# Patient Record
Sex: FEMALE | Race: WHITE | NOT HISPANIC OR LATINO | Employment: OTHER | URBAN - METROPOLITAN AREA
[De-identification: names, ages, dates, MRNs, and addresses within clinical notes are randomized per-mention and may not be internally consistent; named-entity substitution may affect disease eponyms.]

---

## 2017-02-06 ENCOUNTER — HOSPITAL ENCOUNTER (INPATIENT)
Facility: MEDICAL CENTER | Age: 72
LOS: 1 days | DRG: 280 | End: 2017-02-07
Attending: EMERGENCY MEDICINE | Admitting: INTERNAL MEDICINE
Payer: MEDICARE

## 2017-02-06 ENCOUNTER — RESOLUTE PROFESSIONAL BILLING HOSPITAL PROF FEE (OUTPATIENT)
Dept: HOSPITALIST | Facility: MEDICAL CENTER | Age: 72
End: 2017-02-06
Payer: MEDICARE

## 2017-02-06 ENCOUNTER — APPOINTMENT (OUTPATIENT)
Dept: RADIOLOGY | Facility: MEDICAL CENTER | Age: 72
DRG: 280 | End: 2017-02-06
Attending: INTERNAL MEDICINE
Payer: MEDICARE

## 2017-02-06 ENCOUNTER — APPOINTMENT (OUTPATIENT)
Dept: RADIOLOGY | Facility: MEDICAL CENTER | Age: 72
DRG: 280 | End: 2017-02-06
Attending: EMERGENCY MEDICINE
Payer: MEDICARE

## 2017-02-06 DIAGNOSIS — J44.1 COPD WITH ACUTE EXACERBATION (HCC): ICD-10-CM

## 2017-02-06 PROBLEM — R79.89 ELEVATED TROPONIN: Status: ACTIVE | Noted: 2017-02-06

## 2017-02-06 PROBLEM — E78.5 HLD (HYPERLIPIDEMIA): Status: ACTIVE | Noted: 2017-02-06

## 2017-02-06 PROBLEM — E83.52 HYPERCALCEMIA: Status: ACTIVE | Noted: 2017-02-06

## 2017-02-06 PROBLEM — I25.10 CAD (CORONARY ARTERY DISEASE): Status: ACTIVE | Noted: 2017-02-06

## 2017-02-06 PROBLEM — J96.01 ACUTE RESPIRATORY FAILURE WITH HYPOXIA (HCC): Status: ACTIVE | Noted: 2017-02-06

## 2017-02-06 PROBLEM — E11.9 DM (DIABETES MELLITUS) (HCC): Status: ACTIVE | Noted: 2017-02-06

## 2017-02-06 LAB
ALBUMIN SERPL BCP-MCNC: 3.8 G/DL (ref 3.2–4.9)
ALBUMIN/GLOB SERPL: 1.1 G/DL
ALP SERPL-CCNC: 109 U/L (ref 30–99)
ALT SERPL-CCNC: 16 U/L (ref 2–50)
ANION GAP SERPL CALC-SCNC: 9 MMOL/L (ref 0–11.9)
APTT PPP: 29.2 SEC (ref 24.7–36)
AST SERPL-CCNC: 20 U/L (ref 12–45)
BASOPHILS # BLD AUTO: 0.3 % (ref 0–1.8)
BASOPHILS # BLD: 0.02 K/UL (ref 0–0.12)
BILIRUB SERPL-MCNC: 1.2 MG/DL (ref 0.1–1.5)
BNP SERPL-MCNC: 99 PG/ML (ref 0–100)
BUN SERPL-MCNC: 16 MG/DL (ref 8–22)
CA-I SERPL-SCNC: 1.18 MMOL/L (ref 1.1–1.3)
CALCIUM SERPL-MCNC: 10.4 MG/DL (ref 8.4–10.2)
CHLORIDE SERPL-SCNC: 101 MMOL/L (ref 96–112)
CO2 SERPL-SCNC: 25 MMOL/L (ref 20–33)
CREAT SERPL-MCNC: 0.72 MG/DL (ref 0.5–1.4)
EOSINOPHIL # BLD AUTO: 0.04 K/UL (ref 0–0.51)
EOSINOPHIL NFR BLD: 0.6 % (ref 0–6.9)
ERYTHROCYTE [DISTWIDTH] IN BLOOD BY AUTOMATED COUNT: 47.5 FL (ref 35.9–50)
GFR SERPL CREATININE-BSD FRML MDRD: >60 ML/MIN/1.73 M 2
GLOBULIN SER CALC-MCNC: 3.4 G/DL (ref 1.9–3.5)
GLUCOSE BLD-MCNC: 110 MG/DL (ref 65–99)
GLUCOSE BLD-MCNC: 220 MG/DL (ref 65–99)
GLUCOSE SERPL-MCNC: 111 MG/DL (ref 65–99)
HCT VFR BLD AUTO: 48.6 % (ref 37–47)
HGB BLD-MCNC: 15.8 G/DL (ref 12–16)
IMM GRANULOCYTES # BLD AUTO: 0.03 K/UL (ref 0–0.11)
IMM GRANULOCYTES NFR BLD AUTO: 0.5 % (ref 0–0.9)
INR PPP: 1.03 (ref 0.87–1.13)
LYMPHOCYTES # BLD AUTO: 0.97 K/UL (ref 1–4.8)
LYMPHOCYTES NFR BLD: 15.1 % (ref 22–41)
MCH RBC QN AUTO: 27.6 PG (ref 27–33)
MCHC RBC AUTO-ENTMCNC: 32.5 G/DL (ref 33.6–35)
MCV RBC AUTO: 85 FL (ref 81.4–97.8)
MONOCYTES # BLD AUTO: 0.55 K/UL (ref 0–0.85)
MONOCYTES NFR BLD AUTO: 8.6 % (ref 0–13.4)
NEUTROPHILS # BLD AUTO: 4.81 K/UL (ref 2–7.15)
NEUTROPHILS NFR BLD: 74.9 % (ref 44–72)
NRBC # BLD AUTO: 0 K/UL
NRBC BLD AUTO-RTO: 0 /100 WBC
PLATELET # BLD AUTO: 230 K/UL (ref 164–446)
PMV BLD AUTO: 10 FL (ref 9–12.9)
POTASSIUM SERPL-SCNC: 3.6 MMOL/L (ref 3.6–5.5)
PROT SERPL-MCNC: 7.2 G/DL (ref 6–8.2)
PROTHROMBIN TIME: 13.3 SEC (ref 12–14.6)
RBC # BLD AUTO: 5.72 M/UL (ref 4.2–5.4)
SODIUM SERPL-SCNC: 135 MMOL/L (ref 135–145)
TROPONIN I SERPL-MCNC: 0.02 NG/ML (ref 0–0.04)
TROPONIN I SERPL-MCNC: 0.05 NG/ML (ref 0–0.04)
WBC # BLD AUTO: 6.4 K/UL (ref 4.8–10.8)

## 2017-02-06 PROCEDURE — 700101 HCHG RX REV CODE 250: Performed by: INTERNAL MEDICINE

## 2017-02-06 PROCEDURE — 90471 IMMUNIZATION ADMIN: CPT

## 2017-02-06 PROCEDURE — 90662 IIV NO PRSV INCREASED AG IM: CPT | Performed by: INTERNAL MEDICINE

## 2017-02-06 PROCEDURE — 94760 N-INVAS EAR/PLS OXIMETRY 1: CPT

## 2017-02-06 PROCEDURE — 93306 TTE W/DOPPLER COMPLETE: CPT | Mod: 26 | Performed by: INTERNAL MEDICINE

## 2017-02-06 PROCEDURE — A9270 NON-COVERED ITEM OR SERVICE: HCPCS | Performed by: INTERNAL MEDICINE

## 2017-02-06 PROCEDURE — 83880 ASSAY OF NATRIURETIC PEPTIDE: CPT

## 2017-02-06 PROCEDURE — 36415 COLL VENOUS BLD VENIPUNCTURE: CPT

## 2017-02-06 PROCEDURE — 700111 HCHG RX REV CODE 636 W/ 250 OVERRIDE (IP): Performed by: EMERGENCY MEDICINE

## 2017-02-06 PROCEDURE — 80053 COMPREHEN METABOLIC PANEL: CPT

## 2017-02-06 PROCEDURE — 85610 PROTHROMBIN TIME: CPT

## 2017-02-06 PROCEDURE — 71010 DX-CHEST-PORTABLE (1 VIEW): CPT

## 2017-02-06 PROCEDURE — 304562 HCHG STAT O2 MASK/CANNULA

## 2017-02-06 PROCEDURE — 700102 HCHG RX REV CODE 250 W/ 637 OVERRIDE(OP): Performed by: INTERNAL MEDICINE

## 2017-02-06 PROCEDURE — 99285 EMERGENCY DEPT VISIT HI MDM: CPT

## 2017-02-06 PROCEDURE — 85025 COMPLETE CBC W/AUTO DIFF WBC: CPT

## 2017-02-06 PROCEDURE — 82962 GLUCOSE BLOOD TEST: CPT | Mod: 91

## 2017-02-06 PROCEDURE — 84484 ASSAY OF TROPONIN QUANT: CPT

## 2017-02-06 PROCEDURE — 87040 BLOOD CULTURE FOR BACTERIA: CPT

## 2017-02-06 PROCEDURE — 700111 HCHG RX REV CODE 636 W/ 250 OVERRIDE (IP): Performed by: INTERNAL MEDICINE

## 2017-02-06 PROCEDURE — 85730 THROMBOPLASTIN TIME PARTIAL: CPT

## 2017-02-06 PROCEDURE — 770020 HCHG ROOM/CARE - TELE (206)

## 2017-02-06 PROCEDURE — 700101 HCHG RX REV CODE 250: Performed by: EMERGENCY MEDICINE

## 2017-02-06 PROCEDURE — 96374 THER/PROPH/DIAG INJ IV PUSH: CPT

## 2017-02-06 PROCEDURE — 94640 AIRWAY INHALATION TREATMENT: CPT

## 2017-02-06 PROCEDURE — 3E0234Z INTRODUCTION OF SERUM, TOXOID AND VACCINE INTO MUSCLE, PERCUTANEOUS APPROACH: ICD-10-PCS | Performed by: INTERNAL MEDICINE

## 2017-02-06 PROCEDURE — 71250 CT THORAX DX C-: CPT

## 2017-02-06 PROCEDURE — 82330 ASSAY OF CALCIUM: CPT

## 2017-02-06 PROCEDURE — 99223 1ST HOSP IP/OBS HIGH 75: CPT | Performed by: INTERNAL MEDICINE

## 2017-02-06 PROCEDURE — 304561 HCHG STAT O2

## 2017-02-06 PROCEDURE — 93306 TTE W/DOPPLER COMPLETE: CPT

## 2017-02-06 RX ORDER — ALBUTEROL SULFATE 90 UG/1
1 AEROSOL, METERED RESPIRATORY (INHALATION) EVERY 6 HOURS PRN
COMMUNITY

## 2017-02-06 RX ORDER — ENEMA 19; 7 G/133ML; G/133ML
1 ENEMA RECTAL
Status: DISCONTINUED | OUTPATIENT
Start: 2017-02-06 | End: 2017-02-07 | Stop reason: HOSPADM

## 2017-02-06 RX ORDER — PREDNISONE 20 MG/1
40 TABLET ORAL
Status: DISCONTINUED | OUTPATIENT
Start: 2017-02-06 | End: 2017-02-06

## 2017-02-06 RX ORDER — BACLOFEN 10 MG/1
10 TABLET ORAL DAILY
Status: DISCONTINUED | OUTPATIENT
Start: 2017-02-06 | End: 2017-02-07 | Stop reason: HOSPADM

## 2017-02-06 RX ORDER — BISACODYL 10 MG
10 SUPPOSITORY, RECTAL RECTAL
Status: DISCONTINUED | OUTPATIENT
Start: 2017-02-06 | End: 2017-02-07 | Stop reason: HOSPADM

## 2017-02-06 RX ORDER — ONDANSETRON 2 MG/ML
4 INJECTION INTRAMUSCULAR; INTRAVENOUS EVERY 4 HOURS PRN
Status: DISCONTINUED | OUTPATIENT
Start: 2017-02-06 | End: 2017-02-07 | Stop reason: HOSPADM

## 2017-02-06 RX ORDER — ONDANSETRON 4 MG/1
4 TABLET, ORALLY DISINTEGRATING ORAL EVERY 4 HOURS PRN
Status: DISCONTINUED | OUTPATIENT
Start: 2017-02-06 | End: 2017-02-07 | Stop reason: HOSPADM

## 2017-02-06 RX ORDER — ATORVASTATIN CALCIUM 40 MG/1
40 TABLET, FILM COATED ORAL NIGHTLY
Status: DISCONTINUED | OUTPATIENT
Start: 2017-02-06 | End: 2017-02-07 | Stop reason: HOSPADM

## 2017-02-06 RX ORDER — HYDROCHLOROTHIAZIDE 12.5 MG/1
12.5 CAPSULE, GELATIN COATED ORAL
Status: DISCONTINUED | OUTPATIENT
Start: 2017-02-06 | End: 2017-02-07 | Stop reason: HOSPADM

## 2017-02-06 RX ORDER — SODIUM CHLORIDE 9 MG/ML
INJECTION, SOLUTION INTRAVENOUS CONTINUOUS
Status: DISCONTINUED | OUTPATIENT
Start: 2017-02-06 | End: 2017-02-06

## 2017-02-06 RX ORDER — METOPROLOL SUCCINATE 100 MG/1
100 TABLET, EXTENDED RELEASE ORAL DAILY
COMMUNITY

## 2017-02-06 RX ORDER — AMOXICILLIN 250 MG
1 CAPSULE ORAL
Status: DISCONTINUED | OUTPATIENT
Start: 2017-02-06 | End: 2017-02-07 | Stop reason: HOSPADM

## 2017-02-06 RX ORDER — LOSARTAN POTASSIUM 25 MG/1
50 TABLET ORAL
Status: DISCONTINUED | OUTPATIENT
Start: 2017-02-06 | End: 2017-02-07 | Stop reason: HOSPADM

## 2017-02-06 RX ORDER — LOSARTAN POTASSIUM AND HYDROCHLOROTHIAZIDE 12.5; 5 MG/1; MG/1
1 TABLET ORAL DAILY
Status: DISCONTINUED | OUTPATIENT
Start: 2017-02-06 | End: 2017-02-06

## 2017-02-06 RX ORDER — AZITHROMYCIN 250 MG/1
500 TABLET, FILM COATED ORAL ONCE
Status: COMPLETED | OUTPATIENT
Start: 2017-02-06 | End: 2017-02-06

## 2017-02-06 RX ORDER — IPRATROPIUM BROMIDE AND ALBUTEROL SULFATE 2.5; .5 MG/3ML; MG/3ML
3 SOLUTION RESPIRATORY (INHALATION)
Status: DISCONTINUED | OUTPATIENT
Start: 2017-02-06 | End: 2017-02-07 | Stop reason: HOSPADM

## 2017-02-06 RX ORDER — AMLODIPINE BESYLATE 5 MG/1
5 TABLET ORAL 2 TIMES DAILY
COMMUNITY
End: 2019-11-12

## 2017-02-06 RX ORDER — GUAIFENESIN/DEXTROMETHORPHAN 100-10MG/5
10 SYRUP ORAL EVERY 6 HOURS PRN
Status: DISCONTINUED | OUTPATIENT
Start: 2017-02-06 | End: 2017-02-07 | Stop reason: HOSPADM

## 2017-02-06 RX ORDER — LACTULOSE 20 G/30ML
30 SOLUTION ORAL
Status: DISCONTINUED | OUTPATIENT
Start: 2017-02-06 | End: 2017-02-07 | Stop reason: HOSPADM

## 2017-02-06 RX ORDER — METHYLPREDNISOLONE SODIUM SUCCINATE 125 MG/2ML
62.5 INJECTION, POWDER, LYOPHILIZED, FOR SOLUTION INTRAMUSCULAR; INTRAVENOUS EVERY 6 HOURS
Status: DISCONTINUED | OUTPATIENT
Start: 2017-02-06 | End: 2017-02-07 | Stop reason: HOSPADM

## 2017-02-06 RX ORDER — METOPROLOL SUCCINATE 25 MG/1
100 TABLET, EXTENDED RELEASE ORAL DAILY
Status: DISCONTINUED | OUTPATIENT
Start: 2017-02-06 | End: 2017-02-07 | Stop reason: HOSPADM

## 2017-02-06 RX ORDER — ATORVASTATIN CALCIUM 40 MG/1
40 TABLET, FILM COATED ORAL NIGHTLY
COMMUNITY
End: 2020-01-10

## 2017-02-06 RX ORDER — ACETAMINOPHEN 325 MG/1
650 TABLET ORAL EVERY 6 HOURS PRN
Status: DISCONTINUED | OUTPATIENT
Start: 2017-02-06 | End: 2017-02-07 | Stop reason: HOSPADM

## 2017-02-06 RX ORDER — LOSARTAN POTASSIUM AND HYDROCHLOROTHIAZIDE 12.5; 5 MG/1; MG/1
1 TABLET ORAL DAILY
COMMUNITY
End: 2019-11-12

## 2017-02-06 RX ORDER — METHYLPREDNISOLONE SODIUM SUCCINATE 125 MG/2ML
125 INJECTION, POWDER, LYOPHILIZED, FOR SOLUTION INTRAMUSCULAR; INTRAVENOUS ONCE
Status: COMPLETED | OUTPATIENT
Start: 2017-02-06 | End: 2017-02-06

## 2017-02-06 RX ORDER — DOCUSATE SODIUM 100 MG/1
100 CAPSULE, LIQUID FILLED ORAL EVERY MORNING
Status: DISCONTINUED | OUTPATIENT
Start: 2017-02-06 | End: 2017-02-07 | Stop reason: HOSPADM

## 2017-02-06 RX ORDER — AMOXICILLIN 250 MG
1 CAPSULE ORAL NIGHTLY
Status: DISCONTINUED | OUTPATIENT
Start: 2017-02-06 | End: 2017-02-07 | Stop reason: HOSPADM

## 2017-02-06 RX ORDER — DEXTROSE MONOHYDRATE 25 G/50ML
25 INJECTION, SOLUTION INTRAVENOUS
Status: DISCONTINUED | OUTPATIENT
Start: 2017-02-06 | End: 2017-02-07 | Stop reason: HOSPADM

## 2017-02-06 RX ORDER — AZITHROMYCIN 250 MG/1
250 TABLET, FILM COATED ORAL DAILY
Status: DISCONTINUED | OUTPATIENT
Start: 2017-02-07 | End: 2017-02-07 | Stop reason: HOSPADM

## 2017-02-06 RX ORDER — AMLODIPINE BESYLATE 5 MG/1
5 TABLET ORAL 2 TIMES DAILY
Status: DISCONTINUED | OUTPATIENT
Start: 2017-02-06 | End: 2017-02-07 | Stop reason: HOSPADM

## 2017-02-06 RX ORDER — BACLOFEN 10 MG/1
10 TABLET ORAL DAILY
COMMUNITY
End: 2019-11-12

## 2017-02-06 RX ADMIN — ATORVASTATIN CALCIUM 40 MG: 40 TABLET, FILM COATED ORAL at 20:07

## 2017-02-06 RX ADMIN — ALBUTEROL SULFATE 2.5 MG: 2.5 SOLUTION RESPIRATORY (INHALATION) at 12:55

## 2017-02-06 RX ADMIN — METHYLPREDNISOLONE SODIUM SUCCINATE 62.5 MG: 125 INJECTION, POWDER, FOR SOLUTION INTRAMUSCULAR; INTRAVENOUS at 17:10

## 2017-02-06 RX ADMIN — IPRATROPIUM BROMIDE 0.5 MG: 0.5 SOLUTION RESPIRATORY (INHALATION) at 12:55

## 2017-02-06 RX ADMIN — ENOXAPARIN SODIUM 40 MG: 100 INJECTION SUBCUTANEOUS at 17:09

## 2017-02-06 RX ADMIN — INSULIN LISPRO 2 UNITS: 100 INJECTION, SOLUTION INTRAVENOUS; SUBCUTANEOUS at 20:10

## 2017-02-06 RX ADMIN — IPRATROPIUM BROMIDE AND ALBUTEROL SULFATE 3 ML: .5; 3 SOLUTION RESPIRATORY (INHALATION) at 22:13

## 2017-02-06 RX ADMIN — IPRATROPIUM BROMIDE AND ALBUTEROL SULFATE 3 ML: .5; 3 SOLUTION RESPIRATORY (INHALATION) at 18:51

## 2017-02-06 RX ADMIN — METHYLPREDNISOLONE SODIUM SUCCINATE 125 MG: 125 INJECTION, POWDER, FOR SOLUTION INTRAMUSCULAR; INTRAVENOUS at 13:08

## 2017-02-06 RX ADMIN — AZITHROMYCIN 500 MG: 250 TABLET, FILM COATED ORAL at 17:09

## 2017-02-06 RX ADMIN — INFLUENZA A VIRUS A/CALIFORNIA/7/2009 X-179A (H1N1) ANTIGEN (FORMALDEHYDE INACTIVATED), INFLUENZA A VIRUS A/HONG KONG/4801/2014 X-263B (H3N2) ANTIGEN (FORMALDEHYDE INACTIVATED), AND INFLUENZA B VIRUS B/BRISBANE/60/2008 ANTIGEN (FORMALDEHYDE INACTIVATED) 0.5 ML: 60; 60; 60 INJECTION, SUSPENSION INTRAMUSCULAR at 18:15

## 2017-02-06 ASSESSMENT — PAIN SCALES - GENERAL
PAINLEVEL_OUTOF10: 0
PAINLEVEL_OUTOF10: 0

## 2017-02-06 ASSESSMENT — LIFESTYLE VARIABLES
EVER_SMOKED: YES
ALCOHOL_USE: NO

## 2017-02-06 ASSESSMENT — PAIN SCALES - WONG BAKER: WONGBAKER_NUMERICALRESPONSE: DOESN'T HURT AT ALL

## 2017-02-06 NOTE — ED PROVIDER NOTES
ED Provider Note    CHIEF COMPLAINT  Chief Complaint   Patient presents with   • Shortness of Breath   • Cough       HPI  Lacey Her is a 71 y.o. female who presents to the emergency department with chief complaint of cough and shortness of breath. The patient has a history of COPD. She feels her COPD has been acting up. She does not know she's had a fever. She's had a cough productive of sputum. She has had some posttussive emesis but no kadi vomiting. No diarrhea. No chest pain.    REVIEW OF SYSTEMS  See HPI for further details. All other systems are negative.     PAST MEDICAL HISTORY  Past Medical History   Diagnosis Date   • Chronic obstructive pulmonary disease (CMS-[facility])    • CAD (coronary artery disease)    • Hypertension    • Hypercholesterolemia    • Diabetes (CMS-[facility])        FAMILY HISTORY  History reviewed. No pertinent family history.    SOCIAL HISTORY  Social History     Social History   • Marital Status:      Spouse Name: N/A   • Number of Children: N/A   • Years of Education: N/A     Social History Main Topics   • Smoking status: Smoker, Current Status Unknown -- 1.00 packs/day     Types: Cigarettes   • Smokeless tobacco: None   • Alcohol Use: No   • Drug Use: No   • Sexual Activity: Not Asked     Other Topics Concern   • None     Social History Narrative   • None       SURGICAL HISTORY  Past Surgical History   Procedure Laterality Date   • Other cardiac surgery  2004     cabg x4v       CURRENT MEDICATIONS  Home Medications     Reviewed by Kamari Venegas (Pharmacy Tech) on 02/06/17 at 1340  Med List Status: Complete    Medication Last Dose Status    amlodipine (NORVASC) 5 MG Tab  Active    atorvastatin (LIPITOR) 40 MG Tab  Active    baclofen (LIORESAL) 10 MG Tab  Active    losartan-hydrochlorothiazide (HYZAAR) 50-12.5 MG per tablet  Active    metformin (GLUCOPHAGE) 500 MG Tab  Active    metoprolol SR (TOPROL XL) 100 MG TABLET SR 24 HR  Active                ALLERGIES  No Known  "Allergies    PHYSICAL EXAM  VITAL SIGNS: /81 mmHg  Pulse 71  Temp(Src) 36.5 °C (97.7 °F)  Resp 22  Ht 1.626 m (5' 4.02\")  Wt 81.647 kg (180 lb)  BMI 30.88 kg/m2  SpO2 96%  Constitutional: Well developed, Well nourished, No acute distress, Non-toxic appearance.   HENT: Normocephalic, Atraumatic, Bilateral external ears normal, Oropharynx moist, No oral exudates, Nose normal.   Eyes: PERRLA, EOMI, Conjunctiva normal, No discharge.   Neck: Normal range of motion, No tenderness, Supple, No stridor.   Cardiovascular: Regular rate and rhythm, no audible murmur   Thorax & Lungs: Diminished breath sounds throughout, and expiratory wheezing noted, tachypnea noted. Increased work of breathing noted.  Abdomen: Bowel sounds normal, Soft, No tenderness, No masses, No pulsatile masses.   Skin: Warm, Dry, No erythema, No rash.   Back: No tenderness, No CVA tenderness.   Extremities: Intact distal pulses, No tenderness, No cyanosis, No clubbing. No edema.  Neurologic: Alert & oriented x 3, Normal motor function, Normal sensory function, No focal deficits noted.       EKG interpretation  Twelve-lead EKG is obtained at 1225, interpreted by me, normal sinus rhythm at 61, normal axis, nonspecific intraventricular conduction delay noted, ST segment depression and T-wave inversion in V4, V5, V6, T-wave flattening in aVL, no diagnostic ST segment elevation. Abnormal EKG.      Results for orders placed or performed during the hospital encounter of 02/06/17   CBC WITH DIFFERENTIAL   Result Value Ref Range    WBC 6.4 4.8 - 10.8 K/uL    RBC 5.72 (H) 4.20 - 5.40 M/uL    Hemoglobin 15.8 12.0 - 16.0 g/dL    Hematocrit 48.6 (H) 37.0 - 47.0 %    MCV 85.0 81.4 - 97.8 fL    MCH 27.6 27.0 - 33.0 pg    MCHC 32.5 (L) 33.6 - 35.0 g/dL    RDW 47.5 35.9 - 50.0 fL    Platelet Count 230 164 - 446 K/uL    MPV 10.0 9.0 - 12.9 fL    Neutrophils-Polys 74.90 (H) 44.00 - 72.00 %    Lymphocytes 15.10 (L) 22.00 - 41.00 %    Monocytes 8.60 0.00 - 13.40 " %    Eosinophils 0.60 0.00 - 6.90 %    Basophils 0.30 0.00 - 1.80 %    Immature Granulocytes 0.50 0.00 - 0.90 %    Nucleated RBC 0.00 /100 WBC    Neutrophils (Absolute) 4.81 2.00 - 7.15 K/uL    Lymphs (Absolute) 0.97 (L) 1.00 - 4.80 K/uL    Monos (Absolute) 0.55 0.00 - 0.85 K/uL    Eos (Absolute) 0.04 0.00 - 0.51 K/uL    Baso (Absolute) 0.02 0.00 - 0.12 K/uL    Immature Granulocytes (abs) 0.03 0.00 - 0.11 K/uL    NRBC (Absolute) 0.00 K/uL   COMP METABOLIC PANEL   Result Value Ref Range    Sodium 135 135 - 145 mmol/L    Potassium 3.6 3.6 - 5.5 mmol/L    Chloride 101 96 - 112 mmol/L    Co2 25 20 - 33 mmol/L    Anion Gap 9.0 0.0 - 11.9    Glucose 111 (H) 65 - 99 mg/dL    Bun 16 8 - 22 mg/dL    Creatinine 0.72 0.50 - 1.40 mg/dL    Calcium 10.4 (H) 8.4 - 10.2 mg/dL    AST(SGOT) 20 12 - 45 U/L    ALT(SGPT) 16 2 - 50 U/L    Alkaline Phosphatase 109 (H) 30 - 99 U/L    Total Bilirubin 1.2 0.1 - 1.5 mg/dL    Albumin 3.8 3.2 - 4.9 g/dL    Total Protein 7.2 6.0 - 8.2 g/dL    Globulin 3.4 1.9 - 3.5 g/dL    A-G Ratio 1.1 g/dL   TROPONIN   Result Value Ref Range    Troponin I 0.05 (H) 0.00 - 0.04 ng/mL   BTYPE NATRIURETIC PEPTIDE   Result Value Ref Range    B Natriuretic Peptide 99 0 - 100 pg/mL   PROTHROMBIN TIME (INR)   Result Value Ref Range    PT 13.3 12.0 - 14.6 sec    INR 1.03 0.87 - 1.13   APTT   Result Value Ref Range    APTT 29.2 24.7 - 36.0 sec   ESTIMATED GFR   Result Value Ref Range    GFR If African American >60 >60 mL/min/1.73 m 2    GFR If Non African American >60 >60 mL/min/1.73 m 2         RADIOLOGY/PROCEDURES  DX-CHEST-PORTABLE (1 VIEW)    (Results Pending)         COURSE & MEDICAL DECISION MAKING  Pertinent Labs & Imaging studies reviewed. (See chart for details)    The patient presents today with cough and shortness of breath. She has a history of COPD. She feels very short of breath. The patient is treated in the emergency department with a nebulizer treatment and IV steroids.    The patient requires inpatient  treatment. She is tachypneic and has increased worker breathing. I spoke with the on-call hospitalist Dr. Pérez for admission    FINAL IMPRESSION  1. COPD with acute exacerbation (CMS-[facility])              Electronically signed by: Jonatan Hernandez, 2/6/2017 1:46 PM

## 2017-02-06 NOTE — ED NOTES
Med rec complete per [employer] [phone]  Allergies reviewed  Per patient she does know any of her medications, called insurance for an updates list

## 2017-02-06 NOTE — FLOWSHEET NOTE
02/06/17 1256   Interdisciplinary Plan of Care-Outcomes    Bronchodilator Outcome Diminished Wheezing and Volume of Air Movement Increased   Education   Education Yes - Pt. / Family has been Instructed in use of Respiratory Medications and Adverse Reactions   RT Assessment of Delivered Medications   Evaluation of Medication Delivery Daily Yes-- Pt /Family has been Instructed in use of Respiratory Medications and Adverse Reactions   SVN Group   #SVN Performed Yes   Given By: Mouthpiece   Date SVN Last Changed 02/06/17   Date SVN Next Change Due (Q 7 Days) 02/13/17   Respiratory WDL   Respiratory (WDL) X   Chest Exam   Work Of Breathing / Effort Accessory Muscle Use   Respiration (!) 22   Pulse 71   Heart Rate (Monitored) 77   Breath Sounds   Pre/Post Intervention Pre Intervention Assessment   RUL Breath Sounds Expiratory Wheezes;Diminished   RML Breath Sounds Diminished   RLL Breath Sounds Diminished   YMUIKO Breath Sounds Expiratory Wheezes;Diminished   LLL Breath Sounds Diminished   Oxygen   Pulse Oximetry 96 %

## 2017-02-07 ENCOUNTER — APPOINTMENT (OUTPATIENT)
Dept: RADIOLOGY | Facility: MEDICAL CENTER | Age: 72
DRG: 280 | End: 2017-02-07
Attending: INTERNAL MEDICINE
Payer: MEDICARE

## 2017-02-07 ENCOUNTER — HOSPITAL ENCOUNTER (INPATIENT)
Facility: MEDICAL CENTER | Age: 72
LOS: 2 days | DRG: 280 | End: 2017-02-09
Attending: HOSPITALIST | Admitting: HOSPITALIST
Payer: MEDICARE

## 2017-02-07 VITALS
DIASTOLIC BLOOD PRESSURE: 77 MMHG | TEMPERATURE: 97.7 F | BODY MASS INDEX: 29.73 KG/M2 | HEIGHT: 64 IN | HEART RATE: 99 BPM | OXYGEN SATURATION: 91 % | RESPIRATION RATE: 18 BRPM | WEIGHT: 174.16 LBS | SYSTOLIC BLOOD PRESSURE: 99 MMHG

## 2017-02-07 DIAGNOSIS — J44.1 COPD WITH ACUTE EXACERBATION (HCC): ICD-10-CM

## 2017-02-07 DIAGNOSIS — I21.4 NSTEMI (NON-ST ELEVATED MYOCARDIAL INFARCTION) (HCC): ICD-10-CM

## 2017-02-07 DIAGNOSIS — J40 BRONCHITIS: ICD-10-CM

## 2017-02-07 DIAGNOSIS — I25.729 CORONARY ARTERY DISEASE INVOLVING AUTOLOGOUS ARTERY CORONARY BYPASS GRAFT WITH ANGINA PECTORIS (HCC): ICD-10-CM

## 2017-02-07 DIAGNOSIS — J96.01 ACUTE RESPIRATORY FAILURE WITH HYPOXIA (HCC): ICD-10-CM

## 2017-02-07 LAB
ALBUMIN SERPL BCP-MCNC: 3.8 G/DL (ref 3.2–4.9)
ALBUMIN/GLOB SERPL: 1.2 G/DL
ALP SERPL-CCNC: 93 U/L (ref 30–99)
ALT SERPL-CCNC: 15 U/L (ref 2–50)
ANION GAP SERPL CALC-SCNC: 8 MMOL/L (ref 0–11.9)
APTT PPP: 25.3 SEC (ref 24.7–36)
AST SERPL-CCNC: 19 U/L (ref 12–45)
BASOPHILS # BLD AUTO: 0.1 % (ref 0–1.8)
BASOPHILS # BLD AUTO: 0.3 % (ref 0–1.8)
BASOPHILS # BLD: 0.01 K/UL (ref 0–0.12)
BASOPHILS # BLD: 0.04 K/UL (ref 0–0.12)
BILIRUB SERPL-MCNC: 0.9 MG/DL (ref 0.1–1.5)
BUN SERPL-MCNC: 20 MG/DL (ref 8–22)
CALCIUM SERPL-MCNC: 10.5 MG/DL (ref 8.4–10.2)
CHLORIDE SERPL-SCNC: 104 MMOL/L (ref 96–112)
CHOLEST SERPL-MCNC: 108 MG/DL (ref 100–199)
CO2 SERPL-SCNC: 24 MMOL/L (ref 20–33)
CREAT SERPL-MCNC: 0.69 MG/DL (ref 0.5–1.4)
EOSINOPHIL # BLD AUTO: 0 K/UL (ref 0–0.51)
EOSINOPHIL # BLD AUTO: 0 K/UL (ref 0–0.51)
EOSINOPHIL NFR BLD: 0 % (ref 0–6.9)
EOSINOPHIL NFR BLD: 0 % (ref 0–6.9)
ERYTHROCYTE [DISTWIDTH] IN BLOOD BY AUTOMATED COUNT: 47.5 FL (ref 35.9–50)
ERYTHROCYTE [DISTWIDTH] IN BLOOD BY AUTOMATED COUNT: 47.9 FL (ref 35.9–50)
GFR SERPL CREATININE-BSD FRML MDRD: >60 ML/MIN/1.73 M 2
GLOBULIN SER CALC-MCNC: 3.2 G/DL (ref 1.9–3.5)
GLUCOSE BLD-MCNC: 131 MG/DL (ref 65–99)
GLUCOSE BLD-MCNC: 138 MG/DL (ref 65–99)
GLUCOSE BLD-MCNC: 140 MG/DL (ref 65–99)
GLUCOSE BLD-MCNC: 150 MG/DL (ref 65–99)
GLUCOSE SERPL-MCNC: 140 MG/DL (ref 65–99)
HCT VFR BLD AUTO: 44.3 % (ref 37–47)
HCT VFR BLD AUTO: 47 % (ref 37–47)
HDLC SERPL-MCNC: 35 MG/DL
HGB BLD-MCNC: 14.6 G/DL (ref 12–16)
HGB BLD-MCNC: 15.6 G/DL (ref 12–16)
IMM GRANULOCYTES # BLD AUTO: 0.04 K/UL (ref 0–0.11)
IMM GRANULOCYTES # BLD AUTO: 0.1 K/UL (ref 0–0.11)
IMM GRANULOCYTES NFR BLD AUTO: 0.6 % (ref 0–0.9)
IMM GRANULOCYTES NFR BLD AUTO: 0.7 % (ref 0–0.9)
LDLC SERPL CALC-MCNC: 60 MG/DL
LYMPHOCYTES # BLD AUTO: 0.79 K/UL (ref 1–4.8)
LYMPHOCYTES # BLD AUTO: 1.44 K/UL (ref 1–4.8)
LYMPHOCYTES NFR BLD: 11.5 % (ref 22–41)
LYMPHOCYTES NFR BLD: 9.9 % (ref 22–41)
MAGNESIUM SERPL-MCNC: 1.9 MG/DL (ref 1.5–2.5)
MCH RBC QN AUTO: 27.9 PG (ref 27–33)
MCH RBC QN AUTO: 28.1 PG (ref 27–33)
MCHC RBC AUTO-ENTMCNC: 33 G/DL (ref 33.6–35)
MCHC RBC AUTO-ENTMCNC: 33.2 G/DL (ref 33.6–35)
MCV RBC AUTO: 84.1 FL (ref 81.4–97.8)
MCV RBC AUTO: 85.4 FL (ref 81.4–97.8)
MONOCYTES # BLD AUTO: 0.11 K/UL (ref 0–0.85)
MONOCYTES # BLD AUTO: 0.84 K/UL (ref 0–0.85)
MONOCYTES NFR BLD AUTO: 1.6 % (ref 0–13.4)
MONOCYTES NFR BLD AUTO: 5.8 % (ref 0–13.4)
NEUTROPHILS # BLD AUTO: 12.13 K/UL (ref 2–7.15)
NEUTROPHILS # BLD AUTO: 5.92 K/UL (ref 2–7.15)
NEUTROPHILS NFR BLD: 83.3 % (ref 44–72)
NEUTROPHILS NFR BLD: 86.2 % (ref 44–72)
NRBC # BLD AUTO: 0 K/UL
NRBC # BLD AUTO: 0 K/UL
NRBC BLD AUTO-RTO: 0 /100 WBC
NRBC BLD AUTO-RTO: 0 /100 WBC
PHOSPHATE SERPL-MCNC: 2.7 MG/DL (ref 2.5–4.5)
PLATELET # BLD AUTO: 218 K/UL (ref 164–446)
PLATELET # BLD AUTO: 271 K/UL (ref 164–446)
PMV BLD AUTO: 9.9 FL (ref 9–12.9)
PMV BLD AUTO: 9.9 FL (ref 9–12.9)
POTASSIUM SERPL-SCNC: 3.3 MMOL/L (ref 3.6–5.5)
PROT SERPL-MCNC: 7 G/DL (ref 6–8.2)
RBC # BLD AUTO: 5.19 M/UL (ref 4.2–5.4)
RBC # BLD AUTO: 5.59 M/UL (ref 4.2–5.4)
SODIUM SERPL-SCNC: 136 MMOL/L (ref 135–145)
TRIGL SERPL-MCNC: 64 MG/DL (ref 0–149)
TROPONIN I SERPL-MCNC: 0.03 NG/ML (ref 0–0.04)
WBC # BLD AUTO: 14.6 K/UL (ref 4.8–10.8)
WBC # BLD AUTO: 6.9 K/UL (ref 4.8–10.8)

## 2017-02-07 PROCEDURE — 94668 MNPJ CHEST WALL SBSQ: CPT

## 2017-02-07 PROCEDURE — 85025 COMPLETE CBC W/AUTO DIFF WBC: CPT | Mod: 91

## 2017-02-07 PROCEDURE — 80053 COMPREHEN METABOLIC PANEL: CPT | Mod: 91

## 2017-02-07 PROCEDURE — 82962 GLUCOSE BLOOD TEST: CPT

## 2017-02-07 PROCEDURE — 85025 COMPLETE CBC W/AUTO DIFF WBC: CPT

## 2017-02-07 PROCEDURE — 84484 ASSAY OF TROPONIN QUANT: CPT

## 2017-02-07 PROCEDURE — 700102 HCHG RX REV CODE 250 W/ 637 OVERRIDE(OP): Performed by: HOSPITALIST

## 2017-02-07 PROCEDURE — 85730 THROMBOPLASTIN TIME PARTIAL: CPT | Mod: 91

## 2017-02-07 PROCEDURE — 700101 HCHG RX REV CODE 250: Performed by: INTERNAL MEDICINE

## 2017-02-07 PROCEDURE — 700111 HCHG RX REV CODE 636 W/ 250 OVERRIDE (IP): Performed by: HOSPITALIST

## 2017-02-07 PROCEDURE — 99406 BEHAV CHNG SMOKING 3-10 MIN: CPT

## 2017-02-07 PROCEDURE — 770020 HCHG ROOM/CARE - TELE (206)

## 2017-02-07 PROCEDURE — 82962 GLUCOSE BLOOD TEST: CPT | Mod: 91

## 2017-02-07 PROCEDURE — A9502 TC99M TETROFOSMIN: HCPCS

## 2017-02-07 PROCEDURE — 99223 1ST HOSP IP/OBS HIGH 75: CPT | Performed by: HOSPITALIST

## 2017-02-07 PROCEDURE — 700111 HCHG RX REV CODE 636 W/ 250 OVERRIDE (IP): Performed by: INTERNAL MEDICINE

## 2017-02-07 PROCEDURE — 80053 COMPREHEN METABOLIC PANEL: CPT

## 2017-02-07 PROCEDURE — 84100 ASSAY OF PHOSPHORUS: CPT

## 2017-02-07 PROCEDURE — A9270 NON-COVERED ITEM OR SERVICE: HCPCS | Performed by: HOSPITALIST

## 2017-02-07 PROCEDURE — 36415 COLL VENOUS BLD VENIPUNCTURE: CPT

## 2017-02-07 PROCEDURE — 94640 AIRWAY INHALATION TREATMENT: CPT

## 2017-02-07 PROCEDURE — 80061 LIPID PANEL: CPT

## 2017-02-07 PROCEDURE — 85730 THROMBOPLASTIN TIME PARTIAL: CPT

## 2017-02-07 PROCEDURE — 700111 HCHG RX REV CODE 636 W/ 250 OVERRIDE (IP)

## 2017-02-07 PROCEDURE — 83735 ASSAY OF MAGNESIUM: CPT

## 2017-02-07 PROCEDURE — 94667 MNPJ CHEST WALL 1ST: CPT

## 2017-02-07 PROCEDURE — 94760 N-INVAS EAR/PLS OXIMETRY 1: CPT

## 2017-02-07 RX ORDER — BACLOFEN 10 MG/1
10 TABLET ORAL DAILY
Status: CANCELLED | OUTPATIENT
Start: 2017-02-08

## 2017-02-07 RX ORDER — METHYLPREDNISOLONE SODIUM SUCCINATE 125 MG/2ML
62.5 INJECTION, POWDER, LYOPHILIZED, FOR SOLUTION INTRAMUSCULAR; INTRAVENOUS EVERY 6 HOURS
Status: CANCELLED | OUTPATIENT
Start: 2017-02-07

## 2017-02-07 RX ORDER — IPRATROPIUM BROMIDE AND ALBUTEROL SULFATE 2.5; .5 MG/3ML; MG/3ML
3 SOLUTION RESPIRATORY (INHALATION)
Status: CANCELLED | OUTPATIENT
Start: 2017-02-07

## 2017-02-07 RX ORDER — FLUTICASONE PROPIONATE 110 UG/1
2 AEROSOL, METERED RESPIRATORY (INHALATION)
Status: CANCELLED | OUTPATIENT
Start: 2017-02-07

## 2017-02-07 RX ORDER — AMLODIPINE BESYLATE 5 MG/1
5 TABLET ORAL 2 TIMES DAILY
Status: DISCONTINUED | OUTPATIENT
Start: 2017-02-07 | End: 2017-02-09 | Stop reason: HOSPADM

## 2017-02-07 RX ORDER — REGADENOSON 0.08 MG/ML
INJECTION, SOLUTION INTRAVENOUS
Status: COMPLETED
Start: 2017-02-07 | End: 2017-02-07

## 2017-02-07 RX ORDER — LACTULOSE 20 G/30ML
30 SOLUTION ORAL
Status: CANCELLED | OUTPATIENT
Start: 2017-02-07

## 2017-02-07 RX ORDER — BISACODYL 10 MG
10 SUPPOSITORY, RECTAL RECTAL
Status: CANCELLED | OUTPATIENT
Start: 2017-02-07

## 2017-02-07 RX ORDER — FUROSEMIDE 10 MG/ML
20 INJECTION INTRAMUSCULAR; INTRAVENOUS ONCE
Status: COMPLETED | OUTPATIENT
Start: 2017-02-07 | End: 2017-02-07

## 2017-02-07 RX ORDER — BACLOFEN 10 MG/1
10 TABLET ORAL DAILY
Status: DISCONTINUED | OUTPATIENT
Start: 2017-02-08 | End: 2017-02-09 | Stop reason: HOSPADM

## 2017-02-07 RX ORDER — AMOXICILLIN 250 MG
1 CAPSULE ORAL
Status: DISCONTINUED | OUTPATIENT
Start: 2017-02-07 | End: 2017-02-09 | Stop reason: HOSPADM

## 2017-02-07 RX ORDER — GUAIFENESIN/DEXTROMETHORPHAN 100-10MG/5
10 SYRUP ORAL EVERY 6 HOURS PRN
Status: CANCELLED | OUTPATIENT
Start: 2017-02-07

## 2017-02-07 RX ORDER — AZITHROMYCIN 250 MG/1
250 TABLET, FILM COATED ORAL DAILY
Status: CANCELLED | OUTPATIENT
Start: 2017-02-08 | End: 2017-02-11

## 2017-02-07 RX ORDER — AMLODIPINE BESYLATE 5 MG/1
5 TABLET ORAL 2 TIMES DAILY
Status: CANCELLED | OUTPATIENT
Start: 2017-02-07

## 2017-02-07 RX ORDER — DEXTROSE MONOHYDRATE 25 G/50ML
25 INJECTION, SOLUTION INTRAVENOUS
Status: DISCONTINUED | OUTPATIENT
Start: 2017-02-07 | End: 2017-02-09 | Stop reason: HOSPADM

## 2017-02-07 RX ORDER — IPRATROPIUM BROMIDE AND ALBUTEROL SULFATE 2.5; .5 MG/3ML; MG/3ML
3 SOLUTION RESPIRATORY (INHALATION)
Status: DISCONTINUED | OUTPATIENT
Start: 2017-02-07 | End: 2017-02-09 | Stop reason: HOSPADM

## 2017-02-07 RX ORDER — ZOLPIDEM TARTRATE 5 MG/1
5 TABLET ORAL NIGHTLY PRN
Status: DISCONTINUED | OUTPATIENT
Start: 2017-02-07 | End: 2017-02-09 | Stop reason: HOSPADM

## 2017-02-07 RX ORDER — FLUTICASONE PROPIONATE 110 UG/1
2 AEROSOL, METERED RESPIRATORY (INHALATION)
Status: DISCONTINUED | OUTPATIENT
Start: 2017-02-07 | End: 2017-02-07 | Stop reason: HOSPADM

## 2017-02-07 RX ORDER — AMOXICILLIN 250 MG
1 CAPSULE ORAL NIGHTLY
Status: DISCONTINUED | OUTPATIENT
Start: 2017-02-07 | End: 2017-02-09 | Stop reason: HOSPADM

## 2017-02-07 RX ORDER — AMOXICILLIN 250 MG
1 CAPSULE ORAL
Status: CANCELLED | OUTPATIENT
Start: 2017-02-07

## 2017-02-07 RX ORDER — AZITHROMYCIN 250 MG/1
250 TABLET, FILM COATED ORAL DAILY
Status: DISCONTINUED | OUTPATIENT
Start: 2017-02-07 | End: 2017-02-09 | Stop reason: HOSPADM

## 2017-02-07 RX ORDER — METOPROLOL SUCCINATE 25 MG/1
100 TABLET, EXTENDED RELEASE ORAL DAILY
Status: CANCELLED | OUTPATIENT
Start: 2017-02-08

## 2017-02-07 RX ORDER — METHYLPREDNISOLONE SODIUM SUCCINATE 125 MG/2ML
62.5 INJECTION, POWDER, LYOPHILIZED, FOR SOLUTION INTRAMUSCULAR; INTRAVENOUS EVERY 6 HOURS
Status: DISCONTINUED | OUTPATIENT
Start: 2017-02-08 | End: 2017-02-09 | Stop reason: HOSPADM

## 2017-02-07 RX ORDER — AMOXICILLIN 250 MG
1 CAPSULE ORAL NIGHTLY
Status: CANCELLED | OUTPATIENT
Start: 2017-02-07

## 2017-02-07 RX ORDER — HYDROCHLOROTHIAZIDE 25 MG/1
12.5 TABLET ORAL
Status: DISCONTINUED | OUTPATIENT
Start: 2017-02-08 | End: 2017-02-09

## 2017-02-07 RX ORDER — ONDANSETRON 2 MG/ML
4 INJECTION INTRAMUSCULAR; INTRAVENOUS EVERY 4 HOURS PRN
Status: CANCELLED | OUTPATIENT
Start: 2017-02-07

## 2017-02-07 RX ORDER — LORAZEPAM 1 MG/1
0.5 TABLET ORAL EVERY 4 HOURS PRN
Status: DISCONTINUED | OUTPATIENT
Start: 2017-02-07 | End: 2017-02-09 | Stop reason: HOSPADM

## 2017-02-07 RX ORDER — LOSARTAN POTASSIUM 50 MG/1
50 TABLET ORAL
Status: DISCONTINUED | OUTPATIENT
Start: 2017-02-08 | End: 2017-02-09 | Stop reason: HOSPADM

## 2017-02-07 RX ORDER — DOCUSATE SODIUM 100 MG/1
100 CAPSULE, LIQUID FILLED ORAL EVERY MORNING
Status: DISCONTINUED | OUTPATIENT
Start: 2017-02-08 | End: 2017-02-09 | Stop reason: HOSPADM

## 2017-02-07 RX ORDER — LOSARTAN POTASSIUM 25 MG/1
50 TABLET ORAL
Status: CANCELLED | OUTPATIENT
Start: 2017-02-08

## 2017-02-07 RX ORDER — HYDROCHLOROTHIAZIDE 12.5 MG/1
12.5 CAPSULE, GELATIN COATED ORAL
Status: CANCELLED | OUTPATIENT
Start: 2017-02-08

## 2017-02-07 RX ORDER — ALBUTEROL SULFATE 90 UG/1
2 AEROSOL, METERED RESPIRATORY (INHALATION)
Status: DISCONTINUED | OUTPATIENT
Start: 2017-02-07 | End: 2017-02-08

## 2017-02-07 RX ORDER — ONDANSETRON 2 MG/ML
4 INJECTION INTRAMUSCULAR; INTRAVENOUS EVERY 4 HOURS PRN
Status: DISCONTINUED | OUTPATIENT
Start: 2017-02-07 | End: 2017-02-09 | Stop reason: HOSPADM

## 2017-02-07 RX ORDER — BISACODYL 10 MG
10 SUPPOSITORY, RECTAL RECTAL
Status: DISCONTINUED | OUTPATIENT
Start: 2017-02-07 | End: 2017-02-09 | Stop reason: HOSPADM

## 2017-02-07 RX ORDER — METOPROLOL SUCCINATE 50 MG/1
100 TABLET, EXTENDED RELEASE ORAL DAILY
Status: DISCONTINUED | OUTPATIENT
Start: 2017-02-08 | End: 2017-02-09 | Stop reason: HOSPADM

## 2017-02-07 RX ORDER — IPRATROPIUM BROMIDE AND ALBUTEROL SULFATE 2.5; .5 MG/3ML; MG/3ML
3 SOLUTION RESPIRATORY (INHALATION)
Status: DISCONTINUED | OUTPATIENT
Start: 2017-02-07 | End: 2017-02-08

## 2017-02-07 RX ORDER — ATORVASTATIN CALCIUM 40 MG/1
40 TABLET, FILM COATED ORAL NIGHTLY
Status: CANCELLED | OUTPATIENT
Start: 2017-02-07

## 2017-02-07 RX ORDER — ENEMA 19; 7 G/133ML; G/133ML
1 ENEMA RECTAL
Status: CANCELLED | OUTPATIENT
Start: 2017-02-07

## 2017-02-07 RX ORDER — ACETAMINOPHEN 325 MG/1
650 TABLET ORAL EVERY 6 HOURS PRN
Status: DISCONTINUED | OUTPATIENT
Start: 2017-02-07 | End: 2017-02-09 | Stop reason: HOSPADM

## 2017-02-07 RX ORDER — HEPARIN SODIUM 1000 [USP'U]/ML
2600 INJECTION, SOLUTION INTRAVENOUS; SUBCUTANEOUS PRN
Status: DISCONTINUED | OUTPATIENT
Start: 2017-02-07 | End: 2017-02-08

## 2017-02-07 RX ORDER — ACETAMINOPHEN 325 MG/1
650 TABLET ORAL EVERY 6 HOURS PRN
Status: CANCELLED | OUTPATIENT
Start: 2017-02-07

## 2017-02-07 RX ORDER — ONDANSETRON 4 MG/1
4 TABLET, ORALLY DISINTEGRATING ORAL EVERY 4 HOURS PRN
Status: CANCELLED | OUTPATIENT
Start: 2017-02-07

## 2017-02-07 RX ORDER — GUAIFENESIN/DEXTROMETHORPHAN 100-10MG/5
10 SYRUP ORAL EVERY 6 HOURS PRN
Status: DISCONTINUED | OUTPATIENT
Start: 2017-02-07 | End: 2017-02-09 | Stop reason: HOSPADM

## 2017-02-07 RX ORDER — DOCUSATE SODIUM 100 MG/1
100 CAPSULE, LIQUID FILLED ORAL EVERY MORNING
Status: CANCELLED | OUTPATIENT
Start: 2017-02-08

## 2017-02-07 RX ORDER — ALBUTEROL SULFATE 90 UG/1
2 AEROSOL, METERED RESPIRATORY (INHALATION)
Status: CANCELLED | OUTPATIENT
Start: 2017-02-07

## 2017-02-07 RX ORDER — LACTULOSE 20 G/30ML
30 SOLUTION ORAL
Status: DISCONTINUED | OUTPATIENT
Start: 2017-02-07 | End: 2017-02-09 | Stop reason: HOSPADM

## 2017-02-07 RX ORDER — ENEMA 19; 7 G/133ML; G/133ML
1 ENEMA RECTAL
Status: DISCONTINUED | OUTPATIENT
Start: 2017-02-07 | End: 2017-02-09 | Stop reason: HOSPADM

## 2017-02-07 RX ORDER — HEPARIN SODIUM 1000 [USP'U]/ML
2600 INJECTION, SOLUTION INTRAVENOUS; SUBCUTANEOUS PRN
Status: DISCONTINUED | OUTPATIENT
Start: 2017-02-07 | End: 2017-02-07 | Stop reason: HOSPADM

## 2017-02-07 RX ORDER — FLUTICASONE PROPIONATE 110 UG/1
2 AEROSOL, METERED RESPIRATORY (INHALATION)
Status: DISCONTINUED | OUTPATIENT
Start: 2017-02-08 | End: 2017-02-09 | Stop reason: HOSPADM

## 2017-02-07 RX ORDER — ONDANSETRON 4 MG/1
4 TABLET, ORALLY DISINTEGRATING ORAL EVERY 4 HOURS PRN
Status: DISCONTINUED | OUTPATIENT
Start: 2017-02-07 | End: 2017-02-09 | Stop reason: HOSPADM

## 2017-02-07 RX ORDER — HEPARIN SODIUM 1000 [USP'U]/ML
5000 INJECTION, SOLUTION INTRAVENOUS; SUBCUTANEOUS ONCE
Status: COMPLETED | OUTPATIENT
Start: 2017-02-07 | End: 2017-02-07

## 2017-02-07 RX ORDER — ATORVASTATIN CALCIUM 40 MG/1
40 TABLET, FILM COATED ORAL NIGHTLY
Status: DISCONTINUED | OUTPATIENT
Start: 2017-02-07 | End: 2017-02-09 | Stop reason: HOSPADM

## 2017-02-07 RX ORDER — AMINOPHYLLINE 25 MG/ML
INJECTION, SOLUTION INTRAVENOUS
Status: COMPLETED
Start: 2017-02-07 | End: 2017-02-07

## 2017-02-07 RX ORDER — DEXTROSE MONOHYDRATE 25 G/50ML
25 INJECTION, SOLUTION INTRAVENOUS
Status: CANCELLED | OUTPATIENT
Start: 2017-02-07

## 2017-02-07 RX ORDER — ALBUTEROL SULFATE 90 UG/1
2 AEROSOL, METERED RESPIRATORY (INHALATION)
Status: DISCONTINUED | OUTPATIENT
Start: 2017-02-07 | End: 2017-02-07 | Stop reason: HOSPADM

## 2017-02-07 RX ADMIN — METHYLPREDNISOLONE SODIUM SUCCINATE 62.5 MG: 125 INJECTION, POWDER, FOR SOLUTION INTRAMUSCULAR; INTRAVENOUS at 11:49

## 2017-02-07 RX ADMIN — METHYLPREDNISOLONE SODIUM SUCCINATE 62.5 MG: 125 INJECTION, POWDER, FOR SOLUTION INTRAMUSCULAR; INTRAVENOUS at 23:13

## 2017-02-07 RX ADMIN — IPRATROPIUM BROMIDE AND ALBUTEROL SULFATE 3 ML: .5; 3 SOLUTION RESPIRATORY (INHALATION) at 11:18

## 2017-02-07 RX ADMIN — AMLODIPINE BESYLATE 5 MG: 5 TABLET ORAL at 22:22

## 2017-02-07 RX ADMIN — METHYLPREDNISOLONE SODIUM SUCCINATE 62.5 MG: 125 INJECTION, POWDER, FOR SOLUTION INTRAMUSCULAR; INTRAVENOUS at 00:01

## 2017-02-07 RX ADMIN — AMINOPHYLLINE 100 MG: 25 INJECTION, SOLUTION INTRAVENOUS at 09:20

## 2017-02-07 RX ADMIN — REGADENOSON 0.4 MG: 0.08 INJECTION, SOLUTION INTRAVENOUS at 09:17

## 2017-02-07 RX ADMIN — METHYLPREDNISOLONE SODIUM SUCCINATE 62.5 MG: 125 INJECTION, POWDER, FOR SOLUTION INTRAMUSCULAR; INTRAVENOUS at 17:03

## 2017-02-07 RX ADMIN — METHYLPREDNISOLONE SODIUM SUCCINATE 62.5 MG: 125 INJECTION, POWDER, FOR SOLUTION INTRAMUSCULAR; INTRAVENOUS at 06:16

## 2017-02-07 RX ADMIN — AZITHROMYCIN 250 MG: 250 TABLET, FILM COATED ORAL at 22:21

## 2017-02-07 RX ADMIN — ATORVASTATIN CALCIUM 40 MG: 40 TABLET, FILM COATED ORAL at 22:21

## 2017-02-07 RX ADMIN — ALBUTEROL SULFATE 2 PUFF: 90 AEROSOL, METERED RESPIRATORY (INHALATION) at 23:12

## 2017-02-07 RX ADMIN — HEPARIN SODIUM 5000 UNITS: 1000 INJECTION, SOLUTION INTRAVENOUS; SUBCUTANEOUS at 18:02

## 2017-02-07 RX ADMIN — HEPARIN SODIUM 1050 UNITS/HR: 5000 INJECTION, SOLUTION INTRAVENOUS at 18:05

## 2017-02-07 RX ADMIN — FUROSEMIDE 20 MG: 10 INJECTION, SOLUTION INTRAVENOUS at 22:26

## 2017-02-07 RX ADMIN — IPRATROPIUM BROMIDE AND ALBUTEROL SULFATE 3 ML: .5; 3 SOLUTION RESPIRATORY (INHALATION) at 06:49

## 2017-02-07 RX ADMIN — IPRATROPIUM BROMIDE AND ALBUTEROL SULFATE 3 ML: .5; 3 SOLUTION RESPIRATORY (INHALATION) at 14:30

## 2017-02-07 RX ADMIN — IPRATROPIUM BROMIDE AND ALBUTEROL SULFATE 3 ML: .5; 3 SOLUTION RESPIRATORY (INHALATION) at 04:58

## 2017-02-07 ASSESSMENT — LIFESTYLE VARIABLES
EVER_SMOKED: YES
ALCOHOL_USE: NO
PACK_YEARS: 50

## 2017-02-07 ASSESSMENT — PAIN SCALES - GENERAL
PAINLEVEL_OUTOF10: 0

## 2017-02-07 NOTE — FLOWSHEET NOTE
02/07/17 0500   Events/Summary/Plan   Non-Invasive Resp Device Site Inspection Completed Intact   Interdisciplinary Plan of Care-Goals (Indications)   Obstructive Ventilatory Defect or Pulmonary Disease without Obvious Obstruction History / Diagnosis;PFT / Reduced Airflow;Physical Exam / Hyperinflation / Wheezing (bronchospasm)   Interdisciplinary Plan of Care-Outcomes    Bronchodilator Outcome Diminished Wheezing and Volume of Air Movement Increased;Improvement in Airflow (peak flow, PFT);Improved Vital Signs and Measures of Gas Exchange   Education   Education Yes - Pt. / Family has been Instructed in use of Respiratory Equipment;Yes - Pt. / Family has been Instructed in use of Respiratory Medications and Adverse Reactions   RT Assessment of Delivered Medications   Evaluation of Medication Delivery Daily Yes-- Pt /Family has been Instructed in use of Respiratory Medications and Adverse Reactions   SVN Group   #SVN Performed Yes   Given By: Mouthpiece   Respiratory WDL   Respiratory (WDL) X   Chest Exam   Work Of Breathing / Effort Mild   Respiration 16   Pulse 62   Breath Sounds   Pre/Post Intervention Post Intervention Assessment   RUL Breath Sounds Expiratory Wheezes   RML Breath Sounds Expiratory Wheezes   RLL Breath Sounds Diminished   YUMIKO Breath Sounds Expiratory Wheezes   LLL Breath Sounds Diminished   Secretions   Cough Non Productive;Congested;Strong;Swallowed   Oximetry   #Pulse Oximetry (Single Determination) Yes   Oxygen   Pulse Oximetry 92 %   O2 (LPM) 4   O2 Daily Delivery Respiratory  Nasal Cannula

## 2017-02-07 NOTE — FLOWSHEET NOTE
02/07/17 1119   Interdisciplinary Plan of Care-Goals (Indications)   Obstructive Ventilatory Defect or Pulmonary Disease without Obvious Obstruction History / Diagnosis   Interdisciplinary Plan of Care-Outcomes    Bronchodilator Outcome Diminished Wheezing and Volume of Air Movement Increased   Education   Education Yes - Pt. / Family has been Instructed in use of Respiratory Medications and Adverse Reactions   RT Assessment of Delivered Medications   Evaluation of Medication Delivery Daily Yes-- Pt /Family has been Instructed in use of Respiratory Medications and Adverse Reactions   SVN Group   #SVN Performed Yes   Given By: Mouthpiece   Respiratory WDL   Respiratory (WDL) X   Chest Exam   Work Of Breathing / Effort Mild   Respiration 17   Pulse 87   Breath Sounds   Pre/Post Intervention Pre Intervention Assessment   RUL Breath Sounds Expiratory Wheezes   RML Breath Sounds Expiratory Wheezes   RLL Breath Sounds Diminished   YUMIKO Breath Sounds Expiratory Wheezes   LLL Breath Sounds Diminished   Secretions   Cough Moist;Productive   How Sputum Obtained Spontaneous   Sputum Amount Unable to Evaluate   Sputum Color Unable to Evaluate   Sputum Consistency Unable to Evaluate   Oximetry   #Pulse Oximetry (Single Determination) Yes   Oxygen   Pulse Oximetry 92 %   O2 (LPM) 3   O2 Daily Delivery Respiratory  Nasal Cannula

## 2017-02-07 NOTE — FLOWSHEET NOTE
02/07/17 1432   Interdisciplinary Plan of Care-Goals (Indications)   Obstructive Ventilatory Defect or Pulmonary Disease without Obvious Obstruction History / Diagnosis   Interdisciplinary Plan of Care-Outcomes    Bronchodilator Outcome Diminished Wheezing and Volume of Air Movement Increased   Education   Education Yes - Pt. / Family has been Instructed in use of Respiratory Medications and Adverse Reactions   RT Assessment of Delivered Medications   Evaluation of Medication Delivery Daily Yes-- Pt /Family has been Instructed in use of Respiratory Medications and Adverse Reactions   SVN Group   #SVN Performed Yes   Given By: Mouthpiece   Respiratory WDL   Respiratory (WDL) X   Chest Exam   Work Of Breathing / Effort Mild   Respiration 17   Pulse 77   Breath Sounds   Pre/Post Intervention Pre Intervention Assessment   RUL Breath Sounds Expiratory Wheezes   RML Breath Sounds Expiratory Wheezes   RLL Breath Sounds Diminished   YUMIKO Breath Sounds Expiratory Wheezes   LLL Breath Sounds Diminished   Secretions   Cough Moist;Productive   How Sputum Obtained Spontaneous   Sputum Amount Unable to Evaluate   Sputum Color Unable to Evaluate   Sputum Consistency Unable to Evaluate   Oximetry   #Pulse Oximetry (Single Determination) Yes   Oxygen   Pulse Oximetry 93 %   O2 (LPM) 3   O2 Daily Delivery Respiratory  Nasal Cannula

## 2017-02-07 NOTE — FLOWSHEET NOTE
02/07/17 0215   Events/Summary/Plan   Events/Summary/Plan Pt refused SVN treatment at this time, Pt awake at this time. Pt does not appear to be in respiratory distress    Non-Invasive Resp Device Site Inspection Completed Intact   Therapy Not Performed   Type of Therapy Not Performed SVN   Reason Therapy Not Performed Refused

## 2017-02-07 NOTE — IP AVS SNAPSHOT
" Home Care Instructions                                                                                                                  Name:Lacey Her  Medical Record Number:[id number]  CSN: [id number]    YOB: 1945   Age: 71 y.o.  Sex: female  HT:1.626 m (5' 4\") WT: 77.7 kg (171 lb 4.8 oz)          Admit Date: 2/7/2017     Discharge Date:   Today's Date: 2/9/2017  Attending Doctor:  Mina Garcias M.D.                  Allergies:  Review of patient's allergies indicates no known allergies.            Discharge Instructions       Discharge Instructions    Discharged to home by car with relative. Discharged via wheelchair, hospital escort: Yes.  Special equipment needed: Oxygen    Be sure to schedule a follow-up appointment with your primary care doctor or any specialists as instructed.     Discharge Plan:   Diet Plan: Discussed  Activity Level: Discussed  Smoking Cessation Offered: Patient Counseled  Confirmed Follow up Appointment: Appointment Scheduled  Confirmed Symptoms Management: Discussed  Medication Reconciliation Updated: Yes  Influenza Vaccine Indication: Not indicated: Previously immunized this influenza season and > 8 years of age    I understand that a diet low in cholesterol, fat, and sodium is recommended for good health. Unless I have been given specific instructions below for another diet, I accept this instruction as my diet prescription.   Other diet: heart healthy    Special Instructions: None    · Is patient discharged on Warfarin / Coumadin?   No     · Is patient Post Blood Transfusion?  No    Angiogram, Care After  These instructions give you information about caring for yourself after your procedure. Your doctor may also give you more specific instructions. Call your doctor if you have any problems or questions after your procedure.   HOME CARE  · Take medicines only as told by your doctor.  · Follow your doctor's instructions about:  · Care of the area where the tube was " inserted.  · Bandage (dressing) changes and removal.  · You may shower 24-48 hours after the procedure or as told by your doctor.  · Do not take baths, swim, or use a hot tub until your doctor approves.  · Every day, check the area where the tube was inserted. Watch for:  · Redness, swelling, or pain.  · Fluid, blood, or pus.  · Do not apply powder or lotion to the site.  · Do not lift anything that is heavier than 10 lb (4.5 kg) for 5 days or as told by your doctor.  · Ask your doctor when you can:  · Return to work or school.  · Do physical activities or play sports.  · Have sex.  · Do not drive or operate heavy machinery for 24 hours or as told by your doctor.  · Have someone with you for the first 24 hours after the procedure.  · Keep all follow-up visits as told by your doctor. This is important.  GET HELP IF:  · You have a fever.    · You have chills.    · You have more bleeding from the area where the tube was inserted. Hold pressure on the area.  · You have redness, swelling, or pain in the area where the tube was inserted.  · You have fluid or pus coming from the area.  GET HELP RIGHT AWAY IF:   · You have a lot of pain in the area where the tube was inserted.  · The area where the tube was inserted is bleeding, and the bleeding does not stop after 30 minutes of holding steady pressure on the area.  · The area near or just beyond the insertion site becomes pale, cool, tingly, or numb.     This information is not intended to replace advice given to you by your health care provider. Make sure you discuss any questions you have with your health care provider.     Document Released: 03/16/2010 Document Revised: 01/08/2016 Document Reviewed: 07/06/2015  [employer] Interactive Patient Education ©2016 [employer]    Heart-Healthy Eating Plan  Many factors influence your heart health, including eating and exercise habits. Heart (coronary) risk increases with abnormal blood fat (lipid) levels. Heart-healthy meal  "planning includes limiting unhealthy fats, increasing healthy fats, and making other small dietary changes. This includes maintaining a healthy body weight to help keep lipid levels within a normal range.  WHAT IS MY PLAN?   Your health care provider recommends that you:  · Get no more than _________% of the total calories in your daily diet from fat.  · Limit your intake of saturated fat to less than _________% of your total calories each day.  · Limit the amount of cholesterol in your diet to less than _________ mg per day.  WHAT TYPES OF FAT SHOULD I CHOOSE?  · Choose healthy fats more often. Choose monounsaturated and polyunsaturated fats, such as olive oil and canola oil, flaxseeds, walnuts, almonds, and seeds.  · Eat more omega-3 fats. Good choices include salmon, mackerel, sardines, tuna, flaxseed oil, and ground flaxseeds. Aim to eat fish at least two times each week.  · Limit saturated fats. Saturated fats are primarily found in animal products, such as meats, butter, and cream. Plant sources of saturated fats include palm oil, palm kernel oil, and coconut oil.  · Avoid foods with partially hydrogenated oils in them. These contain trans fats. Examples of foods that contain trans fats are stick margarine, some tub margarines, cookies, crackers, and other baked goods.  WHAT GENERAL GUIDELINES DO I NEED TO FOLLOW?  · Check food labels carefully to identify foods with trans fats or high amounts of saturated fat.  · Fill one half of your plate with vegetables and green salads. Eat 4-5 servings of vegetables per day. A serving of vegetables equals 1 cup of raw leafy vegetables, ½ cup of raw or cooked cut-up vegetables, or ½ cup of vegetable juice.  · Fill one fourth of your plate with whole grains. Look for the word \"whole\" as the first word in the ingredient list.  · Fill one fourth of your plate with lean protein foods.  · Eat 4-5 servings of fruit per day. A serving of fruit equals one medium whole fruit, ¼ " cup of dried fruit, ½ cup of fresh, frozen, or canned fruit, or ½ cup of 100% fruit juice.  · Eat more foods that contain soluble fiber. Examples of foods that contain this type of fiber are apples, broccoli, carrots, beans, peas, and barley. Aim to get 20-30 g of fiber per day.  · Eat more home-cooked food and less restaurant, buffet, and fast food.  · Limit or avoid alcohol.  · Limit foods that are high in starch and sugar.  · Avoid fried foods.  · Cook foods by using methods other than frying. Baking, boiling, grilling, and broiling are all great options. Other fat-reducing suggestions include:  ¨ Removing the skin from poultry.  ¨ Removing all visible fats from meats.  ¨ Skimming the fat off of stews, soups, and gravies before serving them.  ¨ Steaming vegetables in water or broth.  · Lose weight if you are overweight. Losing just 5-10% of your initial body weight can help your overall health and prevent diseases such as diabetes and heart disease.  · Increase your consumption of nuts, legumes, and seeds to 4-5 servings per week. One serving of dried beans or legumes equals ½ cup after being cooked, one serving of nuts equals 1½ ounces, and one serving of seeds equals ½ ounce or 1 tablespoon.  · You may need to monitor your salt (sodium) intake, especially if you have high blood pressure. Talk with your health care provider or dietitian to get more information about reducing sodium.  WHAT FOODS CAN I EAT?  Grains  Breads, including [place]er, white, reed, wheat, raisin, rye, oatmeal, and Italian. Tortillas that are neither fried nor made with lard or trans fat. Low-fat rolls, including hotdog and hamburger buns and English muffins. Biscuits. Muffins. Waffles. Pancakes. Light popcorn. Whole-grain cereals. Flatbread. Willow toast. Pretzels. Breadsticks. Rusks. Low-fat snacks and crackers, including oyster, saltine, matzo, les, animal, and rye. Rice and pasta, including brown rice and those that are made with whole  wheat.  Vegetables  All vegetables.  Fruits  All fruits, but limit coconut.  Meats and Other Protein Sources  Lean, well-trimmed beef, veal, pork, and lamb. Chicken and turkey without skin. All fish and shellfish. Wild duck, rabbit, pheasant, and venison. Egg whites or low-cholesterol egg substitutes. Dried beans, peas, lentils, and tofu. Seeds and most nuts.  Dairy  Low-fat or nonfat cheeses, including ricotta, string, and mozzarella. Skim or 1% milk that is liquid, powdered, or evaporated. Buttermilk that is made with low-fat milk. Nonfat or low-fat yogurt.  Beverages  Mineral water. Diet carbonated beverages.  Sweets and Desserts  Sherbets and fruit ices. Honey, jam, marmalade, jelly, and syrups. Meringues and gelatins. Pure sugar candy, such as hard candy, jelly beans, gumdrops, mints, marshmallows, and small amounts of dark chocolate. Mir food cake.  Eat all sweets and desserts in moderation.  Fats and Oils  Nonhydrogenated (trans-free) margarines. Vegetable oils, including soybean, sesame, sunflower, olive, peanut, safflower, corn, canola, and cottonseed. Salad dressings or mayonnaise that are made with a vegetable oil. Limit added fats and oils that you use for cooking, baking, salads, and as spreads.  Other  Cocoa powder. Coffee and tea. All seasonings and condiments.  The items listed above may not be a complete list of recommended foods or beverages. Contact your dietitian for more options.  WHAT FOODS ARE NOT RECOMMENDED?  Grains  Breads that are made with saturated or trans fats, oils, or whole milk. Croissants. Butter rolls. Cheese breads. Sweet rolls. Donuts. Buttered popcorn. Chow mein noodles. High-fat crackers, such as cheese or butter crackers.  Meats and Other Protein Sources  Fatty meats, such as hotdogs, short ribs, sausage, spareribs, collins, ribeye roast or steak, and mutton. High-fat deli meats, such as salami and bologna. Caviar. Domestic duck and goose. Organ meats, such as kidney, liver,  sweetbreads, brains, gizzard, chitterlings, and heart.  Dairy  Cream, sour cream, cream cheese, and creamed cottage cheese. Whole milk cheeses, including blue (faviloa), [place] Vladimir, Brie, [place]an, American, Havarti, Swiss, cheddar, Camembert, and Troutman.  Whole or 2% milk that is liquid, evaporated, or condensed. Whole buttermilk. Cream sauce or high-fat cheese sauce. Yogurt that is made from whole milk.  Beverages  Regular sodas and drinks with added sugar.  Sweets and Desserts  Frosting. Pudding. Cookies. Cakes other than sania food cake. Candy that has milk chocolate or white chocolate, hydrogenated fat, butter, coconut, or unknown ingredients. Buttered syrups. Full-fat ice cream or ice cream drinks.  Fats and Oils  Gravy that has suet, meat fat, or shortening. Cocoa butter, hydrogenated oils, palm oil, coconut oil, palm kernel oil. These can often be found in baked products, candy, fried foods, nondairy creamers, and whipped toppings. Solid fats and shortenings, including collins fat, salt pork, lard, and butter. Nondairy cream substitutes, such as coffee creamers and sour cream substitutes. Salad dressings that are made of unknown oils, cheese, or sour cream.  The items listed above may not be a complete list of foods and beverages to avoid. Contact your dietitian for more information.     This information is not intended to replace advice given to you by your health care provider. Make sure you discuss any questions you have with your health care provider.     Document Released: 09/26/2009 Document Revised: 01/08/2016 Document Reviewed: 06/11/2015  [employer] Interactive Patient Education ©2016 [employer]        Depression / Suicide Risk    As you are discharged from this [facility] facility, it is important to learn how to keep safe from harming yourself.    Recognize the warning signs:  · Abrupt changes in personality, positive or negative- including increase in energy   · Giving away possessions  · Change  in eating patterns- significant weight changes-  positive or negative  · Change in sleeping patterns- unable to sleep or sleeping all the time   · Unwillingness or inability to communicate  · Depression  · Unusual sadness, discouragement and loneliness  · Talk of wanting to die  · Neglect of personal appearance   · Rebelliousness- reckless behavior  · Withdrawal from people/activities they love  · Confusion- inability to concentrate     If you or a loved one observes any of these behaviors or has concerns about self-harm, here's what you can do:  · Talk about it- your feelings and reasons for harming yourself  · Remove any means that you might use to hurt yourself (examples: pills, rope, extension cords, firearm)  · Get professional help from the community (Mental Health, Substance Abuse, psychological counseling)  · Do not be alone:Call your Safe Contact- someone whom you trust who will be there for you.  · Call your local CRISIS HOTLINE 557-9691 or 315-760-2350  · Call your local Children's Mobile Crisis Response Team [place] (869) 730-9021 or www.Visier  · Call the toll free National Suicide Prevention Hotlines   · National Suicide Prevention Lifeline 875-591-JJZD (3045)  · National Hope Line Network 800-SUICIDE (807-1129)        Follow-up Information     1. Follow up with HANANE [facility]. Go on 2/24/2017.    Specialty:  Cardiology    Why:  Please arrive at 11:30am for a 11:40am appointment. Thank you    Contact information    [address]  [address]  [place] [address] [phone]          2. Follow up with Mary Arias D.O.. Call in 1 week.    Specialty:  Family Medicine    Why:  Please follow-up with your primary to review the record of your hospital stay.    Contact information    [address]  [place] [address] [phone]           Discharge Medication Instructions:    Below are the medications your physician expects you to take upon discharge:    Review all  your home medications and newly ordered medications with your doctor and/or pharmacist. Follow medication instructions as directed by your doctor and/or pharmacist.    Please keep your medication list with you and share with your physician.               Medication List      START taking these medications        Instructions    aspirin 81 MG EC tablet   Last time this was given:  81 mg on 2/9/2017  8:30 AM    Take 1 Tab by mouth every day.   Dose:  81 mg       azithromycin 250 MG Tabs   Last time this was given:  250 mg on 2/8/2017  8:56 PM   Commonly known as:  ZITHROMAX    1 tab daily       benzonatate 100 MG Caps   Last time this was given:  100 mg on 2/8/2017  8:57 PM   Commonly known as:  TESSALON    Take 1 Cap by mouth 3 times a day as needed for Cough.   Dose:  100 mg       fluticasone 110 MCG/ACT Aero   Last time this was given:  220 mcg on 2/9/2017  8:05 AM   Commonly known as:  FLOVENT HFA    Inhale 2 Puffs by mouth every 12 hours.   Dose:  2 Puff       furosemide 20 MG Tabs   Last time this was given:  20 mg on 2/9/2017  8:30 AM   Commonly known as:  LASIX    Take 1 Tab by mouth every day.   Dose:  20 mg       guaifenesin -10 MG/5ML Syrp syrup   Last time this was given:  10 mL on 2/8/2017  4:44 PM   Commonly known as:  ROBITUSSIN DM    Take 10 mL by mouth every 6 hours as needed for Cough.   Dose:  10 mL       MethylPREDNISolone 4 MG Tbpk   Commonly known as:  MEDROL DOSEPAK    Take as directed       potassium chloride SA 20 MEQ Tbcr   Last time this was given:  20 mEq on 2/9/2017  8:30 AM   Commonly known as:  Kdur    Take 1 Tab by mouth every day.   Dose:  20 mEq         CONTINUE taking these medications        Instructions    amlodipine 5 MG Tabs   Last time this was given:  5 mg on 2/9/2017  8:30 AM   Commonly known as:  NORVASC    Take 5 mg by mouth 2 Times a Day.   Dose:  5 mg       atorvastatin 40 MG Tabs   Last time this was given:  40 mg on 2/8/2017  8:57 PM   Commonly known as:  LIPITOR       Take 40 mg by mouth every evening.   Dose:  40 mg       baclofen 10 MG Tabs   Last time this was given:  10 mg on 2/9/2017 11:37 AM   Commonly known as:  LIORESAL    Take 10 mg by mouth every day.   Dose:  10 mg       losartan-hydrochlorothiazide 50-12.5 MG per tablet   Commonly known as:  HYZAAR    Take 1 Tab by mouth every day.   Dose:  1 Tab       metformin 500 MG Tabs   Commonly known as:  GLUCOPHAGE    Take 500 mg by mouth 2 times a day, with meals.   Dose:  500 mg       metoprolol  MG Tb24   Last time this was given:  100 mg on 2/9/2017  8:31 AM   Commonly known as:  TOPROL XL    Take 100 mg by mouth every day.   Dose:  100 mg       VENTOLIN  (90 BASE) MCG/ACT Aers inhalation aerosol   Last time this was given:  2 Puffs on 2/8/2017  6:13 AM   Generic drug:  albuterol    Inhale 1 Puff by mouth every 6 hours as needed for Shortness of Breath.   Dose:  1 Puff               Instructions           Diet / Nutrition:    Follow any diet instructions given to you by your doctor or the dietician, including how much salt (sodium) you are allowed each day.    If you are overweight, talk to your doctor about a weight reduction plan.    Activity:    Remain physically active following your doctor's instructions about exercise and activity.    Rest often.     Any time you become even a little tired or short of breath, SIT DOWN and rest.    Worsening Symptoms:    Report any of the following signs and symptoms to the doctor's office immediately:    *Pain of jaw, arm, or neck  *Chest pain not relieved by medication                               *Dizziness or loss of consciousness  *Difficulty breathing even when at rest   *More tired than usual                                       *Bleeding drainage or swelling of surgical site  *Swelling of feet, ankles, legs or stomach                 *Fever (>100ºF)  *Pink or blood tinged sputum  *Weight gain (3lbs/day or 5lbs /week)           *Shock from internal  defibrillator (if applicable)  *Palpitations or irregular heartbeats                *Cool and/or numb extremities    Stroke Awareness    Common Risk Factors for Stroke include:    Age  Atrial Fibrillation  Carotid Artery Stenosis  Diabetes Mellitus  Excessive alcohol consumption  High blood pressure  Overweight   Physical inactivity  Smoking    Warning signs and symptoms of a stroke include:    *Sudden numbness or weakness of the face, arm or leg (especially on one side of the body).  *Sudden confusion, trouble speaking or understanding.  *Sudden trouble seeing in one or both eyes.  *Sudden trouble walking, dizziness, loss of balance or coordination.Sudden severe headache with no known cause.    It is very important to get treatment quickly when a stroke occurs. If you experience any of the above warning signs, call 911 immediately.                   Disclaimer         Quit Smoking / Tobacco Use:    I understand the use of any tobacco products increases my chance of suffering from future heart disease or stroke and could cause other illnesses which may shorten my life. Quitting the use of tobacco products is the single most important thing I can do to improve my health. For further information on smoking / tobacco cessation call a Toll Free Quit Line at 1-311.658.3589 (*National Cancer [place]) or 1-594.828.4963 (American Lung Association) or you can access the web based program at www.lungusa.org.    [place] Tobacco Users Help Line:  (634) 217-8336       Toll Free: 1-526.645.4884  Quit Tobacco Program [facility] (592)703-6078    Crisis Hotline:    [place] Crisis Hotline:  8-633-ZVWBLDK or 1-833.801.7951    [place] Crisis Hotline:    1-502.701.3972 or 269-045-0459    Discharge Survey:   Thank you for choosing [facility]. We hope we did everything we could to make your hospital stay a pleasant one. You may be receiving a phone survey and we would appreciate your time and participation in  answering the questions. Your input is very valuable to us in our efforts to improve our service to our patients and their families.        My signature on this form indicates that:    1. I have reviewed and understand the above information.  2. My questions regarding this information have been answered to my satisfaction.  3. I have formulated a plan with my discharge nurse to obtain my prescribed medications for home.                  Disclaimer         __________________________________                     __________       ________                       Patient Signature                                                 Date                    Time

## 2017-02-07 NOTE — CARE PLAN
Problem: Safety  Goal: Will remain free from injury  Outcome: PROGRESSING AS EXPECTED  Call light and personal possessions within reach. Pt encouraged to call for assistance. Bed in low position, bed alarm in use. Treaded slipper socks on pt. Appropriate signs in place.    Problem: Respiratory:  Goal: Respiratory status will improve  Outcome: PROGRESSING AS EXPECTED  RT protocol in place. Assess/monitor O2 saturation. Titrate oxygen to keep sats >90%. Pt encouraged to call for assistance for any respiratory distress.

## 2017-02-07 NOTE — PROGRESS NOTES
Received bedside report from Enma ARIAS. Assumed care of pt who is sitting up in bed doing respiratory treatment RR even/unlabored. Fall protocol in place. CLIP. Will continue to monitor.

## 2017-02-07 NOTE — CARE PLAN
Problem: Oxygenation:  Goal: Maintain adequate oxygenation dependent on patient condition  Outcome: PROGRESSING SLOWER THAN EXPECTED  Titrate and maintain saturation by pulse oximetry >90%. Current fiO2 4 lpm nasal cannula.        Problem: Bronchoconstriction:  Goal: Improve in air movement and diminished wheezing  Outcome: PROGRESSING SLOWER THAN EXPECTED  Diminished wheezing and volume of air movement increased     Improvement in airflow    Improved vital signs and measures of gas exchange    Improved patient appearance with subjective improvement of symptom alleviation after treatment.        Intervention: Implement inhaled treatments  Duoneb Q4 ATC and reassess in 24 hrs

## 2017-02-07 NOTE — H&P
PRIMARY CARE PHYSICIAN:  Vannesa Arias DO    CHIEF COMPLAINT:  Shortness of breath, cough.    SUBJECTIVE/HISTORY OF PRESENT ILLNESS:  The patient is a 71-year-old female   with a remote history of tobacco abuse who no longer smokes, history of   coronary artery disease status post CABG, hyperlipidemia, and diabetes, who   presents with shortness of breath.    She says she first began feeling short of breath about 3 days ago.  She   noticed that she was becoming very winded with walking.  This would improve   somewhat with rest.  No changes with body position.  She also noted she was   having a productive cough with yellow sputum.  She has never had symptoms like   this previously.  She does describe wheezing that occurs at rest and gets   worse with exertion.  She does not wear home oxygen.  She has tried to talk to   her primary care physician about; however, says she is unable to schedule an   appointment regarding this.  She does have an albuterol rescue inhaler, which   she has been taking, but this is not helping.  She denies any swelling of her   extremities.  She has had no chest discomfort, no palpitations, dizziness,   nausea, or vomiting.  She denies any changes in urination or bowel habits.  No   focal weakness of any of her extremities.  She has not had any loss of   consciousness.  No blood in her cough.  All other systems reviewed and   otherwise negative aside from mentioned above.    ER COURSE:  She was noted to be hypoxic in the low 80s on room air.  She   improved to 92% on 5 liters nasal cannula.  She was given DuoNebs and did feel   slightly symptomatically better; however, not at her baseline.  She is   requesting to have her cardiology tests that were ordered as an outpatient   done while here in the hospital.    REVIEW OF SYSTEMS:  All other systems otherwise negative aside from mentioned   above in the HPI.    SOCIAL HISTORY:  Former smoker, a pack a day, quit in November 2016.  She    denies recreational drugs or alcohol.    PAST MEDICAL HISTORY:  COPD, coronary artery disease, hypertension,   hyperlipidemia, and diabetes.    PAST SURGICAL HISTORY:  CABG x4.    ALLERGIES:  No known medication allergies.    MEDICATIONS PRIOR TO ADMISSION:  Please see med rec for dosages.  Albuterol as   needed, metoprolol XL, atorvastatin 40 mg, Norvasc 5 mg, Hyzaar 50/12.5 mg,   metformin 500 mg b.i.d., and baclofen 10 mg daily.    FAMILY HISTORY:  She denies any family history of heart attack in first-degree   relatives.   No known family history of stroke.  She said her mother had   congestive heart failure, however.    OBJECTIVE:  VITAL SIGNS:  Blood pressure 164/81, pulse 67, temperature 36.5, respiration   22, and oxygen saturations 94% on 5 liters nasal cannula.  GENERAL:  Appears uncomfortable, sitting at the side of the bed with her feet   hanging.  She is in a tripod position.  PSYCHIATRIC:  Alert and oriented, normal mood and affect.  HEENT:  Dry mucous membranes.  Normal mentation.  She has a nasal cannula in   place.  NECK:  Trachea is midline.  Nonpalpable thyroid.  HEART:  Tachycardic, regular rhythm.  No murmurs.  No peripheral edema.    Radial pulses are 2+ bilaterally.  LUNGS:  She has in inspiratory and expiratory wheezes bilaterally.  Prolonged   expiratory phase.  Accessory muscle use is present.  She is also tachypneic.    No rhonchi.  ABDOMEN:  Soft, nontender, and nondistended.  No hepatomegaly.  SKIN:  Warm and dry.  No rashes in visible areas.  No mottling.  Good   capillary refill.  NEUROLOGIC:  Cranial nerves are intact.  Sensation is symmetrical in upper and   lower extremities bilaterally.    PERTINENT LABORATORY VALUES:  CBC is normal.  Metabolic panel is normal aside   from glucose 111 and creatinine is 0.72.    IMAGING:  Chest x-ray reviewed independently and per radiology, cardiomegaly   with interstitial edema present, bibasilar atelectasis is also present.    ASSESSMENT AND  PLAN:  The patient is a 71-year-old female with history of   chronic obstructive pulmonary disease and coronary artery disease, status post   coronary artery bypass graft, who presents with progressive shortness of   breath for last 3 days.  1.  Acute respiratory failure with hypoxia due to chronic obstructive   pulmonary disease with acute exacerbation.  She has no evidence of pneumonia.    She has had a productive cough, this is likely more related to chronic   obstructive pulmonary disease rather than infection.  I will write for   azithromycin for decontamination in the setting of chronic obstructive   pulmonary disease exacerbation.  We will also give her IV Solu-Medrol 60 mg q.   6 hours and transition to p.o. when her oxygen requirements are less.  I will   order DuoNebs per RT protocol.  She should likely have PFTs when her symptoms   resolved; however, may very well need inhalers prescribed for her on   discharge for example Symbicort and/or Spiriva.  She does have a suggestion of   pulmonary edema on her chest x-ray.  She does have a history of coronary   artery disease.  I will order a BNP and an echocardiogram for further   evaluation of the suggestion of her pulmonary edema.  I had also ordered a   stress test in the morning and she says she was supposed to have this done as   an outpatient.  2.  Coronary artery disease.  I will continue her medications including   atorvastatin and metoprolol.  She is not on aspirin.  I will start this   medication.  Stress test this morning, she cannot remember the name of her   cardiologist.  3.  Elevated troponin, mild ____ in the morning.  If this stays the same, I   will continue her cardiac medications and an aspirin.  4.  Hypercalcemia, may be due to volume depletion.  However, in the setting of   smoking history, she does have risk factors for lung cancer, which could   cause hypercalcemia.  We will repeat in the morning.  I will also check an   ionized  calcium.  5.  Diabetes.  I will provide sliding scale and hold her metformin.  6.  Hyperlipidemia.  Continue atorvastatin.  I will check a fasting lipid   panel.  7.  Deep venous thrombosis prophylaxis, Lovenox.  8.  She will be admitted under inpatient status given her acute respiratory   failure with hypoxia.  She may eventually need Lasix if she should have   evidence of edema clinically.  She will be admitted under inpatient status.    She will require greater than 2 midnights.    Her code status is full.       ____________________________________     Jessica Pérez, DO LLANOS / MALIA    DD:  02/06/2017 14:56:04  DT:  02/07/2017 00:39:53    D#:  [id number]  Job#:  [id number]

## 2017-02-07 NOTE — FLOWSHEET NOTE
02/07/17 0652   Interdisciplinary Plan of Care-Goals (Indications)   Obstructive Ventilatory Defect or Pulmonary Disease without Obvious Obstruction History / Diagnosis   Interdisciplinary Plan of Care-Outcomes    Bronchodilator Outcome Diminished Wheezing and Volume of Air Movement Increased   Education   Education Yes - Pt. / Family has been Instructed in use of Respiratory Equipment;Yes - Pt. / Family has been Instructed in use of Respiratory Medications and Adverse Reactions   RT Assessment of Delivered Medications   Evaluation of Medication Delivery Daily Yes-- Pt /Family has been Instructed in use of Respiratory Medications and Adverse Reactions   SVN Group   #SVN Performed Yes   Given By: Mouthpiece   Date SVN Last Changed 02/06/17   Date SVN Next Change Due (Q 7 Days) 02/13/17   Respiratory WDL   Respiratory (WDL) X   Chest Exam   Work Of Breathing / Effort Mild   Respiration 17   Pulse 66   Breath Sounds   Pre/Post Intervention Pre Intervention Assessment   RUL Breath Sounds Expiratory Wheezes   RML Breath Sounds Expiratory Wheezes   RLL Breath Sounds Diminished   YUMIKO Breath Sounds Diminished   Secretions   Cough Moist;Productive   How Sputum Obtained Spontaneous   Sputum Amount Unable to Evaluate   Sputum Color Unable to Evaluate   Sputum Consistency Unable to Evaluate   Oximetry   #Pulse Oximetry (Single Determination) Yes   Oxygen   Pulse Oximetry 93 %   O2 (LPM) 4   O2 Daily Delivery Respiratory  Nasal Cannula

## 2017-02-07 NOTE — PROGRESS NOTES
Admit profile and med rec completed. Oriented to room, unit, and routine. Assessment completed--see doc flowsheet. A&Ox4. O2 per NC on 4L; RT at bedside. POC discussed, verbalized understanding. Call light and personal possessions within reach, bed in low position, encouraged to call for assistance.

## 2017-02-07 NOTE — PROGRESS NOTES
Report given to [facility] nurse, Enma ARIAS. Pt remains calm and cooperative. All lines remain patent. Safety precautions remain in place. POC discussed with pt and RN at the bedside.

## 2017-02-07 NOTE — IP AVS SNAPSHOT
[employer] Access Code: [email or web address]  Expires: 3/11/2017  6:36 PM    Your email address is not on file at [facility].  Email Addresses are required for you to sign up for [employer], please contact [phone] to verify your personal information and to provide your email address prior to attempting to register for [employer].    Lacey Her  [address] [place] [address]    [employer]  A secure, online tool to manage your health information     [facility]’s [employer]® is a secure, online tool that connects you to your personalized health information from the privacy of your home -- day or night - making it very easy for you to manage your healthcare. Once the activation process is completed, you can even access your medical information using the [employer] milana, which is available for free in the Apple Milana store or Google Play store.     To learn more about [employer], visit [email or web address]    There are two levels of access available (as shown below):   My Chart Features  [facility] Primary Care Doctor [facility]  Specialists [facility]  Urgent  Care Non-[facility] Primary Care Doctor   Email your healthcare team securely and privately 24/7 X X X    Manage appointments: schedule your next appointment; view details of past/upcoming appointments X      Request prescription refills. X      View recent personal medical records, including lab and immunizations X X X X   View health record, including health history, allergies, medications X X X X   Read reports about your outpatient visits, procedures, consult and ER notes X X X X   See your discharge summary, which is a recap of your hospital and/or ER visit that includes your diagnosis, lab results, and care plan X X  X     How to register for [employer]:  Once your e-mail address has been verified, follow the following steps to sign up for [employer].     1. Go to  [email or web address]  2. Click on the Sign Up Now box, which takes you to the New Member Sign Up page. You will  need to provide the following information:  a. Enter your [employer] Access Code exactly as it appears at the top of this page. (You will not need to use this code after you’ve completed the sign-up process. If you do not sign up before the expiration date, you must request a new code.)   b. Enter your date of birth.   c. Enter your home email address.   d. Click Submit, and follow the next screen’s instructions.  3. Create a [employer] ID. This will be your [employer] login ID and cannot be changed, so think of one that is secure and easy to remember.  4. Create a [employer] password. You can change your password at any time.  5. Enter your Password Reset Question and Answer. This can be used at a later time if you forget your password.   6. Enter your e-mail address. This allows you to receive e-mail notifications when new information is available in [employer].  7. Click Sign Up. You can now view your health information.    For assistance activating your [employer] account, call [phone]

## 2017-02-07 NOTE — IP AVS SNAPSHOT
" <p align=\"LEFT\"><IMG SRC=\"//[email or web address]$/[email or web address]\" alt=\"Image\" WIDTH=\"50%\" HEIGHT=\"200\" BORDER=\"\"></p>                   Name:Lacey Her  Medical Record Number:[id number]  CSN: [id number]    YOB: 1945   Age: 71 y.o.  Sex: female  HT:1.626 m (5' 4\") WT: 77.7 kg (171 lb 4.8 oz)          Admit Date: 2/7/2017     Discharge Date:   Today's Date: 2/9/2017  Attending Doctor:  Mina Garcias M.D.                  Allergies:  Review of patient's allergies indicates no known allergies.          Follow-up Information     1. Follow up with HANANE [facility]. Go on 2/24/2017.    Specialty:  Cardiology    Why:  Please arrive at 11:30am for a 11:40am appointment. Thank you    Contact information    [address]  [address]  [facility] [address] [phone]          2. Follow up with Mary Arias D.O.. Call in 1 week.    Specialty:  Family Medicine    Why:  Please follow-up with your primary to review the record of your hospital stay.    Contact information    [address]  [facility] [id number] [phone]           Medication List      Take these Medications        Instructions    amlodipine 5 MG Tabs   Commonly known as:  NORVASC    Take 5 mg by mouth 2 Times a Day.   Dose:  5 mg       aspirin 81 MG EC tablet    Take 1 Tab by mouth every day.   Dose:  81 mg       atorvastatin 40 MG Tabs   Commonly known as:  LIPITOR    Take 40 mg by mouth every evening.   Dose:  40 mg       azithromycin 250 MG Tabs   Commonly known as:  ZITHROMAX    1 tab daily       baclofen 10 MG Tabs   Commonly known as:  LIORESAL    Take 10 mg by mouth every day.   Dose:  10 mg       benzonatate 100 MG Caps   Commonly known as:  TESSALON    Take 1 Cap by mouth 3 times a day as needed for Cough.   Dose:  100 mg       fluticasone 110 MCG/ACT Aero   Commonly known as:  FLOVENT HFA    Inhale 2 Puffs by mouth every 12 hours.   Dose:  2 Puff       furosemide 20 MG Tabs   Commonly known as:  LASIX    Take 1 Tab by " mouth every day.   Dose:  20 mg       guaifenesin -10 MG/5ML Syrp syrup   Commonly known as:  ROBITUSSIN DM    Take 10 mL by mouth every 6 hours as needed for Cough.   Dose:  10 mL       losartan-hydrochlorothiazide 50-12.5 MG per tablet   Commonly known as:  HYZAAR    Take 1 Tab by mouth every day.   Dose:  1 Tab       metformin 500 MG Tabs   Commonly known as:  GLUCOPHAGE    Take 500 mg by mouth 2 times a day, with meals.   Dose:  500 mg       MethylPREDNISolone 4 MG Tbpk   Commonly known as:  MEDROL DOSEPAK    Take as directed       metoprolol  MG Tb24   Commonly known as:  TOPROL XL    Take 100 mg by mouth every day.   Dose:  100 mg       potassium chloride SA 20 MEQ Tbcr   Commonly known as:  Kdur    Take 1 Tab by mouth every day.   Dose:  20 mEq       VENTOLIN  (90 BASE) MCG/ACT Aers inhalation aerosol   Generic drug:  albuterol    Inhale 1 Puff by mouth every 6 hours as needed for Shortness of Breath.   Dose:  1 Puff

## 2017-02-07 NOTE — FLOWSHEET NOTE
02/06/17 2213   Events/Summary/Plan   Non-Invasive Resp Device Site Inspection Completed Intact   Interdisciplinary Plan of Care-Goals (Indications)   Obstructive Ventilatory Defect or Pulmonary Disease without Obvious Obstruction History / Diagnosis;PFT / Reduced Airflow   Interdisciplinary Plan of Care-Outcomes    Bronchodilator Outcome Diminished Wheezing and Volume of Air Movement Increased;Improved Vital Signs and Measures of Gas Exchange;Improvement in Airflow (peak flow, PFT)   Education   Education Yes - Pt. / Family has been Instructed in use of Respiratory Equipment;Yes - Pt. / Family has been Instructed in use of Respiratory Medications and Adverse Reactions   RT Assessment of Delivered Medications   Evaluation of Medication Delivery Daily Yes-- Pt /Family has been Instructed in use of Respiratory Medications and Adverse Reactions   SVN Group   #SVN Performed Yes   Given By: Mouthpiece   Respiratory WDL   Respiratory (WDL) X   Chest Exam   Work Of Breathing / Effort Mild   Respiration 16   Pulse 70   Breath Sounds   Pre/Post Intervention Pre Intervention Assessment   RUL Breath Sounds Expiratory Wheezes   RML Breath Sounds Expiratory Wheezes   RLL Breath Sounds Diminished   YUMIKO Breath Sounds Expiratory Wheezes   LLL Breath Sounds Diminished   Secretions   Cough Non Productive;Congested;Strong;Swallowed   How Sputum Obtained Spontaneous   Sputum Amount Unable to Evaluate   Sputum Color Unable to Evaluate   Sputum Consistency Unable to Evaluate   Oximetry   #Pulse Oximetry (Single Determination) Yes   Oxygen   Home O2 Use Prior To Admission? No   Pulse Oximetry 97 %   O2 (LPM) 4  (found on 5 lpm titrated down to 4 lpm )   O2 Daily Delivery Respiratory  Nasal Cannula

## 2017-02-07 NOTE — PROGRESS NOTES
Due medications administered per MAR, VSS, patient provided with mouth swabs to moisten mucosa, patient denies any needs at this time, safety precautions in place, CLIP

## 2017-02-07 NOTE — CARE PLAN
Problem: Communication  Goal: The ability to communicate needs accurately and effectively will improve  Oriented patient to the call light in order to alert staff of her needs. Educated patient about the plan of care, procedures, treatments, medications, and allowing for patient questions and answering them appropriately    Problem: Safety  Goal: Will remain free from falls  Assessing patient for risk factors for falls and implementing fall precautions as needed. Bed controls are on and bed is locked in a low position, treaded slipper socks on patient, CLIP    Problem: Venous Thromboembolism (VTW)/Deep Vein Thrombosis (DVT) Prevention:  Goal: Patient will participate in Venous Thrombosis (VTE)/Deep Vein Thrombosis (DVT)Prevention Measures  Administering anticoagulant as ordered and assessing and monitoring for anticoagulation complications. Encouraging the patient to perform range of motion exercises while laying in bed    Problem: Respiratory:  Goal: Respiratory status will improve  Assessing and monitoring patient's pulmonary status, administering and titrating oxygen therapy as needed. Educating patient to perform coughing and deep breathing

## 2017-02-07 NOTE — PROGRESS NOTES
Pt back from stress test, medicated per mar. Would like lunch. Called dietary. Denies other needs at this time. Fall protocol in place.

## 2017-02-07 NOTE — RESPIRATORY CARE
"COPD EDUCATION by COPD CLINICAL EDUCATOR  2/7/2017  at  1:30 PM by Tia Em     Patient interviewed by COPD education team.  Patient unable to participate in full program.  Short intervention was completed with this patient covering: What is COPD (how the lungs work), daily medications rescue and maintenance, breathing techniques, infection prevention and oxygen safety were covered in detail.  A comprehensive packet including information about COPD, treatments, and oxygen safety was given.       Provided spacer and aerobika with instruction for use, care and cleaning.        Referral faxed to [facility] for a new patient pulmonary appointment. [facility] will notify patient of appointment date and time.    Smoking Cessation Intervention 3 -10 minutes completed.     Provided smoking cessation packet with \"Tips to Quit\" and flyer for \"Free Smoking Cessation Classes\". Provided \"Quit Card\" with the phone number to [place] [employer] [facility] for 8 weeks of free nicotine replacement therapy.   Provided coupon for [place].  "

## 2017-02-07 NOTE — PROGRESS NOTES
Tele Note    Rhythm Sinus Emery, Tachy, Sinus Rhythm   Rate   WV 0.14  QRS 0.10  QT 0.34  Ectopy occasional PVC

## 2017-02-07 NOTE — PROGRESS NOTES
Monitor Summary    Rhythm: Sinus rhythm/Sinus gerber  Ectopy: occas PVC  NM: .18   QRS: .10   QT: .40

## 2017-02-07 NOTE — CARE PLAN
Problem: Safety  Goal: Will remain free from falls  Intervention: Implement fall precautions    02/06/17 1530 02/06/17 2000 02/07/17 0700   OTHER   Environmental Precautions --  --  Personal Belongings, Wastebasket, Call Bell etc. in Easy Reach;Treaded Slipper Socks on Patient;Transferred to Stronger Side;Report Given to Other Health Care Providers Regarding Fall Risk;Mobility Assessed & Appropriate Sign Placed;Communication Sign for Patients & Families;Bed in Low Position   IV Pole on Same Side of Bed as Bathroom --  (Saline locked) --    Bedrails --  --  Bedrails Closest to Bathroom Down   Chair/Bed Strip Alarm --  --  Patient Educated Regarding Fall Risk and Need for Bed Alarm, Understands and Continues to Refuse   Bed Alarm (Built in - for ICU ONLY) Yes - Alarm On --  --      Pt refuses bed alarm, discussed use of call light. Verbalized understanding       Problem: Venous Thromboembolism (VTW)/Deep Vein Thrombosis (DVT) Prevention:  Goal: Patient will participate in Venous Thrombosis (VTE)/Deep Vein Thrombosis (DVT)Prevention Measures  Outcome: PROGRESSING SLOWER THAN EXPECTED    02/07/17 0700   OTHER   Risk Assessment Score 4   VTE RISK High   Mechanical Prophylaxis SCDs, Sequentials (Intermittent Pneumatic Compression Devices)   Pharmacologic Prophylaxis Used LMWH: Enoxaparin(Lovenox)     Pt refused lovenox, after education pt still refused.

## 2017-02-07 NOTE — PROGRESS NOTES
Assessment performed, VSS, due medications administered per MAR, patient updated about POC and stress test in the morning, patient verbalized understanding about no caffeine as well as NPO after midnight for stress test, safety precautions in place, all lines remain patent, CLIP

## 2017-02-07 NOTE — PROGRESS NOTES
Report given to Day Shift RN Marlene, patient laying comfortably in bed and has no complaints at this time, safety precautions in place, CLIP

## 2017-02-07 NOTE — PROGRESS NOTES
Patient resting on and off in bed and denies any needs at this time, safety precautions in place, CLIP    Tele strip at 2003 shows Sinus rhythm with a HR of 75.      Measurements: .16/.10/.30    Tele Shift Summary:    Rhythm : Sinus rhythm/bradycardia  Rate : 50s to 90s (lowest 47)    Ectopy : Per CCT Yumiko, pt had occasional PVCs    Telemetry monitoring strips placed in pt chart.

## 2017-02-07 NOTE — IP AVS SNAPSHOT
2/9/2017          Lacey Her  [address] [place] [address]    Dear Lacey:    [facility] wants to ensure your discharge home is safe and you or your loved ones have had all your questions answered regarding your care after you leave the hospital.    You may receive a telephone call within two days of your discharge.  This call is to make certain you understand your discharge instructions as well as ensure we provided you with the best care possible during your stay with us.     The call will only last approximately 3-5 minutes and will be done by a nurse.    Once again, we want to ensure your discharge home is safe and that you have a clear understanding of any next steps in your care.  If you have any questions or concerns, please do not hesitate to contact us, we are here for you.  Thank you for choosing [facility] for your healthcare needs.    Sincerely,    Sharad Blanco    [facility]

## 2017-02-07 NOTE — PROGRESS NOTES
Spoke with Alicia, [facility] has no bed available at this time. Will call when one opens up.

## 2017-02-07 NOTE — DISCHARGE PLANNING
Dr. Manrique informed this SW that he has placed orders for this patient to transfer to [facility] for Cath.  SW called transfer center [id number].

## 2017-02-07 NOTE — PROGRESS NOTES
Nursing care plan includes knowledge deficit, potential for discomfort, potential for compromised cardiac output. POC includes teaching, comfort measures and reassurance, and access to code cart, cardiology stand by and availability of rapid response team. Pt verbalizes good understanding of benefits and risks of pharmacological cardiac stress test. Informed consent obtained.Lexiscan given, pt developed following symptoms: SOB increased. Reversed with Aminophylline per protocol. VS stable, symptoms resolved. To waiting room,  Fluids and/or snack given, awaiting second scan.Nursing goals met.

## 2017-02-08 PROBLEM — J40 BRONCHITIS: Status: ACTIVE | Noted: 2017-02-08

## 2017-02-08 LAB
ALBUMIN SERPL BCP-MCNC: 4.2 G/DL (ref 3.2–4.9)
ALBUMIN/GLOB SERPL: 1.3 G/DL
ALP SERPL-CCNC: 101 U/L (ref 30–99)
ALT SERPL-CCNC: 12 U/L (ref 2–50)
ANION GAP SERPL CALC-SCNC: 10 MMOL/L (ref 0–11.9)
APTT PPP: 63.6 SEC (ref 24.7–36)
APTT PPP: 78.6 SEC (ref 24.7–36)
AST SERPL-CCNC: 23 U/L (ref 12–45)
BILIRUB SERPL-MCNC: 0.7 MG/DL (ref 0.1–1.5)
BUN SERPL-MCNC: 19 MG/DL (ref 8–22)
CALCIUM SERPL-MCNC: 11.3 MG/DL (ref 8.5–10.5)
CHLORIDE SERPL-SCNC: 101 MMOL/L (ref 96–112)
CO2 SERPL-SCNC: 25 MMOL/L (ref 20–33)
CREAT SERPL-MCNC: 0.73 MG/DL (ref 0.5–1.4)
GFR SERPL CREATININE-BSD FRML MDRD: >60 ML/MIN/1.73 M 2
GLOBULIN SER CALC-MCNC: 3.2 G/DL (ref 1.9–3.5)
GLUCOSE BLD-MCNC: 119 MG/DL (ref 65–99)
GLUCOSE BLD-MCNC: 123 MG/DL (ref 65–99)
GLUCOSE BLD-MCNC: 123 MG/DL (ref 65–99)
GLUCOSE BLD-MCNC: 134 MG/DL (ref 65–99)
GLUCOSE SERPL-MCNC: 146 MG/DL (ref 65–99)
INR PPP: 1 (ref 0.87–1.13)
LV EJECT FRACT  99904: 70
LV EJECT FRACT MOD 2C 99903: 68.61
LV EJECT FRACT MOD 4C 99902: 79.56
LV EJECT FRACT MOD BP 99901: 75.66
POTASSIUM SERPL-SCNC: 3.3 MMOL/L (ref 3.6–5.5)
PROT SERPL-MCNC: 7.4 G/DL (ref 6–8.2)
PROTHROMBIN TIME: 13.5 SEC (ref 12–14.6)
SODIUM SERPL-SCNC: 136 MMOL/L (ref 135–145)

## 2017-02-08 PROCEDURE — B2181ZZ FLUOROSCOPY OF LEFT INTERNAL MAMMARY BYPASS GRAFT USING LOW OSMOLAR CONTRAST: ICD-10-PCS | Performed by: INTERNAL MEDICINE

## 2017-02-08 PROCEDURE — 360979 HCHG DIAGNOSTIC CATH

## 2017-02-08 PROCEDURE — 700111 HCHG RX REV CODE 636 W/ 250 OVERRIDE (IP): Performed by: HOSPITALIST

## 2017-02-08 PROCEDURE — A9270 NON-COVERED ITEM OR SERVICE: HCPCS | Performed by: HOSPITALIST

## 2017-02-08 PROCEDURE — 94760 N-INVAS EAR/PLS OXIMETRY 1: CPT

## 2017-02-08 PROCEDURE — 700101 HCHG RX REV CODE 250: Performed by: HOSPITALIST

## 2017-02-08 PROCEDURE — 770020 HCHG ROOM/CARE - TELE (206)

## 2017-02-08 PROCEDURE — 700117 HCHG RX CONTRAST REV CODE 255: Performed by: INTERNAL MEDICINE

## 2017-02-08 PROCEDURE — 94640 AIRWAY INHALATION TREATMENT: CPT

## 2017-02-08 PROCEDURE — 99233 SBSQ HOSP IP/OBS HIGH 50: CPT | Performed by: HOSPITALIST

## 2017-02-08 PROCEDURE — 99152 MOD SED SAME PHYS/QHP 5/>YRS: CPT

## 2017-02-08 PROCEDURE — 700102 HCHG RX REV CODE 250 W/ 637 OVERRIDE(OP): Performed by: HOSPITALIST

## 2017-02-08 PROCEDURE — 304952 HCHG R 2 PADS

## 2017-02-08 PROCEDURE — B3121ZZ FLUOROSCOPY OF LEFT SUBCLAVIAN ARTERY USING LOW OSMOLAR CONTRAST: ICD-10-PCS | Performed by: INTERNAL MEDICINE

## 2017-02-08 PROCEDURE — 36415 COLL VENOUS BLD VENIPUNCTURE: CPT

## 2017-02-08 PROCEDURE — 4A023N8 MEASUREMENT OF CARDIAC SAMPLING AND PRESSURE, BILATERAL, PERCUTANEOUS APPROACH: ICD-10-PCS | Performed by: INTERNAL MEDICINE

## 2017-02-08 PROCEDURE — 700111 HCHG RX REV CODE 636 W/ 250 OVERRIDE (IP): Performed by: INTERNAL MEDICINE

## 2017-02-08 PROCEDURE — 700101 HCHG RX REV CODE 250

## 2017-02-08 PROCEDURE — 305478 HCHG 7.5FR EDWARDS SWAN/THERMO

## 2017-02-08 PROCEDURE — 700105 HCHG RX REV CODE 258

## 2017-02-08 PROCEDURE — 85730 THROMBOPLASTIN TIME PARTIAL: CPT

## 2017-02-08 PROCEDURE — C1769 GUIDE WIRE: HCPCS

## 2017-02-08 PROCEDURE — 303418 HCHG 4FR ULTIMATE CATHETER

## 2017-02-08 PROCEDURE — 700112 HCHG RX REV CODE 229: Performed by: HOSPITALIST

## 2017-02-08 PROCEDURE — B2111ZZ FLUOROSCOPY OF MULTIPLE CORONARY ARTERIES USING LOW OSMOLAR CONTRAST: ICD-10-PCS | Performed by: INTERNAL MEDICINE

## 2017-02-08 PROCEDURE — 93461 R&L HRT ART/VENTRICLE ANGIO: CPT

## 2017-02-08 PROCEDURE — 82962 GLUCOSE BLOOD TEST: CPT

## 2017-02-08 PROCEDURE — 700111 HCHG RX REV CODE 636 W/ 250 OVERRIDE (IP)

## 2017-02-08 PROCEDURE — 85610 PROTHROMBIN TIME: CPT

## 2017-02-08 PROCEDURE — B2131ZZ FLUOROSCOPY OF MULTIPLE CORONARY ARTERY BYPASS GRAFTS USING LOW OSMOLAR CONTRAST: ICD-10-PCS | Performed by: INTERNAL MEDICINE

## 2017-02-08 PROCEDURE — 99153 MOD SED SAME PHYS/QHP EA: CPT

## 2017-02-08 PROCEDURE — C1894 INTRO/SHEATH, NON-LASER: HCPCS

## 2017-02-08 RX ORDER — HEPARIN SODIUM,PORCINE 1000/ML
VIAL (ML) INJECTION
Status: COMPLETED
Start: 2017-02-08 | End: 2017-02-08

## 2017-02-08 RX ORDER — GUAIFENESIN 600 MG/1
600 TABLET, EXTENDED RELEASE ORAL EVERY 12 HOURS
Status: DISCONTINUED | OUTPATIENT
Start: 2017-02-08 | End: 2017-02-09 | Stop reason: HOSPADM

## 2017-02-08 RX ORDER — BENZONATATE 100 MG/1
100 CAPSULE ORAL 3 TIMES DAILY PRN
Status: DISCONTINUED | OUTPATIENT
Start: 2017-02-08 | End: 2017-02-09 | Stop reason: HOSPADM

## 2017-02-08 RX ORDER — MIDAZOLAM HYDROCHLORIDE 1 MG/ML
INJECTION INTRAMUSCULAR; INTRAVENOUS
Status: COMPLETED
Start: 2017-02-08 | End: 2017-02-08

## 2017-02-08 RX ORDER — IPRATROPIUM BROMIDE AND ALBUTEROL SULFATE 2.5; .5 MG/3ML; MG/3ML
3 SOLUTION RESPIRATORY (INHALATION)
Status: DISCONTINUED | OUTPATIENT
Start: 2017-02-09 | End: 2017-02-09 | Stop reason: HOSPADM

## 2017-02-08 RX ORDER — SODIUM CHLORIDE 9 MG/ML
INJECTION, SOLUTION INTRAVENOUS
Status: COMPLETED
Start: 2017-02-08 | End: 2017-02-08

## 2017-02-08 RX ORDER — POTASSIUM CHLORIDE 20 MEQ/1
20 TABLET, EXTENDED RELEASE ORAL DAILY
Status: DISCONTINUED | OUTPATIENT
Start: 2017-02-09 | End: 2017-02-09 | Stop reason: HOSPADM

## 2017-02-08 RX ORDER — LIDOCAINE HYDROCHLORIDE 20 MG/ML
INJECTION, SOLUTION INFILTRATION; PERINEURAL
Status: COMPLETED
Start: 2017-02-08 | End: 2017-02-08

## 2017-02-08 RX ORDER — POTASSIUM CHLORIDE 7.45 MG/ML
10 INJECTION INTRAVENOUS ONCE
Status: COMPLETED | OUTPATIENT
Start: 2017-02-08 | End: 2017-02-08

## 2017-02-08 RX ORDER — VERAPAMIL HYDROCHLORIDE 2.5 MG/ML
INJECTION, SOLUTION INTRAVENOUS
Status: COMPLETED
Start: 2017-02-08 | End: 2017-02-08

## 2017-02-08 RX ORDER — FUROSEMIDE 20 MG/1
20 TABLET ORAL
Status: DISCONTINUED | OUTPATIENT
Start: 2017-02-08 | End: 2017-02-09 | Stop reason: HOSPADM

## 2017-02-08 RX ORDER — POTASSIUM CHLORIDE 20 MEQ/1
40 TABLET, EXTENDED RELEASE ORAL ONCE
Status: COMPLETED | OUTPATIENT
Start: 2017-02-08 | End: 2017-02-08

## 2017-02-08 RX ADMIN — IPRATROPIUM BROMIDE AND ALBUTEROL SULFATE 3 ML: .5; 3 SOLUTION RESPIRATORY (INHALATION) at 18:31

## 2017-02-08 RX ADMIN — AZITHROMYCIN 250 MG: 250 TABLET, FILM COATED ORAL at 20:56

## 2017-02-08 RX ADMIN — BENZONATATE 100 MG: 100 CAPSULE ORAL at 20:57

## 2017-02-08 RX ADMIN — LOSARTAN POTASSIUM 50 MG: 50 TABLET, FILM COATED ORAL at 07:58

## 2017-02-08 RX ADMIN — GUAIFENESIN AND DEXTROMETHORPHAN 10 ML: 100; 10 SYRUP ORAL at 16:44

## 2017-02-08 RX ADMIN — IPRATROPIUM BROMIDE AND ALBUTEROL SULFATE 3 ML: .5; 3 SOLUTION RESPIRATORY (INHALATION) at 07:28

## 2017-02-08 RX ADMIN — METHYLPREDNISOLONE SODIUM SUCCINATE 62.5 MG: 125 INJECTION, POWDER, FOR SOLUTION INTRAMUSCULAR; INTRAVENOUS at 17:12

## 2017-02-08 RX ADMIN — HEPARIN SODIUM 2000 UNITS: 200 INJECTION, SOLUTION INTRAVENOUS at 10:55

## 2017-02-08 RX ADMIN — ALBUTEROL SULFATE 2 PUFF: 90 AEROSOL, METERED RESPIRATORY (INHALATION) at 06:13

## 2017-02-08 RX ADMIN — GUAIFENESIN 600 MG: 600 TABLET, EXTENDED RELEASE ORAL at 20:56

## 2017-02-08 RX ADMIN — HEPARIN SODIUM: 1000 INJECTION, SOLUTION INTRAVENOUS; SUBCUTANEOUS at 10:50

## 2017-02-08 RX ADMIN — POTASSIUM CHLORIDE 40 MEQ: 1500 TABLET, EXTENDED RELEASE ORAL at 16:44

## 2017-02-08 RX ADMIN — IOHEXOL 112 ML: 350 INJECTION, SOLUTION INTRAVENOUS at 11:58

## 2017-02-08 RX ADMIN — METHYLPREDNISOLONE SODIUM SUCCINATE 62.5 MG: 125 INJECTION, POWDER, FOR SOLUTION INTRAMUSCULAR; INTRAVENOUS at 12:05

## 2017-02-08 RX ADMIN — FLUTICASONE PROPIONATE 220 MCG: 110 AEROSOL, METERED RESPIRATORY (INHALATION) at 07:58

## 2017-02-08 RX ADMIN — BACLOFEN 10 MG: 10 TABLET ORAL at 07:58

## 2017-02-08 RX ADMIN — LIDOCAINE HYDROCHLORIDE: 20 INJECTION, SOLUTION INFILTRATION; PERINEURAL at 10:51

## 2017-02-08 RX ADMIN — ATORVASTATIN CALCIUM 40 MG: 40 TABLET, FILM COATED ORAL at 20:57

## 2017-02-08 RX ADMIN — IPRATROPIUM BROMIDE AND ALBUTEROL SULFATE 3 ML: .5; 3 SOLUTION RESPIRATORY (INHALATION) at 02:35

## 2017-02-08 RX ADMIN — METHYLPREDNISOLONE SODIUM SUCCINATE 62.5 MG: 125 INJECTION, POWDER, FOR SOLUTION INTRAMUSCULAR; INTRAVENOUS at 06:13

## 2017-02-08 RX ADMIN — METOPROLOL SUCCINATE 100 MG: 50 TABLET, EXTENDED RELEASE ORAL at 07:57

## 2017-02-08 RX ADMIN — IPRATROPIUM BROMIDE AND ALBUTEROL SULFATE 3 ML: .5; 3 SOLUTION RESPIRATORY (INHALATION) at 22:22

## 2017-02-08 RX ADMIN — NITROGLYCERIN 10 ML: 20 INJECTION INTRAVENOUS at 10:51

## 2017-02-08 RX ADMIN — POTASSIUM CHLORIDE 10 MEQ: 7.46 INJECTION, SOLUTION INTRAVENOUS at 07:54

## 2017-02-08 RX ADMIN — MIDAZOLAM 2 MG: 1 INJECTION INTRAMUSCULAR; INTRAVENOUS at 10:50

## 2017-02-08 RX ADMIN — ASPIRIN 81 MG: 81 TABLET ORAL at 07:57

## 2017-02-08 RX ADMIN — SODIUM CHLORIDE 500 ML: 9 INJECTION, SOLUTION INTRAVENOUS at 08:03

## 2017-02-08 RX ADMIN — HYDROCHLOROTHIAZIDE 12.5 MG: 25 TABLET ORAL at 07:59

## 2017-02-08 RX ADMIN — ENOXAPARIN SODIUM 40 MG: 100 INJECTION SUBCUTANEOUS at 17:53

## 2017-02-08 RX ADMIN — AMLODIPINE BESYLATE 5 MG: 5 TABLET ORAL at 07:57

## 2017-02-08 RX ADMIN — IPRATROPIUM BROMIDE AND ALBUTEROL SULFATE 3 ML: .5; 3 SOLUTION RESPIRATORY (INHALATION) at 16:48

## 2017-02-08 RX ADMIN — FENTANYL CITRATE 100 MCG: 50 INJECTION, SOLUTION INTRAMUSCULAR; INTRAVENOUS at 10:50

## 2017-02-08 RX ADMIN — MIDAZOLAM 2 MG: 1 INJECTION INTRAMUSCULAR; INTRAVENOUS at 11:21

## 2017-02-08 RX ADMIN — AMLODIPINE BESYLATE 5 MG: 5 TABLET ORAL at 20:57

## 2017-02-08 RX ADMIN — FUROSEMIDE 20 MG: 20 TABLET ORAL at 17:53

## 2017-02-08 ASSESSMENT — ENCOUNTER SYMPTOMS
BRUISES/BLEEDS EASILY: 0
GASTROINTESTINAL NEGATIVE: 1
NEUROLOGICAL NEGATIVE: 1
EYES NEGATIVE: 1
COUGH: 1
SPUTUM PRODUCTION: 1
WHEEZING: 1
SHORTNESS OF BREATH: 1
BACK PAIN: 1
CARDIOVASCULAR NEGATIVE: 1
PSYCHIATRIC NEGATIVE: 1

## 2017-02-08 ASSESSMENT — LIFESTYLE VARIABLES
EVER_SMOKED: YES
PACK_YEARS: 50
PACK_YEARS: 50
EVER_SMOKED: YES

## 2017-02-08 ASSESSMENT — PAIN SCALES - GENERAL
PAINLEVEL_OUTOF10: 0

## 2017-02-08 ASSESSMENT — COPD QUESTIONNAIRES
DURING THE PAST 4 WEEKS HOW MUCH DID YOU FEEL SHORT OF BREATH: SOME OF THE TIME
DO YOU EVER COUGH UP ANY MUCUS OR PHLEGM?: YES, A FEW DAYS A WEEK OR MONTH
COPD SCREENING SCORE: 6
HAVE YOU SMOKED AT LEAST 100 CIGARETTES IN YOUR ENTIRE LIFE: YES
DO YOU EVER COUGH UP ANY MUCUS OR PHLEGM?: YES, A FEW DAYS A WEEK OR MONTH
DURING THE PAST 4 WEEKS HOW MUCH DID YOU FEEL SHORT OF BREATH: SOME OF THE TIME
HAVE YOU SMOKED AT LEAST 100 CIGARETTES IN YOUR ENTIRE LIFE: YES
COPD SCREENING SCORE: 6

## 2017-02-08 NOTE — CARE PLAN
Problem: Knowledge Deficit  Goal: Knowledge of disease process/condition, treatment plan, diagnostic tests, and medications will improve  Outcome: PROGRESSING AS EXPECTED  POC discussed with pt and MD at bedside. Plan for cardiac cath today. Currently NPO. RT on board. IV abx. All questions answered. Verbalized understanding.     Problem: Respiratory:  Goal: Respiratory status will improve  Outcome: PROGRESSING AS EXPECTED  RT on board. Breathing treatments, inhalers, monitor oxygen.

## 2017-02-08 NOTE — PROGRESS NOTES
2 RN skin assessment complete: L elbow red, but blanching. R leg dry, red spot (blanching). Otherwise, skin intact.

## 2017-02-08 NOTE — RESPIRATORY CARE
COPD EDUCATION by COPD CLINICAL EDUCATOR  2/8/2017 at 2:14 PM by Aurea Hutson     Patient interviewed by COPD education team. Patient refused COPD program at this time.

## 2017-02-08 NOTE — PROGRESS NOTES
Med rec completed by [employer] at [facility] on 02/06 at 1341.    Med rec complete per [employer] [phone]  Allergies reviewed  Per patient she does know any of her medications, called insurance for an updates list

## 2017-02-08 NOTE — PROGRESS NOTES
Pt arrive from  via gurney. Patient care assumed. Medrec complete, admissions called and wristband placed. Consent to treat signed. Patient assessed. Pt on 5L NC with O2 sats in 90s. Pt SR. No distress present; no pain. Call light, phone, and bedside table within reach. White board updated and plan of care discussed with patient. Bed alarm and strip alarm set and in place. No concerns present at this time. Will continue to monitor.

## 2017-02-08 NOTE — PROGRESS NOTES
TC SILVIA received call from JANEEN Vargas at [facility] to initiate transfer for cath.  CM contacted the cath lab to determine when pt is scheduled, per Kelsey, pt is not currently on the schedule.  CM observed orders entered/held by transferring physician Dr MARIA EUGENIA Nugent; orders will need to be released upon arrival to unit.       CM updated JANEEN Vargas with bed assignment.

## 2017-02-08 NOTE — PROGRESS NOTES
New IV started, Cobra signed. Heparin drip started. Pt aware of pending transfer. Denies needs at this time. Belonging at beside ready for [facility].

## 2017-02-08 NOTE — PROGRESS NOTES
Cardiology Progress Note               Author: Harvey Olivia Date & Time created: 2017  12:22 PM     Interval History:  No angina since admission.  Just returned from cath lab. No written report yet, but verbal report is of closed graft and anticipated medical Rx.  She quit smoking last November.  Complains of dyspnea at home.    Review of Systems   Respiratory: Positive for shortness of breath.    Cardiovascular: Negative for chest pain.   Endo/Heme/Allergies: Does not bruise/bleed easily.       Physical Exam   Constitutional: She is oriented to person, place, and time. No distress.   Eyes: No scleral icterus.   Neck: No JVD present.   Cardiovascular: Normal rate and regular rhythm.    No murmur heard.  No hematoma at arterial puncture site.   Pulmonary/Chest: No respiratory distress. She has wheezes.   Musculoskeletal: She exhibits no edema.   Neurological: She is alert and oriented to person, place, and time.   Skin: Skin is warm and dry.       Hemodynamics:  Temp (24hrs), Av.7 °C (98 °F), Min:36.2 °C (97.2 °F), Max:37.1 °C (98.8 °F)  Temperature: 36.5 °C (97.7 °F)  Pulse  Av.6  Min: 71  Max: 101   Blood Pressure : 118/69 mmHg     Respiratory:    Respiration: 18, Pulse Oximetry: 90 %, O2 Daily Delivery Respiratory : Silicone Nasal Cannula     Given By:: Mouthpiece, Work Of Breathing / Effort: Mild  RUL Breath Sounds: Expiratory Wheezes, RML Breath Sounds: Diminished, RLL Breath Sounds: Diminished, YUMIKO Breath Sounds: Expiratory Wheezes, LLL Breath Sounds: Diminished  Fluids:     Weight: 77.7 kg (171 lb 4.8 oz)  GI/Nutrition:  Orders Placed This Encounter   Procedures   • Diet Order     Standing Status: Standing      Number of Occurrences: 1      Standing Expiration Date:      Order Specific Question:  Diet:     Answer:  Cardiac [6]     Order Specific Question:  Diet:     Answer:  Diabetic [3]     Lab Results:  Recent Labs      17   1239  17   0214  17   2331   WBC  6.4  6.9  14.6*    RBC  5.72*  5.19  5.59*   HEMOGLOBIN  15.8  14.6  15.6   HEMATOCRIT  48.6*  44.3  47.0   MCV  85.0  85.4  84.1   MCH  27.6  28.1  27.9   MCHC  32.5*  33.0*  33.2*   RDW  47.5  47.5  47.9   PLATELETCT  230  218  271   MPV  10.0  9.9  9.9     Recent Labs      02/06/17   1239  02/07/17   0214  02/07/17   2331   SODIUM  135  136  136   POTASSIUM  3.6  3.3*  3.3*   CHLORIDE  101  104  101   CO2  25  24  25   GLUCOSE  111*  140*  146*   BUN  16  20  19   CREATININE  0.72  0.69  0.73   CALCIUM  10.4*  10.5*  11.3*     Recent Labs      02/06/17   1239  02/07/17   1653  02/07/17   2331  02/08/17   0426   APTT  29.2  25.3  78.6*  63.6*   INR  1.03   --    --   1.00     Recent Labs      02/06/17   1239   BNPBTYPENAT  99     Recent Labs      02/06/17   1239  02/06/17   2039  02/07/17   0214   TROPONINI  0.05*  0.02  0.03   BNPBTYPENAT  99   --    --      Recent Labs      02/07/17   0214   TRIGLYCERIDE  64   HDL  35*   LDL  60         Medical Decision Making, by Problem:  There are no hospital problems to display for this patient.      Plan:  Medical Rx.  Awaiting cath report.Smoking cessation discussed.  Check room air Oximetry.  No bleeding from arterial puncture site.  LDL at target at 60 on current meds.    Core Measures

## 2017-02-08 NOTE — CARE PLAN
Problem: Safety  Goal: Will remain free from injury  Outcome: PROGRESSING AS EXPECTED  Pt remains free from falls or injuries. Bed alarm and strip alarm set and in place. Pt calls for assistance appropriately.     Problem: Venous Thromboembolism (VTW)/Deep Vein Thrombosis (DVT) Prevention:  Goal: Patient will participate in Venous Thrombosis (VTE)/Deep Vein Thrombosis (DVT)Prevention Measures  Outcome: PROGRESSING AS EXPECTED  Patient receiving heparin drip. No s/sx of DVT.

## 2017-02-08 NOTE — PROCEDURES
DATE OF SERVICE:  02/08/2017    PROCEDURE:  Cardiac catheterization and Peripheral vascular Angiography  A.  Left heart catheterization.  B.  Left ventriculography.  C.  Selective coronary angiography.  D.  Angiography of left internal mammary artery bypass graft.  F.  Saphenous vein bypass graft angiography.  G.  Left subclavian artery angiography.  H.  Right heart catheterization.  I.  Right femoral vein approach.  J.  Right femoral artery approach.    PHYSICIAN:  Tariq Flor MD    REFERRING PHYSICIAN:  Jana Humphrey MD    COMPLICATIONS:  None.    MEDICATIONS:  1.  Versed 4 mg IV.  2.  Fentanyl 100 mcg IV.  3.  Lidocaine 2% subcutaneous.    INDICATIONS: The patient is a 71-year-old female with a history of coronary artery disease,   coronary bypass surgery x4 in 2004 at [facility] in [place], [place], severe   tobacco-related COPD, hypertension, diabetes mellitus and hyperlipidemia who was admitted   to [facility] on 2/6/2017 for shortness of breath and cough initially felt to be   COPD exacerbation. Echocardiogram on 02/06/2017 showed normal left ventricular function,   moderate concentric left ventricular hypertrophy, ejection fraction of 70%, and mild pulmonary   hypertension.  A myocardial perfusion scan on 02/07/2017 shows a large anterior defect   consistent with scar with superimposed ischemia with an ejection fraction of 69% with hypokinesis   of the distal anterior wall, anterior septum and apex.    Patient is therefore referred to undergo a diagnostic right and left heart cardiac catheterization.    DESCRIPTION OF PROCEDURE:  After an informed consent was obtained, the patient   was brought to the cardiac catheterization laboratory.    The patient was prepped and draped in the usual manner.    Using a modified Seldinger technique, 4 and 8-Nepalese introducer sheaths were   inserted in the right femoral artery and vein respectively.      Next, a 4-Nepalese JL 4.0 left  coronary catheter was inserted in the ostium of the left coronary   artery and left coronary angiograms were obtained in various projections.    Next, a 4-[place]er 3D right coronary catheter was inserted in the ostium of the   right coronary artery and right coronary angiograms were obtained in various   projections.    Next, using the same catheter, a saphenous vein graft to an unknown vessel was   cannulated and angiograms were obtained.    Next, using the same catheter, the saphenous vein graft to the obtuse marginal   branch was cannulated and angiograms were obtained.    Using the same catheter, the left subclavian artery was cannulated and   angiograms were obtained.    Using the same catheter a LIMA bypass graft angiogram was obtained    Next, a 4-[place]er multipurpose A2 catheter was inserted in the saphenous vein   graft to the right coronary and angiograms were obtained in various projections.    Next, a 4-[place]er pigtail catheter was then inserted into the ascending aorta,   but could not be advanced across the aortic valve.    Next, a 4-[place]er JR 4.0 right coronary catheter was inserted in the ascending   aorta and a 0.035 fixed core wire was used to cross the aortic valve.  The JR4   was advanced in the left ventricle.  Using a 0.035 exchange length J tipped   wire, the JR4 catheter was exchanged for a 4-[place]er pigtail catheter.    Next, a single plane ARMENDARIZ left ventricular angiogram was performed.  Pre and   post angiogram LVEDP, LV and aortic pressures were obtained.    Next, a 7.5-[place]er balloon tip Casco-Jennifer catheter was inserted into the   pulmonary artery and wedge position.  Right heart pressures and thermodilution   cardiac outputs were obtained.    At the end the procedure, catheters were removed.  Hemostasis was achieved   with manual compression.  Patient tolerated the procedure well.    FINDINGS:  HEMODYNAMICS:  Right heart pressures:    A. Mean right atrial pressure 5.    B Right ventricular  pressure 33/9.    C Pulmonary artery pressure 30/17, mean of 22.    D. Pulmonary capillary wedge pressure 7 mmHg.    Cardiac output thermodilution method 7.8 liters per minute, cardiac index 2.6   liters per meter squared.    Pulmonary vascular resistance was 2.0 Wood units.    LEFT VENTRICULOGRAPHY:  Left ventricular chamber size is normal with severe   hypokinesis and akinesis of the proximal and mid anterior wall with good contractility of   the inferior wall, distal anterior anterior wall and apex.  Calculated ejection fraction 56%.    No mitral regurgitation or LV thrombus present.    CORONARY ARTERIOGRAPHY:  1.  Left main artery:  Left main vessel is angiographically patent and   trifurcates into a left anterior descending artery, ramus intermedius vessel,   and circumflex artery.  2.  Left anterior descending artery:  The LAD is 100% occluded shortly after   its origin.  3.  Ramus intermedius:  The ramus branches is a small caliber and angiographically   normal.  4.  Circumflex artery:  The circumflex is a moderately large caliber vessel,   gives rise to a small caliber marginal branch and a large caliber second marginal   branch.  The circumflex artery is widely patent.  There is faint retrograde collateral   circulation filling of the distal left anterior descending artery.  5.  Right coronary artery:  The RCA gives rise to 3 tiny caliber RV branches   and a conus branch.  The proximal and mid RCA has severe diffuse stenotic   disease.  Mid RCA is 100% occluded after the 3rd RV branch.  The RV branches and   conus branch provides corkscrew collateral circulation to the distal left   anterior descending artery.  The LAD appears to be a small caliber vessel.    LIMA BYPASS GRAFT ANGIOGRAPHY: The left internal mammary artery graft could   not be selectively cannulated due to a significant stenosis of the proximal left subclavian   artery. However the LIMA graft fills and opacifies sufficiently revealing a tiny  caliber   vessel which is essentially is nonfunctional with a distal occlusion terminating before the   left anterior descending artery    SAPHENOUS VEIN BYPASS GRAFT ANGIOGRAPHY:  1.  Saphenous vein bypass graft to the right coronary artery demonstrates an irregular   50% stenosis of the midportion of the vein graft The distal anastomosis is patent.  There   is antegrade filling of the posterior descending artery and a small posterolateral system.  2.  Saphenous vein graft to an unknown vessel is 100% occluded at the aorta   ostium.  3.  Saphenous vein bypass grafts to the circumflex marginal vessel is widely   patent.  The distal anastomosis is patent.  There is antegrade filling of the   circumflex marginal vessel which provides retrograde collateral circulation to   what appears to be another marginal branch and probably a small caliber   diagonal branch.    PERIPHERAL ANGIOGRAPHY: Angiography of the left subclavian artery.  The left   subclavian artery has significant estimated 70% proximal stenosis.    CONCLUSIONS:  1.  Ejection fraction 56%.  Hypokinesis/akinesis of the proximal and mid anterior wall.  2.  Three-vessel native coronary artery disease.  3.  Occluded bypass grafts:  LIMA to LAD and SVG to an unknown vessel.  4.  Patent saphenous vein bypass graft to the circumflex marginal branch and   the distal right coronary artery.  5.  Subclavian artery proximal stenosis 70%.  6.  Retrograde collateral circulation to the distal left anterior descending   artery from the distal circumflex system and right ventricular branches of the right   coronary artery.       ____________________________________     MD KOFI WELLS / MALIA    DD:  02/08/2017 12:37:50  DT:  02/08/2017 14:01:18    D#:  [id number]  Job#:  [id number]

## 2017-02-08 NOTE — H&P
CHIEF COMPLAINT:  Shortness of breath and cough.    HISTORY OF PRESENT ILLNESS:  This is a 71-year-old female with a 3-day history   of shortness of breath, was worse with activity and improved with rest.  She   has a known history of heart disease, status post CABG and underwent a stress   test today that showed a large area of infarction in the anterior area with   some marked amount of superimposed ischemia.  She is to be transferred from   [facility] to [facility] for consideration   of cardiac catheterization at recommendation of Dr. Jonn Humphrey, a   cardiology who I spoke with today regarding this.  She has never been on   previous home oxygen.  At the time of my evaluation, she is chest pain free   and somewhat fearful of her new diagnosis, but otherwise feeling well.    REVIEW OF SYSTEMS:  Otherwise negative.    PAST MEDICAL HISTORY:  1.  COPD.  2.  Coronary artery disease.  3.  Hypertension.  4.  Hyperlipidemia.  5.  Diabetes.  6.  Chronic back pain.    PAST SURGICAL HISTORY:  CABG x4.    ALLERGIES:  No known drug allergies.    OUTPATIENT MEDICATIONS:  1.  Albuterol metered dose inhaler.  2.  Toprol- mg a day.  3.  Lipitor 40 mg a day.  4.  Norvasc 5 mg a day.  5.  Hyzaar 50/12.5 one tab a day.  6.  Glucophage 500 mg 2 times a day.  7.  Baclofen 10 mg every day.    SOCIAL HISTORY:  Previously a pack a day smoker, quit last year.  Denies drugs   or alcohol.    FAMILY HISTORY:  No coronary or cerebrovascular disease run in family;   however, states that her mother had congestive heart failure.    PHYSICAL EXAMINATION:  GENERAL:  Somewhat tearful female in mild amount of distress.  VITAL SIGNS:  Temperature is 36.6, heart rate is 77, respirations are 17,   pulse ox 93% on 3 liters nasal cannula, blood pressure 118/63.  LUNGS:  Exhibit decreased breath sounds throughout all lung fields.  HEART:  Regular rate and rhythm without murmur.  ABDOMEN:  Benign.  There is a  midline sternotomy scar that is well healed and   old.  Bowel sounds are normal and active.  There is no organomegaly.  JOINTS:  Joints show mild osteoarthritic changes.  SKIN:  Intact without lesion or rash.  EXTREMITIES:  Show trace bilateral pretibial edema.  HEENT:  Oropharynx is clear.  Extraocular movements are intact.  NEUROLOGIC:  She is otherwise alert and oriented and generally nonfocal.    RESULTS REVIEW:  CBC is unremarkable.  Metabolic panel; potassium is 3.3,   glucose 140, calcium is 10.5.  LDL was 60.  Troponin initially was 0.05, it is   now 0.02 and 0.03.  INR is 1.03.  CT scan of the chest, high resolution lung   shows no evidence for interstitial lung disease, shows a left lower lobe   peripheral calcification suggestive of scarring versus malignancy.  There is   no evidence for bullous disease.  Echocardiogram shows an ejection fraction of   70%, moderate concentric left ventricular hypertrophy.    ASSESSMENT:  1.  Non-ST elevation myocardial infarction.  2.  Mild hypercalcemia.  3.  Query possible lung mass left lower lobe.  4.  Chronic obstructive pulmonary disease exacerbation.  5.  Acute hypoxemic respiratory failure.  6.  Type 2 diabetes with vascular complications.  7.  Hyperlipidemia.    PLAN:  The patient will be transferred to [facility].  She   will be placed onto a heparin drip.  She is not needing nitroglycerin at this   point given she has no active chest pain.  Cardiology is where the patient   will be contacted upon arrival, therefore consideration of a catheterization   at their suggestion.  She will have her calcium trended and further workup for   this likely is needed in the near future after her cardiac issues are   resolved.  She is on an aspirin, a statin and beta blocker.  She likely needs   to be on an ACE inhibitor instead of her Norvasc depending on her clinical   course.  Medical decision making in this patient is complex.        ____________________________________     MD DIANA NGUYEN    DD:  02/07/2017 16:05:18  DT:  02/07/2017 18:57:30    D#:  [id number]  Job#:  [id number]

## 2017-02-08 NOTE — CARE PLAN
Problem: Acute Care of the Cardiac Cath Patient  Goal: Pre Procedure Optimal Outcome for the Cardiac Cath Patient  Outcome: PROGRESSING AS EXPECTED  Pre procedure education given to pt. Pt has had this procedure before so is familiar with process. All questions answered. Verbalized understanding.   Intervention: Consent for Procedure  Consent in the chart   Intervention: Aspirin/Plavix pre cath and every day post procedure per orders  Aspirin given per MAR   Intervention: Statins per order  Statin per MAR   Intervention: NPO prior to procedure  NPO since 2/8/17 at 0001

## 2017-02-08 NOTE — CONSULTS
Dict [id number]    72yo, prior CABG (she says no AVR) - now with SOB      Trops indeterminant  MPI with large anterior scar and sm-moderate ischemia    -hep gtt  -cath in am

## 2017-02-08 NOTE — CONSULTS
DATE OF SERVICE:  02/07/2017    PERSON ASKING FOR THE CONSULTATION:  Dr. Asael Nugent.    INDICATION:  Abnormal nuclear perfusion imaging study.    HISTORY OF PRESENT ILLNESS:  The patient is a 71-year-old woman who has lived   for many years in [facility].  She moved to the [facility] about 4 years   ago and admits she has not been followed closely with primary care doctors or   a cardiologist.  She had open heart bypass surgery.  She says because she had   chest discomfort several years ago in [facility].  She does not remember   the date, nor does she know what vessels are bypassed.  She thinks she might   have had a heart attack, but cannot remember.  She was never told she had   heart failure.    Interestingly enough to 3 months ago, her sister was diagnosed with congestive   heart failure and they both quit smoking.  Yesterday afternoon, she came with   cough and breathlessness.  She denied of having fevers or chills or   productive sputum or bleeding.  She has not been dizzy or had fainting and had   been coughing so hard she had thrown up.  She went to the emergency room   after lunch.  In the emergency room, she was hypotensive, but not complaining   of chest discomfort or other symptoms.  In fact, she has had no chest   discomfort at all in the past month or even years.  She does not know what her   medications are, but says she has been taking them as usual.    She underwent routine nuclear perfusion imaging as well as treatment for COPD   exacerbation.  She states that her breathing has gotten better with oxygen.    She does not wear oxygen at home.  She does not know if she snores.    REVIEW OF SYSTEMS:  All other review of systems is negative.    PAST MEDICAL HISTORY:  1.  CABG, remote, details are unknown.  2.  Hypertension.  3.  Diabetes, on oral medications.  4.  Hyperlipidemia.    HOME MEDICATIONS:  Amlodipine 5 mg a day, atorvastatin 40 mg a day, losartan   HCTZ 50/12.5 a day,  metformin 500 a day, and metoprolol 100 a day.    SOCIAL HISTORY AND FAMILY HISTORY:  Her sister has congestive heart failure.    There is no history of premature coronary disease or sudden cardiac death.    She is an ex-smoker and has more than 60 pack years.  She does not drink   alcohol or use illicit drugs.    PHYSICAL EXAMINATION:  VITAL SIGNS:  Blood pressure currently 145/83, heart rate is in the 70s, and   92% on 4 L.  GENERAL:  Anxious and plethoric appearing woman, looking older than her stated   age, moderately anxious, in no acute distress, alert and oriented x3.  No   cranial nerve deficits are appreciated.  She has no tremor or nystagmus.  NECK:  Carotid upstrokes are diminished without audible bruits.  Her jugular   venous pulse is obvious 2 cm above the sternum at 90 degrees.  HEART:  Precordium is nontender without heave.  There are no audible murmurs   or diastolic sounds.  LUNGS:  Breath sounds have coarse crackles bilaterally.  No diminished sounds.  ABDOMEN:  Soft.  EXTREMITIES:  Cool and dry.  Dorsalis pedis pulses are 1-2 bilaterally.  There   were no stasis changes or edema appreciated.    RADIOGRAPHIC DATA:  A 12-lead ECG is not available from the emergency room   yesterday, but looking at the tracings of her nuclear perfusion imaging study   in conjunction with her images of her nuclear stress test:  Her EKG shows an   incomplete left bundle-branch block with normal axis.  The rhythm is sinus.    Her nuclear imaging study shows a moderate-to-large in size, severe in   intensity, predominantly fixed anterior perfusion defect.  There is a   small-to-moderate amount of ischemia in the anterior wall.  This also involves   the apex.  Calculated ejection fraction is 69%.    I also reviewed the images from her echo:  I do not appreciate aortic valve   replacement that was commented on, she has an ejection fraction of 70%, no   significant abnormalities in her valve.  Pulmonary pressures are not  well   estimated but at least 30 mmHg.  I also looked at the images of a CT of her   chest, some biapical pleural scarring, but no evidence of significant   intrinsic lung disease.  Troponin I is 0.05, 0.02, 0.03, brain natriuretic   peptide is 100, creatinine is 0.7, glucose 140, potassium 3.3, hemoglobin is   14.6, white count is normal with no left shift.    ASSESSMENT AND RECOMMENDATIONS:  A 71-year-old woman who comes in with   breathlessness.  Her BMP is not highly suggestive of volume overload.  Kidney   function is normal and her troponins are not suggestive and high degree of   acute coronary syndrome.  However, I do admit that her defect is large and   with her symptoms, I am concerned about the possibility of missing some   reasonable amount of jeopardized viable myocardium.  1.  We will proceed with diagnostic coronary angiogram to evaluate her bypass   graft and native coronary arteries.  If there is a significant culprit it   would be reasonable to pursue invasive an approach for revascularization.  She   is aware of the risks and benefits and agrees to proceed.  2.  If this is of low yield, we will suggest further diagnostics, particularly   pulmonary function.    Thank you kindly, we will follow her with you.  We discussed this at the   bedside for more than 35 minutes.       ____________________________________     MD CANDICE NIELSEN / MALIA    DD:  02/07/2017 23:36:26  DT:  02/08/2017 01:42:43    D#:  [id number]  Job#:  [id number]

## 2017-02-08 NOTE — PROGRESS NOTES
Seen and examined. Crackles noted. Will give lasix 20 mg IV x 1. For angiogram in the morning per cardiology.

## 2017-02-08 NOTE — DISCHARGE PLANNING
JANEEN received bed assignement for patinets transfer [facility].  JANEEN completed [facility] form and faxed paper work to [facility],  JANEEN received call from [facility] with [facility] and arranged for this patient to be transferred at 7 pm.  COBRA completed and given to floor RN.  JANEEN also called transfer center and informed them of transfer time.

## 2017-02-09 VITALS
SYSTOLIC BLOOD PRESSURE: 138 MMHG | TEMPERATURE: 97 F | OXYGEN SATURATION: 91 % | HEART RATE: 64 BPM | RESPIRATION RATE: 18 BRPM | BODY MASS INDEX: 29.24 KG/M2 | HEIGHT: 64 IN | WEIGHT: 171.3 LBS | DIASTOLIC BLOOD PRESSURE: 69 MMHG

## 2017-02-09 LAB
ALBUMIN SERPL BCP-MCNC: 3.4 G/DL (ref 3.2–4.9)
ALBUMIN/GLOB SERPL: 1.3 G/DL
ALP SERPL-CCNC: 77 U/L (ref 30–99)
ALT SERPL-CCNC: 12 U/L (ref 2–50)
ANION GAP SERPL CALC-SCNC: 6 MMOL/L (ref 0–11.9)
AST SERPL-CCNC: 20 U/L (ref 12–45)
BASOPHILS # BLD AUTO: 0.3 % (ref 0–1.8)
BASOPHILS # BLD: 0.03 K/UL (ref 0–0.12)
BILIRUB SERPL-MCNC: 0.6 MG/DL (ref 0.1–1.5)
BNP SERPL-MCNC: 176 PG/ML (ref 0–100)
BUN SERPL-MCNC: 32 MG/DL (ref 8–22)
CALCIUM SERPL-MCNC: 10.5 MG/DL (ref 8.5–10.5)
CHLORIDE SERPL-SCNC: 103 MMOL/L (ref 96–112)
CO2 SERPL-SCNC: 26 MMOL/L (ref 20–33)
CREAT SERPL-MCNC: 0.74 MG/DL (ref 0.5–1.4)
EOSINOPHIL # BLD AUTO: 0 K/UL (ref 0–0.51)
EOSINOPHIL NFR BLD: 0 % (ref 0–6.9)
ERYTHROCYTE [DISTWIDTH] IN BLOOD BY AUTOMATED COUNT: 49.1 FL (ref 35.9–50)
GFR SERPL CREATININE-BSD FRML MDRD: >60 ML/MIN/1.73 M 2
GLOBULIN SER CALC-MCNC: 2.6 G/DL (ref 1.9–3.5)
GLUCOSE BLD-MCNC: 122 MG/DL (ref 65–99)
GLUCOSE BLD-MCNC: 123 MG/DL (ref 65–99)
GLUCOSE SERPL-MCNC: 126 MG/DL (ref 65–99)
HCT VFR BLD AUTO: 42.5 % (ref 37–47)
HGB BLD-MCNC: 14 G/DL (ref 12–16)
IMM GRANULOCYTES # BLD AUTO: 0.08 K/UL (ref 0–0.11)
IMM GRANULOCYTES NFR BLD AUTO: 0.8 % (ref 0–0.9)
LYMPHOCYTES # BLD AUTO: 0.81 K/UL (ref 1–4.8)
LYMPHOCYTES NFR BLD: 8.1 % (ref 22–41)
MAGNESIUM SERPL-MCNC: 2.1 MG/DL (ref 1.5–2.5)
MCH RBC QN AUTO: 28.1 PG (ref 27–33)
MCHC RBC AUTO-ENTMCNC: 32.9 G/DL (ref 33.6–35)
MCV RBC AUTO: 85.3 FL (ref 81.4–97.8)
MONOCYTES # BLD AUTO: 0.51 K/UL (ref 0–0.85)
MONOCYTES NFR BLD AUTO: 5.1 % (ref 0–13.4)
NEUTROPHILS # BLD AUTO: 8.61 K/UL (ref 2–7.15)
NEUTROPHILS NFR BLD: 85.7 % (ref 44–72)
NRBC # BLD AUTO: 0 K/UL
NRBC BLD AUTO-RTO: 0 /100 WBC
PHOSPHATE SERPL-MCNC: 2.1 MG/DL (ref 2.5–4.5)
PLATELET # BLD AUTO: 243 K/UL (ref 164–446)
PMV BLD AUTO: 9.9 FL (ref 9–12.9)
POTASSIUM SERPL-SCNC: 3.6 MMOL/L (ref 3.6–5.5)
PROT SERPL-MCNC: 6 G/DL (ref 6–8.2)
RBC # BLD AUTO: 4.98 M/UL (ref 4.2–5.4)
SODIUM SERPL-SCNC: 135 MMOL/L (ref 135–145)
WBC # BLD AUTO: 10 K/UL (ref 4.8–10.8)

## 2017-02-09 PROCEDURE — 36415 COLL VENOUS BLD VENIPUNCTURE: CPT

## 2017-02-09 PROCEDURE — 83735 ASSAY OF MAGNESIUM: CPT

## 2017-02-09 PROCEDURE — 94640 AIRWAY INHALATION TREATMENT: CPT

## 2017-02-09 PROCEDURE — 82962 GLUCOSE BLOOD TEST: CPT | Mod: 91

## 2017-02-09 PROCEDURE — 83880 ASSAY OF NATRIURETIC PEPTIDE: CPT

## 2017-02-09 PROCEDURE — 80053 COMPREHEN METABOLIC PANEL: CPT

## 2017-02-09 PROCEDURE — 700101 HCHG RX REV CODE 250: Performed by: HOSPITALIST

## 2017-02-09 PROCEDURE — 99239 HOSP IP/OBS DSCHRG MGMT >30: CPT | Performed by: HOSPITALIST

## 2017-02-09 PROCEDURE — A9270 NON-COVERED ITEM OR SERVICE: HCPCS | Performed by: HOSPITALIST

## 2017-02-09 PROCEDURE — 94760 N-INVAS EAR/PLS OXIMETRY 1: CPT

## 2017-02-09 PROCEDURE — A9270 NON-COVERED ITEM OR SERVICE: HCPCS | Performed by: NURSE PRACTITIONER

## 2017-02-09 PROCEDURE — 700102 HCHG RX REV CODE 250 W/ 637 OVERRIDE(OP): Performed by: NURSE PRACTITIONER

## 2017-02-09 PROCEDURE — 700102 HCHG RX REV CODE 250 W/ 637 OVERRIDE(OP): Performed by: HOSPITALIST

## 2017-02-09 PROCEDURE — 700112 HCHG RX REV CODE 229: Performed by: HOSPITALIST

## 2017-02-09 PROCEDURE — 700111 HCHG RX REV CODE 636 W/ 250 OVERRIDE (IP): Performed by: HOSPITALIST

## 2017-02-09 PROCEDURE — 85025 COMPLETE CBC W/AUTO DIFF WBC: CPT

## 2017-02-09 PROCEDURE — 84100 ASSAY OF PHOSPHORUS: CPT

## 2017-02-09 RX ORDER — GUAIFENESIN/DEXTROMETHORPHAN 100-10MG/5
10 SYRUP ORAL EVERY 6 HOURS PRN
Qty: 560 ML | Refills: 3 | Status: SHIPPED | OUTPATIENT
Start: 2017-02-09 | End: 2019-11-12

## 2017-02-09 RX ORDER — BENZONATATE 100 MG/1
100 CAPSULE ORAL 3 TIMES DAILY PRN
Qty: 60 CAP | Refills: 0 | Status: SHIPPED | OUTPATIENT
Start: 2017-02-09 | End: 2019-11-12

## 2017-02-09 RX ORDER — HYDROCHLOROTHIAZIDE 25 MG/1
12.5 TABLET ORAL
Status: DISCONTINUED | OUTPATIENT
Start: 2017-02-09 | End: 2017-02-09 | Stop reason: HOSPADM

## 2017-02-09 RX ORDER — METHYLPREDNISOLONE 4 MG/1
TABLET ORAL
Qty: 1 KIT | Refills: 0 | Status: SHIPPED | OUTPATIENT
Start: 2017-02-09 | End: 2019-11-12

## 2017-02-09 RX ORDER — FUROSEMIDE 20 MG/1
20 TABLET ORAL DAILY
Qty: 60 TAB | Refills: 2 | Status: SHIPPED | OUTPATIENT
Start: 2017-02-09 | End: 2019-11-12

## 2017-02-09 RX ORDER — FLUTICASONE PROPIONATE 110 UG/1
2 AEROSOL, METERED RESPIRATORY (INHALATION) EVERY 12 HOURS
Qty: 1 INHALER | Refills: 3 | Status: SHIPPED | OUTPATIENT
Start: 2017-02-09 | End: 2019-11-12

## 2017-02-09 RX ORDER — ASPIRIN 81 MG/1
81 TABLET ORAL DAILY
Qty: 100 TAB | Refills: 2 | Status: SHIPPED | OUTPATIENT
Start: 2017-02-09 | End: 2020-01-10

## 2017-02-09 RX ORDER — POTASSIUM CHLORIDE 20 MEQ/1
20 TABLET, EXTENDED RELEASE ORAL DAILY
Qty: 60 TAB | Refills: 2 | Status: SHIPPED | OUTPATIENT
Start: 2017-02-09 | End: 2020-01-10

## 2017-02-09 RX ORDER — AZITHROMYCIN 250 MG/1
TABLET, FILM COATED ORAL
Qty: 4 TAB | Refills: 0 | Status: SHIPPED | OUTPATIENT
Start: 2017-02-09 | End: 2019-11-12

## 2017-02-09 RX ADMIN — ASPIRIN 81 MG: 81 TABLET ORAL at 08:30

## 2017-02-09 RX ADMIN — IPRATROPIUM BROMIDE AND ALBUTEROL SULFATE 3 ML: .5; 3 SOLUTION RESPIRATORY (INHALATION) at 08:04

## 2017-02-09 RX ADMIN — GUAIFENESIN 600 MG: 600 TABLET, EXTENDED RELEASE ORAL at 08:30

## 2017-02-09 RX ADMIN — AMLODIPINE BESYLATE 5 MG: 5 TABLET ORAL at 08:30

## 2017-02-09 RX ADMIN — IPRATROPIUM BROMIDE AND ALBUTEROL SULFATE 3 ML: .5; 3 SOLUTION RESPIRATORY (INHALATION) at 11:49

## 2017-02-09 RX ADMIN — DOCUSATE SODIUM 100 MG: 100 CAPSULE ORAL at 08:30

## 2017-02-09 RX ADMIN — FLUTICASONE PROPIONATE 220 MCG: 110 AEROSOL, METERED RESPIRATORY (INHALATION) at 08:05

## 2017-02-09 RX ADMIN — FUROSEMIDE 20 MG: 20 TABLET ORAL at 08:30

## 2017-02-09 RX ADMIN — METOPROLOL SUCCINATE 100 MG: 50 TABLET, EXTENDED RELEASE ORAL at 08:31

## 2017-02-09 RX ADMIN — POTASSIUM CHLORIDE 20 MEQ: 1500 TABLET, EXTENDED RELEASE ORAL at 08:30

## 2017-02-09 RX ADMIN — BACLOFEN 10 MG: 10 TABLET ORAL at 11:37

## 2017-02-09 RX ADMIN — HYDROCHLOROTHIAZIDE 12.5 MG: 25 TABLET ORAL at 08:30

## 2017-02-09 RX ADMIN — METHYLPREDNISOLONE SODIUM SUCCINATE 62.5 MG: 125 INJECTION, POWDER, FOR SOLUTION INTRAMUSCULAR; INTRAVENOUS at 06:22

## 2017-02-09 RX ADMIN — LOSARTAN POTASSIUM 50 MG: 50 TABLET, FILM COATED ORAL at 08:30

## 2017-02-09 RX ADMIN — METHYLPREDNISOLONE SODIUM SUCCINATE 62.5 MG: 125 INJECTION, POWDER, FOR SOLUTION INTRAMUSCULAR; INTRAVENOUS at 00:35

## 2017-02-09 RX ADMIN — IPRATROPIUM BROMIDE AND ALBUTEROL SULFATE 3 ML: .5; 3 SOLUTION RESPIRATORY (INHALATION) at 14:54

## 2017-02-09 ASSESSMENT — ENCOUNTER SYMPTOMS
SHORTNESS OF BREATH: 1
PALPITATIONS: 0
EYES NEGATIVE: 1
PSYCHIATRIC NEGATIVE: 1
ORTHOPNEA: 0
PND: 0
NEUROLOGICAL NEGATIVE: 1
WHEEZING: 1
CLAUDICATION: 0
SPUTUM PRODUCTION: 1
CARDIOVASCULAR NEGATIVE: 1
BACK PAIN: 1
GASTROINTESTINAL NEGATIVE: 1
COUGH: 1

## 2017-02-09 ASSESSMENT — LIFESTYLE VARIABLES: EVER_SMOKED: YES

## 2017-02-09 NOTE — FACE TO FACE
Face to Face Note  -  Durable Medical Equipment    Mina Garcias M.D. - NPI: [id number]  I certify that this patient is under my care and that they had a durable medical equipment(DME)face to face encounter by myself that meets the physician DME face-to-face encounter requirements with this patient on:    Date of encounter:   Patient:                    MRN:                       YOB: 2016  Lacey Her  [id number]  1945     The encounter with the patient was in whole, or in part, for the following medical condition, which is the primary reason for durable medical equipment:  COPD    I certify that, based on my findings, the following durable medical equipment is medically necessary:  Oxygen.    HOME O2 Saturation Measurements:(Values must be present for Home Oxygen orders)  Room air sat at rest: 88  Room air sat with amb: 86 (tachy 101)   , O2 sat at rest with O2: 92   , O2 sat with amb with O2 : 89 (HR 98)       My Clinical findings support the need for the above equipment due to:  Hypoxia    Supporting Symptoms: respiratory failure from COPD and cardiomyopathy

## 2017-02-09 NOTE — CARE PLAN
Problem: Communication  Goal: The ability to communicate needs accurately and effectively will improve  Outcome: PROGRESSING AS EXPECTED  Pt is alert and oriented x 4 . Pt is oriented to the environment and call light. I have discussed with the pt the plan of care, treatments and medications and the pt verbalizes agreement and understanding. The pt has been able to communicate her needs effectively and has been given the opportunity to ask any questions. All pt needs have been met and questions have been answered at this time. Hourly rounding in place.         Problem: Safety  Goal: Will remain free from injury  Outcome: PROGRESSING AS EXPECTED  Pt assessed at beginning of shift. Pt determined to be standby assist . Fall precautions in place. Call light and personal possessions in reach, bed alarm off . Hourly rounding in place.

## 2017-02-09 NOTE — CARE PLAN
Problem: Acute Care of the Cardiac Cath Patient  Goal: Post Procedure Optimal Outcome for the Cardiac Cath Patient  Intervention: Bedrest per order, log roll every 2 hours on unaffected side, do not bend leg or lift head  Bed rest completed x3 hours.   Intervention: Vital signs every 15 minutes X 4, then every 30 minutes X 4, then every hour X 4  Vital signs stable.   Intervention: Assess peripheral pulses every 15 minutes X 4  Pulses 2+ bilaterally.   Intervention: Assess for hematoma every 15 minutes X 4  No signs of hematoma. Dressing CDI   Intervention: Cardiac Diet  Diet in place

## 2017-02-09 NOTE — PROGRESS NOTES
Handoff received. Pt came in from [facility] for RT groin cath. Revealed triple vessel disease, recommended to medical mngt. Expected DC, but needs walking pulse ox for home O2. Cards informed this RN that they would be S/O w/ review of meds. Bed low and locked, call bell in reach. SR/SB on tele.

## 2017-02-09 NOTE — PROGRESS NOTES
Cardiology Progress Note               Author: Maria Del Rosario Echols Date & Time created: 2017  7:16 AM     Interval History:        Consultation for abnormal MPI (showed ischemia, large anterior scar )      Admitted with shortness of breath worse with activity    History of COPD, CAD, CABG ×4v 2004 in [place], [place], hypertension, hyperlipidemia, diabetes, chronic back pain    Labs reviewed  NA, K, CR stable  Troponin on admit negative  BNP = 176    BP = 136/67  HR = 61 incomplete L bundle      Left heart catheter on 17, severe hypokinesis and akinesis of the proximal and mid anterior wall, EF 56, SVG to an unknown vessel 100% occluded, LIMA to LAD occluded, patent SVG to circumflex and distal RCA subclavian artery proximal stenosis 70%, retrograde collateral circulation to distal LAD from distal circumflex and RV branches of the RCA    Review of Systems   Respiratory: Positive for cough and shortness of breath.    Cardiovascular: Negative for chest pain, palpitations, orthopnea, claudication, leg swelling and PND.       Physical Exam   Constitutional: She is oriented to person, place, and time. She appears well-developed.   HENT:   Head: Normocephalic.   Eyes: Conjunctivae are normal.   Neck: No JVD present. No thyromegaly present.   Cardiovascular: Normal rate and regular rhythm.    Pulses:       Carotid pulses are 2+ on the right side, and 2+ on the left side.       Radial pulses are 2+ on the right side, and 2+ on the left side.        Posterior tibial pulses are 2+ on the right side, and 2+ on the left side.   Pulmonary/Chest: She has no wheezes.   Abdominal: Soft.   Musculoskeletal: She exhibits no edema.   Neurological: She is alert and oriented to person, place, and time.   Skin: Skin is warm and dry.   R groin C/D/I       Hemodynamics:  Temp (24hrs), Av.5 °C (97.7 °F), Min:36.3 °C (97.3 °F), Max:36.7 °C (98.1 °F)  Temperature: 36.4 °C (97.6 °F)  Pulse  Av.9  Min: 61  Max: 101   Blood  Pressure : 136/67 mmHg     Respiratory:    Respiration: 18, Pulse Oximetry: 94 %, O2 Daily Delivery Respiratory : Silicone Nasal Cannula     Given By:: Mouthpiece, Work Of Breathing / Effort: Mild  RUL Breath Sounds: Clear, RML Breath Sounds: Diminished, RLL Breath Sounds: Diminished, YUMIKO Breath Sounds: Clear, LLL Breath Sounds: Diminished  Fluids:        GI/Nutrition:  Orders Placed This Encounter   Procedures   • Diet Order     Standing Status: Standing      Number of Occurrences: 1      Standing Expiration Date:      Order Specific Question:  Diet:     Answer:  Cardiac [6]     Order Specific Question:  Diet:     Answer:  Diabetic [3]     Lab Results:  Recent Labs      02/07/17 0214 02/07/17   2331  02/09/17   0246   WBC  6.9  14.6*  10.0   RBC  5.19  5.59*  4.98   HEMOGLOBIN  14.6  15.6  14.0   HEMATOCRIT  44.3  47.0  42.5   MCV  85.4  84.1  85.3   MCH  28.1  27.9  28.1   MCHC  33.0*  33.2*  32.9*   RDW  47.5  47.9  49.1   PLATELETCT  218  271  243   MPV  9.9  9.9  9.9     Recent Labs      02/07/17 0214 02/07/17   2331 02/09/17   0246   SODIUM  136  136  135   POTASSIUM  3.3*  3.3*  3.6   CHLORIDE  104  101  103   CO2  24  25  26   GLUCOSE  140*  146*  126*   BUN  20  19  32*   CREATININE  0.69  0.73  0.74   CALCIUM  10.5*  11.3*  10.5     Recent Labs      02/06/17 1239 02/07/17   1653  02/07/17   2331 02/08/17   0426   APTT  29.2  25.3  78.6*  63.6*   INR  1.03   --    --   1.00     Recent Labs      02/06/17   1239  02/09/17   0246   BNPBTYPENAT  99  176*     Recent Labs      02/06/17   1239  02/06/17 2039  02/07/17 0214  02/09/17   0246   TROPONINI  0.05*  0.02  0.03   --    BNPBTYPENAT  99   --    --   176*     Recent Labs      02/07/17   0214   TRIGLYCERIDE  64   HDL  35*   LDL  60         Medical Decision Making, by Problem:  Active Hospital Problems    Diagnosis   • Bronchitis [J40]   • NSTEMI (non-ST elevated myocardial infarction) (CMS-[facility]) [I21.4]   • Acute respiratory failure with hypoxia  (CMS-HCC) [J96.01]   • COPD with acute exacerbation (CMS-HCC) [J44.1]   • Elevated troponin [R79.89]   • CAD (coronary artery disease) [I25.10]   • HLD (hyperlipidemia) [E78.5]   • DM (diabetes mellitus) (CMS-HCC) [E11.9]       Plan:    May need home O2  NO rhythm issues, NSR with LBBB  NO angina  Stable from cardiac stand  Will schedule follow up in 2 weeks    Left heart cath 2/8/17 showed occluded LIMA to LAD, occluded SVG to an unknown vessel, patent SVG to circumflex, patent SVG to distal RCA    EF 56%, hypokinesis/akinesis of the proximal and mid anterior wall    Continue with aspirin, Lipitor 40, losartan 50, Toprol-    Hypertension, on Amlodipine, blood pressure well controlled, in addition above medications     Medications reviewed and Labs reviewed

## 2017-02-09 NOTE — PROGRESS NOTES
Hospital Medicine Progress Note, Adult, Complex               Author: Minahay Garcias Date & Time created: 2/8/2017  5:12 PM     Interval History:  Patient seen and examined today.    Patient tolerating treatment and therapies.  All Data, Medication data reviewed.  Case discussed with nursing as available.  Plan of Care reviewed with patient and notified of changes.  2/8 Pt feels ok, s/p Cath and report just resulted, explained findings preliminary, Cards following, Pt with cough and Rx for COPD  Family at bedside    Review of Systems:  Review of Systems   Constitutional: Positive for malaise/fatigue.   HENT: Negative.    Eyes: Negative.    Respiratory: Positive for cough, sputum production, shortness of breath and wheezing.    Cardiovascular: Negative.  Negative for chest pain.   Gastrointestinal: Negative.    Genitourinary: Negative.    Musculoskeletal: Positive for back pain.   Skin: Negative.    Neurological: Negative.    Endo/Heme/Allergies: Negative.    Psychiatric/Behavioral: Negative.    All other systems reviewed and are negative.      Physical Exam:  Physical Exam   Constitutional: She is oriented to person, place, and time. She appears well-developed and well-nourished.   HENT:   Head: Normocephalic and atraumatic.   Nose: Nose normal.   Mouth/Throat: Oropharynx is clear and moist.   Eyes: Conjunctivae and EOM are normal. Pupils are equal, round, and reactive to light.   Neck: Normal range of motion. Neck supple. No JVD present. No thyromegaly present.   Cardiovascular: Normal rate, regular rhythm and normal heart sounds.  Exam reveals no gallop and no friction rub.    Pulmonary/Chest: Effort normal. No respiratory distress. She has wheezes. She has rales. She exhibits no tenderness.   Abdominal: Soft. Bowel sounds are normal. She exhibits no distension and no mass. There is no tenderness. There is no rebound and no guarding.   Musculoskeletal: Normal range of motion. She exhibits no edema or  tenderness.   Lymphadenopathy:     She has no cervical adenopathy.   Neurological: She is alert and oriented to person, place, and time. No cranial nerve deficit.   Skin: Skin is warm and dry. She is not diaphoretic.   Psychiatric: She has a normal mood and affect. Her behavior is normal.   Nursing note and vitals reviewed.      Labs:        Invalid input(s): [id number]  Recent Labs      17   TROPONINI  0.05*  0.02  0.03   BNPBTYPENAT  99   --    --      Recent Labs      17   SODIUM  135  136  136   POTASSIUM  3.6  3.3*  3.3*   CHLORIDE  101  104  101   CO2  25  24  25   BUN  16  20  19   CREATININE  0.72  0.69  0.73   MAGNESIUM   --   1.9   --    PHOSPHORUS   --   2.7   --    CALCIUM  10.4*  10.5*  11.3*     Recent Labs      17   ALTSGPT  16  15  12   ASTSGOT  20  19  23   ALKPHOSPHAT  109*  93  101*   TBILIRUBIN  1.2  0.9  0.7   GLUCOSE  111*  140*  146*     Recent Labs      17   1653  17   0426   RBC  5.72*  5.19   --   5.59*   --    HEMOGLOBIN  15.8  14.6   --   15.6   --    HEMATOCRIT  48.6*  44.3   --   47.0   --    PLATELETCT  230  218   --   271   --    PROTHROMBTM  13.3   --    --    --   13.5   APTT  29.2   --   25.3  78.6*  63.6*   INR  1.03   --    --    --   1.00     Recent Labs      17   WBC  6.4  6.9  14.6*   NEUTSPOLYS  74.90*  86.20*  83.30*   LYMPHOCYTES  15.10*  11.50*  9.90*   MONOCYTES  8.60  1.60  5.80   EOSINOPHILS  0.60  0.00  0.00   BASOPHILS  0.30  0.10  0.30   ASTSGOT  20  19  23   ALTSGPT  16  15  12   ALKPHOSPHAT  109*  93  101*   TBILIRUBIN  1.2  0.9  0.7           Hemodynamics:  Temp (24hrs), Av.6 °C (97.8 °F), Min:36.2 °C (97.2 °F), Max:37.1 °C (98.8 °F)  Temperature: 36.6 °C (97.8 °F)  Pulse  Av.5  Min: 64  Max: 101   Blood  Pressure : 123/83 mmHg     Respiratory:    Respiration: 20, Pulse Oximetry: 90 %, O2 Daily Delivery Respiratory : Silicone Nasal Cannula     Given By:: Mouthpiece, Work Of Breathing / Effort: Mild  RUL Breath Sounds: Expiratory Wheezes, RML Breath Sounds: Expiratory Wheezes, RLL Breath Sounds: Expiratory Wheezes, YUMIKO Breath Sounds: Expiratory Wheezes, LLL Breath Sounds: Expiratory Wheezes  Fluids:    Intake/Output Summary (Last 24 hours) at 02/08/17 1712  Last data filed at 02/08/17 0800   Gross per 24 hour   Intake      0 ml   Output     50 ml   Net    -50 ml     Weight: 77.7 kg (171 lb 4.8 oz)  GI/Nutrition:  Orders Placed This Encounter   Procedures   • Diet Order     Standing Status: Standing      Number of Occurrences: 1      Standing Expiration Date:      Order Specific Question:  Diet:     Answer:  Cardiac [6]     Order Specific Question:  Diet:     Answer:  Diabetic [3]     Medical Decision Making, by Problem:  Active Hospital Problems    Diagnosis   • CAD (coronary artery disease) [I25.10] with prio CABG in 2003 , s/p Cath, medical Tx suggested   • NSTEMI (non-ST elevated myocardial infarction) (CMS-HCC) [I21.4] as above   • Acute respiratory failure with hypoxia (CMS-HCC) [J96.01] wean Rx, c/w steroids RT   • COPD with acute exacerbation (CMS-HCC) [J44.1] with h/o extensive Tob abuse   • Elevated troponin [R79.89] mild   • Bronchitis [J40] on Rx, shirin add Mucinex   • HLD (hyperlipidemia) [E78.5] h/o   • DM (diabetes mellitus) (CMS-HCC) [E11.9] c/w control   - Obesity  -Tobacco abuse , in recent cesation  -hypokalemia  Plan  C/e RT  Cards Rx  Medical Tx  Steroids  Pulm assist  See orders  More than 35 minutes was spent in pt exam, interview, and more than 50 % of this in discussion of diagnoses, prognosis and disposition with patient, family, nurse and case management coordinator.  Radiology images reviewed, Labs reviewed and Medications reviewed        DVT Prophylaxis: Enoxaparin (Lovenox)      Antibiotics:  Treating active infection/contamination beyond 24 hours perioperative coverage  Assessed for rehab: Patient unable to tolerate rehabilitation therapeutic regimen

## 2017-02-09 NOTE — PROGRESS NOTES
Received report from day RN assumed care at 1915. Pt A&Ox 4 . Pt states 0 /10 pain at this time. Plan of care discussed with Pt, verbalized understanding.  family present at bedside. Assessment completed. All Pt needs met at this time. Call light within reach, bed alarm off , bed locked and in low position. Will continue to monitor.

## 2017-02-09 NOTE — CARE PLAN
Problem: Oxygenation:  Goal: Maintain adequate oxygenation dependent on patient condition  Outcome: PROGRESSING AS EXPECTED  Intervention: Manage oxygen therapy by monitoring pulse oximetry and/or ABG values  93% on 3.5LPM nasal cannula      Problem: Bronchoconstriction:  Goal: Improve in air movement and diminished wheezing  Outcome: PROGRESSING AS EXPECTED  Intervention: Implement inhaled treatments  DUO QID (home reg)

## 2017-02-10 NOTE — DISCHARGE PLANNING
Medical Social Work    Referral: DME/Home O2    Intervention: SW spoke with pt who gave verbal consent to send DME referral to [facility].  Choice form faxed to KEIRY Lopez, referral being sent to [facility].    Plan: Awaiting delivery of home O2.

## 2017-02-10 NOTE — DISCHARGE PLANNING
CCS spoke to Carleen at [facility]. Gave Carleen all the information regarding O2. She will contact the , and have the O2 out within the hour. JANEEN Ash notified.

## 2017-02-10 NOTE — CARE PLAN
Problem: Discharge Barriers/Planning  Goal: Patient’s continuum of care needs will be met  Outcome: MET Date Met:  02/09/17  Home O2 provided. Instructions/appointments reviewed. No other concerns, at this time.

## 2017-02-10 NOTE — PROGRESS NOTES
Hospital Medicine Progress Note, Adult, Complex               Author: Mina Adelinamachelle Date & Time created: 2/9/2017  5:17 PM     Interval History:  Patient seen and examined today.    Patient tolerating treatment and therapies.  All Data, Medication data reviewed.  Case discussed with nursing as available.  Plan of Care reviewed with patient and notified of changes.  2/8 Pt feels ok, s/p Cath and report just resulted, explained findings preliminary, Cards following, Pt with cough and Rx for COPD  Family at bedside  2/9 Pt feels much improved today, no CP, cough and resp. Congestion much better, ok for d/c with O2 and close Cards f/u    Review of Systems:  Review of Systems   Constitutional: Positive for malaise/fatigue.   HENT: Negative.    Eyes: Negative.    Respiratory: Positive for cough, sputum production, shortness of breath and wheezing.    Cardiovascular: Negative.  Negative for chest pain.   Gastrointestinal: Negative.    Genitourinary: Negative.    Musculoskeletal: Positive for back pain.   Skin: Negative.    Neurological: Negative.    Endo/Heme/Allergies: Negative.    Psychiatric/Behavioral: Negative.    All other systems reviewed and are negative.      Physical Exam:  Physical Exam   Constitutional: She is oriented to person, place, and time. She appears well-developed and well-nourished.   HENT:   Head: Normocephalic and atraumatic.   Nose: Nose normal.   Mouth/Throat: Oropharynx is clear and moist.   Eyes: Conjunctivae and EOM are normal. Pupils are equal, round, and reactive to light.   Neck: Normal range of motion. Neck supple. No JVD present. No thyromegaly present.   Cardiovascular: Normal rate, regular rhythm and normal heart sounds.  Exam reveals no gallop and no friction rub.    Pulmonary/Chest: Effort normal. No respiratory distress. She has wheezes. She has rales. She exhibits no tenderness.   Abdominal: Soft. Bowel sounds are normal. She exhibits no distension and no mass. There is no  tenderness. There is no rebound and no guarding.   Musculoskeletal: Normal range of motion. She exhibits no edema or tenderness.   Lymphadenopathy:     She has no cervical adenopathy.   Neurological: She is alert and oriented to person, place, and time. No cranial nerve deficit.   Skin: Skin is warm and dry. She is not diaphoretic.   Psychiatric: She has a normal mood and affect. Her behavior is normal.   Nursing note and vitals reviewed.      Labs:        Invalid input(s): [id number]  Recent Labs      17   TROPONINI  0.02  0.03   --    BNPBTYPENAT   --    --   176*     Recent Labs      17   SODIUM  136  136  135   POTASSIUM  3.3*  3.3*  3.6   CHLORIDE  104  101  103   CO2  24  25  26   BUN  20  19  32*   CREATININE  0.69  0.73  0.74   MAGNESIUM  1.9   --   2.1   PHOSPHORUS  2.7   --   2.1*   CALCIUM  10.5*  11.3*  10.5     Recent Labs      17   024   ALTSGPT  15  12  12   ASTSGOT  19  23  20   ALKPHOSPHAT  93  101*  77   TBILIRUBIN  0.9  0.7  0.6   GLUCOSE  140*  146*  126*     Recent Labs      17   1653  17   0426  17   0246   RBC  5.19   --   5.59*   --   4.98   HEMOGLOBIN  14.6   --   15.6   --   14.0   HEMATOCRIT  44.3   --   47.0   --   42.5   PLATELETCT  218   --   271   --   243   PROTHROMBTM   --    --    --   13.5   --    APTT   --   25.3  78.6*  63.6*   --    INR   --    --    --   1.00   --      Recent Labs      17   WBC  6.9  14.6*  10.0   NEUTSPOLYS  86.20*  83.30*  85.70*   LYMPHOCYTES  11.50*  9.90*  8.10*   MONOCYTES  1.60  5.80  5.10   EOSINOPHILS  0.00  0.00  0.00   BASOPHILS  0.10  0.30  0.30   ASTSGOT  19  23  20   ALTSGPT  15  12  12   ALKPHOSPHAT  93  101*  77   TBILIRUBIN  0.9  0.7  0.6           Hemodynamics:  Temp (24hrs), Av.4 °C (97.6 °F), Min:35.9 °C  (96.6 °F), Max:36.7 °C (98.1 °F)  Temperature: 36.1 °C (97 °F)  Pulse  Av.8  Min: 61  Max: 101   Blood Pressure : 138/69 mmHg     Respiratory:    Respiration: 18, Pulse Oximetry: 91 %, O2 Daily Delivery Respiratory : Silicone Nasal Cannula     Given By:: Mouthpiece, #MDI/DPI Given: MDI/DPI x 1, Work Of Breathing / Effort: Mild  RUL Breath Sounds: Diminished, RML Breath Sounds: Diminished, RLL Breath Sounds: Diminished, YUMIKO Breath Sounds: Diminished, LLL Breath Sounds: Diminished  Fluids:    Intake/Output Summary (Last 24 hours) at 17 1717  Last data filed at 17 0400   Gross per 24 hour   Intake    480 ml   Output      0 ml   Net    480 ml        GI/Nutrition:  Orders Placed This Encounter   Procedures   • Diet Order     Standing Status: Standing      Number of Occurrences: 1      Standing Expiration Date:      Order Specific Question:  Diet:     Answer:  Cardiac [6]     Order Specific Question:  Diet:     Answer:  Diabetic [3]     Medical Decision Making, by Problem:  Active Hospital Problems    Diagnosis   • CAD (coronary artery disease) [I25.10] with prio CABG in  , s/p Cath, medical Tx suggested   • NSTEMI (non-ST elevated myocardial infarction) (CMS[facility]) [I21.4] as above   • Acute respiratory failure with hypoxia (CMS[facility]) [J96.01] wean Rx, c/w steroids RT   • COPD with acute exacerbation (CMS[facility]) [J44.1] with h/o extensive Tob abuse   • Elevated troponin [R79.89] mild   • Bronchitis [J40] on Rx, shirin add Mucinex   • HLD (hyperlipidemia) [E78.5] h/o   • DM (diabetes mellitus) (CMS[facility]) [E11.9] c/w control   - Obesity  - Tobacco abuse , in recent cesation  - hypokalemia  Plan  Ok for d/f to home  F/u closely with Cards  pulm Rx and home O2  See orders and d/c summary   Radiology images reviewed, Labs reviewed and Medications reviewed        DVT Prophylaxis: Enoxaparin (Lovenox)      Antibiotics: Treating active infection/contamination beyond 24 hours perioperative coverage  Assessed for  rehab: Patient unable to tolerate rehabilitation therapeutic regimen

## 2017-02-10 NOTE — DISCHARGE INSTRUCTIONS
Discharge Instructions    Discharged to home by car with relative. Discharged via wheelchair, hospital escort: Yes.  Special equipment needed: Oxygen    Be sure to schedule a follow-up appointment with your primary care doctor or any specialists as instructed.     Discharge Plan:   Diet Plan: Discussed  Activity Level: Discussed  Smoking Cessation Offered: Patient Counseled  Confirmed Follow up Appointment: Appointment Scheduled  Confirmed Symptoms Management: Discussed  Medication Reconciliation Updated: Yes  Influenza Vaccine Indication: Not indicated: Previously immunized this influenza season and > 8 years of age    I understand that a diet low in cholesterol, fat, and sodium is recommended for good health. Unless I have been given specific instructions below for another diet, I accept this instruction as my diet prescription.   Other diet: heart healthy    Special Instructions: None    · Is patient discharged on Warfarin / Coumadin?   No     · Is patient Post Blood Transfusion?  No    Angiogram, Care After  These instructions give you information about caring for yourself after your procedure. Your doctor may also give you more specific instructions. Call your doctor if you have any problems or questions after your procedure.   HOME CARE  · Take medicines only as told by your doctor.  · Follow your doctor's instructions about:  · Care of the area where the tube was inserted.  · Bandage (dressing) changes and removal.  · You may shower 24-48 hours after the procedure or as told by your doctor.  · Do not take baths, swim, or use a hot tub until your doctor approves.  · Every day, check the area where the tube was inserted. Watch for:  · Redness, swelling, or pain.  · Fluid, blood, or pus.  · Do not apply powder or lotion to the site.  · Do not lift anything that is heavier than 10 lb (4.5 kg) for 5 days or as told by your doctor.  · Ask your doctor when you can:  · Return to work or school.  · Do physical  activities or play sports.  · Have sex.  · Do not drive or operate heavy machinery for 24 hours or as told by your doctor.  · Have someone with you for the first 24 hours after the procedure.  · Keep all follow-up visits as told by your doctor. This is important.  GET HELP IF:  · You have a fever.    · You have chills.    · You have more bleeding from the area where the tube was inserted. Hold pressure on the area.  · You have redness, swelling, or pain in the area where the tube was inserted.  · You have fluid or pus coming from the area.  GET HELP RIGHT AWAY IF:   · You have a lot of pain in the area where the tube was inserted.  · The area where the tube was inserted is bleeding, and the bleeding does not stop after 30 minutes of holding steady pressure on the area.  · The area near or just beyond the insertion site becomes pale, cool, tingly, or numb.     This information is not intended to replace advice given to you by your health care provider. Make sure you discuss any questions you have with your health care provider.     Document Released: 03/16/2010 Document Revised: 01/08/2016 Document Reviewed: 07/06/2015  [employer] Interactive Patient Education ©2016 [employer]    Heart-Healthy Eating Plan  Many factors influence your heart health, including eating and exercise habits. Heart (coronary) risk increases with abnormal blood fat (lipid) levels. Heart-healthy meal planning includes limiting unhealthy fats, increasing healthy fats, and making other small dietary changes. This includes maintaining a healthy body weight to help keep lipid levels within a normal range.  WHAT IS MY PLAN?   Your health care provider recommends that you:  · Get no more than _________% of the total calories in your daily diet from fat.  · Limit your intake of saturated fat to less than _________% of your total calories each day.  · Limit the amount of cholesterol in your diet to less than _________ mg per day.  WHAT TYPES OF FAT  "SHOULD I CHOOSE?  · Choose healthy fats more often. Choose monounsaturated and polyunsaturated fats, such as olive oil and canola oil, flaxseeds, walnuts, almonds, and seeds.  · Eat more omega-3 fats. Good choices include salmon, mackerel, sardines, tuna, flaxseed oil, and ground flaxseeds. Aim to eat fish at least two times each week.  · Limit saturated fats. Saturated fats are primarily found in animal products, such as meats, butter, and cream. Plant sources of saturated fats include palm oil, palm kernel oil, and coconut oil.  · Avoid foods with partially hydrogenated oils in them. These contain trans fats. Examples of foods that contain trans fats are stick margarine, some tub margarines, cookies, crackers, and other baked goods.  WHAT GENERAL GUIDELINES DO I NEED TO FOLLOW?  · Check food labels carefully to identify foods with trans fats or high amounts of saturated fat.  · Fill one half of your plate with vegetables and green salads. Eat 4-5 servings of vegetables per day. A serving of vegetables equals 1 cup of raw leafy vegetables, ½ cup of raw or cooked cut-up vegetables, or ½ cup of vegetable juice.  · Fill one fourth of your plate with whole grains. Look for the word \"whole\" as the first word in the ingredient list.  · Fill one fourth of your plate with lean protein foods.  · Eat 4-5 servings of fruit per day. A serving of fruit equals one medium whole fruit, ¼ cup of dried fruit, ½ cup of fresh, frozen, or canned fruit, or ½ cup of 100% fruit juice.  · Eat more foods that contain soluble fiber. Examples of foods that contain this type of fiber are apples, broccoli, carrots, beans, peas, and barley. Aim to get 20-30 g of fiber per day.  · Eat more home-cooked food and less restaurant, buffet, and fast food.  · Limit or avoid alcohol.  · Limit foods that are high in starch and sugar.  · Avoid fried foods.  · Cook foods by using methods other than frying. Baking, boiling, grilling, and broiling are all " great options. Other fat-reducing suggestions include:  ¨ Removing the skin from poultry.  ¨ Removing all visible fats from meats.  ¨ Skimming the fat off of stews, soups, and gravies before serving them.  ¨ Steaming vegetables in water or broth.  · Lose weight if you are overweight. Losing just 5-10% of your initial body weight can help your overall health and prevent diseases such as diabetes and heart disease.  · Increase your consumption of nuts, legumes, and seeds to 4-5 servings per week. One serving of dried beans or legumes equals ½ cup after being cooked, one serving of nuts equals 1½ ounces, and one serving of seeds equals ½ ounce or 1 tablespoon.  · You may need to monitor your salt (sodium) intake, especially if you have high blood pressure. Talk with your health care provider or dietitian to get more information about reducing sodium.  WHAT FOODS CAN I EAT?  Grains  Breads, including Romansh, white, reed, wheat, raisin, rye, oatmeal, and Italian. Tortillas that are neither fried nor made with lard or trans fat. Low-fat rolls, including hotdog and hamburger buns and English muffins. Biscuits. Muffins. Waffles. Pancakes. Light popcorn. Whole-grain cereals. Flatbread. Sarah Ann toast. Pretzels. Breadsticks. Rusks. Low-fat snacks and crackers, including oyster, saltine, matzo, les, animal, and rye. Rice and pasta, including brown rice and those that are made with whole wheat.  Vegetables  All vegetables.  Fruits  All fruits, but limit coconut.  Meats and Other Protein Sources  Lean, well-trimmed beef, veal, pork, and lamb. Chicken and turkey without skin. All fish and shellfish. Wild duck, rabbit, pheasant, and venison. Egg whites or low-cholesterol egg substitutes. Dried beans, peas, lentils, and tofu. Seeds and most nuts.  Dairy  Low-fat or nonfat cheeses, including ricotta, string, and mozzarella. Skim or 1% milk that is liquid, powdered, or evaporated. Buttermilk that is made with low-fat milk. Nonfat or  low-fat yogurt.  Beverages  Mineral water. Diet carbonated beverages.  Sweets and Desserts  Sherbets and fruit ices. Honey, jam, marmalade, jelly, and syrups. Meringues and gelatins. Pure sugar candy, such as hard candy, jelly beans, gumdrops, mints, marshmallows, and small amounts of dark chocolate. Mir food cake.  Eat all sweets and desserts in moderation.  Fats and Oils  Nonhydrogenated (trans-free) margarines. Vegetable oils, including soybean, sesame, sunflower, olive, peanut, safflower, corn, canola, and cottonseed. Salad dressings or mayonnaise that are made with a vegetable oil. Limit added fats and oils that you use for cooking, baking, salads, and as spreads.  Other  Cocoa powder. Coffee and tea. All seasonings and condiments.  The items listed above may not be a complete list of recommended foods or beverages. Contact your dietitian for more options.  WHAT FOODS ARE NOT RECOMMENDED?  Grains  Breads that are made with saturated or trans fats, oils, or whole milk. Croissants. Butter rolls. Cheese breads. Sweet rolls. Donuts. Buttered popcorn. Chow mein noodles. High-fat crackers, such as cheese or butter crackers.  Meats and Other Protein Sources  Fatty meats, such as hotdogs, short ribs, sausage, spareribs, collins, ribeye roast or steak, and mutton. High-fat deli meats, such as salami and bologna. Caviar. Domestic duck and goose. Organ meats, such as kidney, liver, sweetbreads, brains, gizzard, chitterlings, and heart.  Dairy  Cream, sour cream, cream cheese, and creamed cottage cheese. Whole milk cheeses, including blue (faviola), [place], Brie, [place], American, Havarti, Swiss, cheddar, Camembert, and [place].  Whole or 2% milk that is liquid, evaporated, or condensed. Whole buttermilk. Cream sauce or high-fat cheese sauce. Yogurt that is made from whole milk.  Beverages  Regular sodas and drinks with added sugar.  Sweets and Desserts  Frosting. Pudding. Cookies. Cakes other than mir food cake.  Candy that has milk chocolate or white chocolate, hydrogenated fat, butter, coconut, or unknown ingredients. Buttered syrups. Full-fat ice cream or ice cream drinks.  Fats and Oils  Gravy that has suet, meat fat, or shortening. Cocoa butter, hydrogenated oils, palm oil, coconut oil, palm kernel oil. These can often be found in baked products, candy, fried foods, nondairy creamers, and whipped toppings. Solid fats and shortenings, including collins fat, salt pork, lard, and butter. Nondairy cream substitutes, such as coffee creamers and sour cream substitutes. Salad dressings that are made of unknown oils, cheese, or sour cream.  The items listed above may not be a complete list of foods and beverages to avoid. Contact your dietitian for more information.     This information is not intended to replace advice given to you by your health care provider. Make sure you discuss any questions you have with your health care provider.     Document Released: 09/26/2009 Document Revised: 01/08/2016 Document Reviewed: 06/11/2015  [employer] Interactive Patient Education ©2016 [employer]        Depression / Suicide Risk    As you are discharged from this [facility] facility, it is important to learn how to keep safe from harming yourself.    Recognize the warning signs:  · Abrupt changes in personality, positive or negative- including increase in energy   · Giving away possessions  · Change in eating patterns- significant weight changes-  positive or negative  · Change in sleeping patterns- unable to sleep or sleeping all the time   · Unwillingness or inability to communicate  · Depression  · Unusual sadness, discouragement and loneliness  · Talk of wanting to die  · Neglect of personal appearance   · Rebelliousness- reckless behavior  · Withdrawal from people/activities they love  · Confusion- inability to concentrate     If you or a loved one observes any of these behaviors or has concerns about self-harm, here's what you  can do:  · Talk about it- your feelings and reasons for harming yourself  · Remove any means that you might use to hurt yourself (examples: pills, rope, extension cords, firearm)  · Get professional help from the community (Mental Health, Substance Abuse, psychological counseling)  · Do not be alone:Call your Safe Contact- someone whom you trust who will be there for you.  · Call your local CRISIS HOTLINE 747-7069 or 365-025-2504  · Call your local Children's Mobile Crisis Response Team [place] (124) 158-7593 or www.Zappli  · Call the toll free National Suicide Prevention Hotlines   · National Suicide Prevention Lifeline 419-192-ONMI (3211)  · National Hope Line Network 800-SUICIDE (902-4748)

## 2017-02-11 LAB
BACTERIA BLD CULT: NORMAL
BACTERIA BLD CULT: NORMAL
SIGNIFICANT IND 70042: NORMAL
SIGNIFICANT IND 70042: NORMAL
SITE SITE: NORMAL
SITE SITE: NORMAL
SOURCE SOURCE: NORMAL
SOURCE SOURCE: NORMAL

## 2017-02-11 NOTE — DISCHARGE SUMMARY
The patient initially was admitted to [facility].    DISCHARGE CONDITION:  Medically stable and improved.    DISCHARGE FOLLOWUP:  Cardiology with Dr. Harvey Olivia and colleagues.    DISCHARGE DIAGNOSES:  1.  Non-ST elevation myocardial infarction.  The patient is status post   coronary angiography.  The patient had a history of coronary artery disease   ____ coronary artery bypass grafting, is found now with ejection fraction of   56%.  She does have occluded bypass graft, LIMA to the LAD and saphenous vein   graft to an unknown vessel.  Patient does have patent saphenous vein graft   bypass to the circumflex marginal branch and distal right coronary artery.    The patient does have a subclavian artery proximal stenosis of 70%.  2.  Chronic obstructive pulmonary disease with chronic obstructive pulmonary   disease exacerbation and bronchitis.  This is currently improved.  3.  Status post acute hypoxemic respiratory failure.  4.  Diabetes type 2.  5.  Dyslipidemia.  6.  Recent tobacco cessation with longstanding prior tobacco abuse.  7.  Chronic back pain.  8.  Dietary obesity.    DISCHARGE MEDICATIONS:  1.  Albuterol 1 puff q. 6 p.r.n.  2.  Amlodipine 5 mg daily.  3.  Aspirin 81 mg p.o. daily.  4.  Lipitor 40 mg daily.  5.  Azithromycin 250 mg p.o. daily x4 more days.  6.  Baclofen 10 mg daily p.r.n. spasms.  7.  Tessalon 100 mg p.o. t.i.d. p.r.n. cough.  8.  Flovent 110 mcg 2 puffs b.i.d. inhaled.  9.  Lasix 20 mg daily.  10.  Robitussin 10 mL q. 6 p.r.n. cough.  11.  Hyzaar 50/12.5 one tablet daily.  12.  Glucophage 500 mg p.o. b.i.d.  13.  Medrol Dosepak per blister pack as prescribed and directed.  14.  Metoprolol  mg p.o. daily.  15.  Potassium chloride ____ mEq p.o. daily.    For presenting symptoms, HPI, and physical findings, please refer to the   dictated H and P.    CONSULTATIONS OBTAINED:  Dr. Jana Humphrey from cardiology.    PROCEDURES PERFORMED:  Coronary angiography by   [facility] on 02/08/2017   with the above-mentioned findings.  The patient tolerated the procedure well.    There was no other acute stenosis or narrowing found.    HOSPITAL COURSE:  The patient was admitted with chest pain and dyspnea.  The   patient was treated for a non-ST elevation myocardial infarction.  The patient   was transferred to [facility] after a positive stress test.  She was   undergoing angiography with the above-mentioned results.  The patient had no   further chest pain.  She was identified to have some COPD exacerbation and   bronchitis and pulmonary congestion.  This was improving on medication regimen   and respiratory treatment.  The patient had a CT of the chest, high   resolution, which was found with no evidence of mass or other significant   lesions.  The patient does have some calcification, parenchymal scarring, but   otherwise no definitive lesions.  The patient was treated conservatively,   currently improved and stabilized.  Her echocardiogram revealed ejection   fraction of 70% with moderate concentric LVH and right heart pressure   consistent with mild pulmonary hypertension.  Patient at this time is   improved.  He will be discharged with the above-mentioned medication regimen   and close outpatient followup with primary care physician as well as   cardiology.    Time spent on discharge is 45 minutes.       ____________________________________     MD JOANN BELLAMY / MALIA    DD:  02/09/2017 17:25:05  DT:  02/10/2017 18:58:01    D#:  [id number]  Job#:  [id number]

## 2017-02-28 NOTE — H&P
CHIEF COMPLAINT:  Shortness of breath and cough.    HISTORY OF PRESENT ILLNESS:  This is a 71-year-old female with a 3-day history   of shortness of breath, was worse with activity and improved with rest.  She   has a known history of heart disease, status post CABG and underwent a stress   test today that showed a large area of infarction in the anterior area with   some marked amount of superimposed ischemia.  She is to be transferred from   [facility] to [facility] for consideration   of cardiac catheterization at recommendation of Dr. Jonn Humphrey, a   cardiology who I spoke with today regarding this.  She has never been on   previous home oxygen.  At the time of my evaluation, she is chest pain free   and somewhat fearful of her new diagnosis, but otherwise feeling well.    REVIEW OF SYSTEMS:  Otherwise negative.    PAST MEDICAL HISTORY:  1.  COPD.  2.  Coronary artery disease.  3.  Hypertension.  4.  Hyperlipidemia.  5.  Diabetes.  6.  Chronic back pain.    PAST SURGICAL HISTORY:  CABG x4.    ALLERGIES:  No known drug allergies.    OUTPATIENT MEDICATIONS:  1.  Albuterol metered dose inhaler.  2.  Toprol- mg a day.  3.  Lipitor 40 mg a day.  4.  Norvasc 5 mg a day.  5.  Hyzaar 50/12.5 one tab a day.  6.  Glucophage 500 mg 2 times a day.  7.  Baclofen 10 mg every day.    SOCIAL HISTORY:  Previously a pack a day smoker, quit last year.  Denies drugs   or alcohol.    FAMILY HISTORY:  No coronary or cerebrovascular disease run in family;   however, states that her mother had congestive heart failure.    PHYSICAL EXAMINATION:  GENERAL:  Somewhat tearful female in mild amount of distress.  VITAL SIGNS:  Temperature is 36.6, heart rate is 77, respirations are 17,   pulse ox 93% on 3 liters nasal cannula, blood pressure 118/63.  LUNGS:  Exhibit decreased breath sounds throughout all lung fields.  HEART:  Regular rate and rhythm without murmur.  ABDOMEN:  Benign.  There is a  midline sternotomy scar that is well healed and   old.  Bowel sounds are normal and active.  There is no organomegaly.  JOINTS:  Joints show mild osteoarthritic changes.  SKIN:  Intact without lesion or rash.  EXTREMITIES:  Show trace bilateral pretibial edema.  HEENT:  Oropharynx is clear.  Extraocular movements are intact.  NEUROLOGIC:  She is otherwise alert and oriented and generally nonfocal.    RESULTS REVIEW:  CBC is unremarkable.  Metabolic panel; potassium is 3.3,   glucose 140, calcium is 10.5.  LDL was 60.  Troponin initially was 0.05, it is   now 0.02 and 0.03.  INR is 1.03.  CT scan of the chest, high resolution lung   shows no evidence for interstitial lung disease, shows a left lower lobe   peripheral calcification suggestive of scarring versus malignancy.  There is   no evidence for bullous disease.  Echocardiogram shows an ejection fraction of   70%, moderate concentric left ventricular hypertrophy.    ASSESSMENT:  1.  Non-ST elevation myocardial infarction.  2.  Mild hypercalcemia.  3.  Query possible lung mass left lower lobe.  4.  Chronic obstructive pulmonary disease exacerbation.  5.  Acute hypoxemic respiratory failure.  6.  Type 2 diabetes with vascular complications.  7.  Hyperlipidemia.    PLAN:  The patient will be transferred to [facility].  She   will be placed onto a heparin drip.  She is not needing nitroglycerin at this   point given she has no active chest pain.  Cardiology is where the patient   will be contacted upon arrival, therefore consideration of a catheterization   at their suggestion.  She will have her calcium trended and further workup for   this likely is needed in the near future after her cardiac issues are   resolved.  She is on an aspirin, a statin and beta blocker.  She likely needs   to be on an ACE inhibitor instead of her Norvasc depending on her clinical   course.  Medical decision making in this patient is complex.        ____________________________________     MD DIANA NGUYEN    DD:  02/07/2017 16:05:18  DT:  02/07/2017 18:57:30    D#:  [id number]  Job#:  [id number]

## 2017-02-28 NOTE — DISCHARGE SUMMARY
CHIEF COMPLAINT:  Shortness of breath and cough.    HISTORY OF PRESENT ILLNESS:  This is a 71-year-old female with a 3-day history   of shortness of breath, was worse with activity and improved with rest.  She   has a known history of heart disease, status post CABG and underwent a stress   test today that showed a large area of infarction in the anterior area with   some marked amount of superimposed ischemia.  She is to be transferred from   [facility] to [facility] for consideration   of cardiac catheterization at recommendation of Dr. Jonn Humphrey, a   cardiology who I spoke with today regarding this.  She has never been on   previous home oxygen.  At the time of my evaluation, she is chest pain free   and somewhat fearful of her new diagnosis, but otherwise feeling well.    REVIEW OF SYSTEMS:  Otherwise negative.    PAST MEDICAL HISTORY:  1.  COPD.  2.  Coronary artery disease.  3.  Hypertension.  4.  Hyperlipidemia.  5.  Diabetes.  6.  Chronic back pain.    PAST SURGICAL HISTORY:  CABG x4.    ALLERGIES:  No known drug allergies.    OUTPATIENT MEDICATIONS:  1.  Albuterol metered dose inhaler.  2.  Toprol- mg a day.  3.  Lipitor 40 mg a day.  4.  Norvasc 5 mg a day.  5.  Hyzaar 50/12.5 one tab a day.  6.  Glucophage 500 mg 2 times a day.  7.  Baclofen 10 mg every day.    SOCIAL HISTORY:  Previously a pack a day smoker, quit last year.  Denies drugs   or alcohol.    FAMILY HISTORY:  No coronary or cerebrovascular disease run in family;   however, states that her mother had congestive heart failure.    PHYSICAL EXAMINATION:  GENERAL:  Somewhat tearful female in mild amount of distress.  VITAL SIGNS:  Temperature is 36.6, heart rate is 77, respirations are 17,   pulse ox 93% on 3 liters nasal cannula, blood pressure 118/63.  LUNGS:  Exhibit decreased breath sounds throughout all lung fields.  HEART:  Regular rate and rhythm without murmur.  ABDOMEN:  Benign.  There is a  midline sternotomy scar that is well healed and   old.  Bowel sounds are normal and active.  There is no organomegaly.  JOINTS:  Joints show mild osteoarthritic changes.  SKIN:  Intact without lesion or rash.  EXTREMITIES:  Show trace bilateral pretibial edema.  HEENT:  Oropharynx is clear.  Extraocular movements are intact.  NEUROLOGIC:  She is otherwise alert and oriented and generally nonfocal.    RESULTS REVIEW:  CBC is unremarkable.  Metabolic panel; potassium is 3.3,   glucose 140, calcium is 10.5.  LDL was 60.  Troponin initially was 0.05, it is   now 0.02 and 0.03.  INR is 1.03.  CT scan of the chest, high resolution lung   shows no evidence for interstitial lung disease, shows a left lower lobe   peripheral calcification suggestive of scarring versus malignancy.  There is   no evidence for bullous disease.  Echocardiogram shows an ejection fraction of   70%, moderate concentric left ventricular hypertrophy.    ASSESSMENT:  1.  Non-ST elevation myocardial infarction.  2.  Mild hypercalcemia.  3.  Query possible lung mass left lower lobe.  4.  Chronic obstructive pulmonary disease exacerbation.  5.  Acute hypoxemic respiratory failure.  6.  Type 2 diabetes with vascular complications.  7.  Hyperlipidemia.    PLAN:  The patient will be transferred to [facility].  She   will be placed onto a heparin drip.  She is not needing nitroglycerin at this   point given she has no active chest pain.  Cardiology is where the patient   will be contacted upon arrival, therefore consideration of a catheterization   at their suggestion.  She will have her calcium trended and further workup for   this likely is needed in the near future after her cardiac issues are   resolved.  She is on an aspirin, a statin and beta blocker.  She likely needs   to be on an ACE inhibitor instead of her Norvasc depending on her clinical   course.  Medical decision making in this patient is complex.        ____________________________________     MD DIANA NGUYEN    DD:  02/07/2017 16:05:18  DT:  02/07/2017 18:57:30    D#:  [id number]  Job#:  [id number]

## 2017-04-04 NOTE — PROGRESS NOTES
After Visit Summary   4/4/2017    Kimberley Choe    MRN: [id number]           Patient Information     Date Of Birth          1979        Visit Information        Provider Department      4/4/2017 10:00 AM Kirsten Randall MD [facility]        Today's Diagnoses     Gastroenteritis    -  1    Dehydration          Care Instructions          Thank you for choosing [facility]  You may be receiving a survey in the mail from [facility] regarding your visit today.  Please take a few minutes to complete and return the survey to let us know how we are doing.      If you have questions or concerns, please contact us via [employer] or you can contact your care team at [phone].    Our Clinic hours are:  Monday 6:40 am  to 7:00 pm  Tuesday -Friday 6:40 am to 5:00 pm    The [place] outpatient lab hours are:  Monday - Friday 6:10 am to 4:45 pm  Saturdays 7:00 am to 11:00 am  Appointments are required, call [phone]    If you have clinical questions after hours or would like to schedule an appointment,  call the clinic at [phone].          Follow-ups after your visit        Your next 10 appointments already scheduled     Apr 24, 2017  2:30 PM CDT   Williams Booker with Charisma Santacruz MD   [facility] ([facility]    [address] [address] [phone]              Who to contact     If you have questions or need follow up information about today's clinic visit or your schedule please contact [facility] directly at [phone].  Normal or non-critical lab and imaging results will be communicated to you by MyChart, letter or phone within 4 business days after the clinic has received the results. If you do not hear from us within 7 days, please contact the clinic through [employer] or phone. If you have a critical or abnormal lab result, we will notify you by phone as soon as possible.  Submit refill requests through  Bedside report received from Maryjane ARIAS, patient updated about POC and has no complaints at this time, safety precautions in place, all lines remain patent, CLIP   "MyChart or call your pharmacy and they will forward the refill request to us. Please allow 3 business days for your refill to be completed.          Additional Information About Your Visit        MyChart Information     [employer] gives you secure access to your electronic health record. If you see a primary care provider, you can also send messages to your care team and make appointments. If you have questions, please call your primary care clinic.  If you do not have a primary care provider, please call [phone] and they will assist you.        Care EveryWhere ID     This is your Care EveryWhere ID. This could be used by other organizations to access your [place] medical records  [id number]        Your Vitals Were     Pulse Temperature Height BMI (Body Mass Index)          92 97.4  F (36.3  C) (Tympanic) 5' 4\" (1.626 m) 24.65 kg/m2         Blood Pressure from Last 3 Encounters:   04/04/17 123/70   11/22/16 125/76   03/10/16 111/71    Weight from Last 3 Encounters:   04/04/17 143 lb 9.6 oz (65.1 kg)   11/22/16 141 lb (64 kg)   03/10/16 136 lb 12.8 oz (62.1 kg)              Today, you had the following     No orders found for display       Primary Care Provider Office Phone # Fax #    Yari MING Coleman [facility] [phone]       [facility] [address] [address]        Thank you!     Thank you for choosing [facility]  for your care. Our goal is always to provide you with excellent care. Hearing back from our patients is one way we can continue to improve our services. Please take a few minutes to complete the written survey that you may receive in the mail after your visit with us. Thank you!             Your Updated Medication List - Protect others around you: Learn how to safely use, store and throw away your medicines at www.disposemymeds.org.          This list is accurate as of: 4/4/17 10:02 AM.  Always use your most recent med list.                   Brand " Name Dispense Instructions for use    levothyroxine 112 MCG tablet    SYNTHROID/LEVOTHROID    90 tablet    Take 1 tablet (112 mcg) by mouth daily

## 2019-11-12 ENCOUNTER — OFFICE VISIT (OUTPATIENT)
Dept: ENDOCRINOLOGY | Facility: MEDICAL CENTER | Age: 74
End: 2019-11-12
Payer: MEDICARE

## 2019-11-12 VITALS
WEIGHT: 160 LBS | OXYGEN SATURATION: 90 % | SYSTOLIC BLOOD PRESSURE: 146 MMHG | DIASTOLIC BLOOD PRESSURE: 84 MMHG | HEART RATE: 108 BPM | HEIGHT: 64 IN | BODY MASS INDEX: 27.31 KG/M2

## 2019-11-12 DIAGNOSIS — E21.3 HYPERPARATHYROIDISM (HCC): ICD-10-CM

## 2019-11-12 DIAGNOSIS — E83.52 HYPERCALCEMIA: ICD-10-CM

## 2019-11-12 DIAGNOSIS — E55.9 VITAMIN D DEFICIENCY: ICD-10-CM

## 2019-11-12 PROCEDURE — 99204 OFFICE O/P NEW MOD 45 MIN: CPT | Performed by: INTERNAL MEDICINE

## 2019-11-12 RX ORDER — FUROSEMIDE 20 MG/1
20 TABLET ORAL DAILY
COMMUNITY

## 2019-11-12 RX ORDER — CINACALCET 30 MG/1
30 TABLET, FILM COATED ORAL DAILY
Qty: 90 TAB | Refills: 3 | Status: SHIPPED
Start: 2019-11-12 | End: 2020-01-10

## 2019-11-12 NOTE — PROGRESS NOTES
New Patient Consult Note  Primary care physician: Mary Arias D.O.    Reason for consult: Hyperparathyroidism.    HPI:  Lacey Her is a 74 y.o. old patient who comes in today for evaluation of Hyperparathyroidism.  Her lab work from 6/26/19 shows a high calcium of 11.1 and 10.5 ( normal 8.7-10.3) and high PTH,intact of 124 (normal 15-65).  She is currently taking Vitamin D 2000 units daily.  Her Vitamin D level from 5/4/19 was 55.2.  She is severely short of breath today with exertion today.      ROS:  Constitutional: No weight loss  Cardiac: No palpitations or racing heart  Resp: shortness of breath which is extreme  Neuro: No numbness or tinging in feet  Endo: No heat or cold intolerance, no polyuria or polydipsia  All other systems were reviewed and were negative.    Past Medical History:  Patient Active Problem List    Diagnosis Date Noted   • Bronchitis 02/08/2017   • NSTEMI (non-ST elevated myocardial infarction) ([facility]) 02/07/2017   • Acute respiratory failure with hypoxia ([facility]) 02/06/2017   • COPD with acute exacerbation ([facility]) 02/06/2017   • Hypercalcemia 02/06/2017   • Elevated troponin 02/06/2017   • CAD (coronary artery disease) 02/06/2017   • DM (diabetes mellitus) ([facility]) 02/06/2017   • HLD (hyperlipidemia) 02/06/2017       Past Surgical History:  Past Surgical History:   Procedure Laterality Date   • OTHER CARDIAC SURGERY  2004    cabg x4v       Allergies:  Patient has no known allergies.    Social History:  Social History     Socioeconomic History   • Marital status:      Spouse name: Not on file   • Number of children: Not on file   • Years of education: Not on file   • Highest education level: Not on file   Occupational History   • Not on file   Social Needs   • Financial resource strain: Not on file   • Food insecurity:     Worry: Not on file     Inability: Not on file   • Transportation needs:     Medical: Not on file     Non-medical: Not on file   Tobacco Use   • Smoking status: Former  Smoker     Packs/day: 1.00     Years: 50.00     Pack years: 50.00     Types: Cigarettes     Last attempt to quit: 2016     Years since quittin.9   Substance and Sexual Activity   • Alcohol use: No   • Drug use: No   • Sexual activity: Not on file   Lifestyle   • Physical activity:     Days per week: Not on file     Minutes per session: Not on file   • Stress: Not on file   Relationships   • Social connections:     Talks on phone: Not on file     Gets together: Not on file     Attends [employer] service: Not on file     Active member of club or organization: Not on file     Attends meetings of clubs or organizations: Not on file     Relationship status: Not on file   • Intimate partner violence:     Fear of current or ex partner: Not on file     Emotionally abused: Not on file     Physically abused: Not on file     Forced sexual activity: Not on file   Other Topics Concern   • Not on file   Social History Narrative   • Not on file       Family History:  Family History   Problem Relation Age of Onset   • Lung Disease Mother    • Heart Disease Father        Medications:    Current Outpatient Medications:   •  furosemide (LASIX) 20 MG Tab, Take 20 mg by mouth 2 times a day., Disp: , Rfl:   •  vitamin D (CHOLECALCIFEROL) 1000 UNIT Tab, Take 1,000 Units by mouth 2 Times a Day., Disp: , Rfl:   •  Fluticasone-Umeclidin-Vilant (TRELEGY ELLIPTA) 100-62.5-25 MCG/INH AEROSOL POWDER, BREATH ACTIVATED, Inhale  by mouth every day., Disp: , Rfl:   •  cinacalcet (SENSIPAR) 30 MG Tab, Take 1 Tab by mouth every day., Disp: 90 Tab, Rfl: 3  •  aspirin EC 81 MG EC tablet, Take 1 Tab by mouth every day., Disp: 100 Tab, Rfl: 2  •  potassium chloride SA (KDUR) 20 MEQ Tab CR, Take 1 Tab by mouth every day., Disp: 60 Tab, Rfl: 2  •  metoprolol SR (TOPROL XL) 100 MG TABLET SR 24 HR, Take 100 mg by mouth every day., Disp: , Rfl:   •  atorvastatin (LIPITOR) 40 MG Tab, Take 40 mg by mouth every evening., Disp: , Rfl:   •  metformin  "(GLUCOPHAGE) 500 MG Tab, Take 500 mg by mouth 2 times a day, with meals., Disp: , Rfl:   •  albuterol (VENTOLIN HFA) 108 (90 BASE) MCG/ACT Aero Soln inhalation aerosol, Inhale 1 Puff by mouth every 6 hours as needed for Shortness of Breath., Disp: , Rfl:     Labs: Reviewed    Physical Examination:  Vital signs: /84   Pulse (!) 108   Ht 1.626 m (5' 4\")   Wt 72.6 kg (160 lb)   SpO2 90%   BMI 27.46 kg/m²  Body mass index is 27.46 kg/m². Patient's body mass index is 27.46 kg/m². Exercise and nutrition counseling were performed at this visit.  General: No apparent distress, cooperative, extremely short of breath barely able to talk  Eyes: No scleral icterus or discharge  ENMT: Normal on external inspection of nose, lips, normal thyroid exam  Neck: No abnormal masses on inspection  Resp: A lot of respiratory effort, extremely short of breath, clear to auscultation bilaterally   CVS: Regular rate and rhythm, S1 S2 normal, no murmur   Extremities: No edema  Abdomen: abdominal obesity present  Neuro: Alert and oriented  Skin: No rash  Psych: Normal mood and affect, intact memory and able to make informed decisions    Assessment and Plan:    1. Hyperparathyroidism (HCC)  Likely secondary to primary hyperparathyroidism.  Will obtain sestamibi scan to rule out parathyroid adenoma.  She is a very poor surgical candidate because of her severe COPD.  She is extremely short of breath even on rest and she can barely talk.  Start Sensipar 30 mg daily for hyperparathyroidism.  Advised to stay hydrated at all times    2. Hypercalcemia:  Due to hyperparathyroidism; plan as per above    3. Vit d def:  Stop all forms of vit d due to hypercalcemia.     Return in about 4 months (around 3/12/2020).    Thank you for allowing me to participate in the care of this patient.    Luis Carlos Freeman M.D.  11/12/19  This note was scribed by Oriana De Jesus RN CDE  CC:   Mary Arias D.O.    This note was created using voice recognition " software (Dragon). The accuracy of the dictation is limited by the abilities of the software. I have reviewed the note prior to signing, however some errors in grammar and context are still possible. If you have any questions related to this note please do not hesitate to contact our office.

## 2020-01-10 ENCOUNTER — APPOINTMENT (OUTPATIENT)
Dept: RADIOLOGY | Facility: MEDICAL CENTER | Age: 75
DRG: 871 | End: 2020-01-10
Attending: INTERNAL MEDICINE
Payer: MEDICARE

## 2020-01-10 ENCOUNTER — APPOINTMENT (OUTPATIENT)
Dept: RADIOLOGY | Facility: MEDICAL CENTER | Age: 75
DRG: 871 | End: 2020-01-10
Attending: EMERGENCY MEDICINE
Payer: MEDICARE

## 2020-01-10 ENCOUNTER — HOSPITAL ENCOUNTER (INPATIENT)
Facility: MEDICAL CENTER | Age: 75
LOS: 5 days | DRG: 871 | End: 2020-01-15
Attending: EMERGENCY MEDICINE | Admitting: INTERNAL MEDICINE
Payer: MEDICARE

## 2020-01-10 DIAGNOSIS — R65.21 SEPTIC SHOCK (HCC): ICD-10-CM

## 2020-01-10 DIAGNOSIS — J96.01 ACUTE RESPIRATORY FAILURE WITH HYPOXIA (HCC): ICD-10-CM

## 2020-01-10 DIAGNOSIS — A41.9 SEPTIC SHOCK (HCC): ICD-10-CM

## 2020-01-10 PROBLEM — J44.9 COPD (CHRONIC OBSTRUCTIVE PULMONARY DISEASE) (HCC): Status: ACTIVE | Noted: 2020-01-10

## 2020-01-10 PROBLEM — G93.41 SEPSIS WITH ENCEPHALOPATHY WITHOUT SEPTIC SHOCK (HCC): Status: ACTIVE | Noted: 2020-01-10

## 2020-01-10 PROBLEM — J18.9 COMMUNITY ACQUIRED PNEUMONIA: Status: ACTIVE | Noted: 2020-01-10

## 2020-01-10 PROBLEM — E87.29 HIGH ANION GAP METABOLIC ACIDOSIS: Status: ACTIVE | Noted: 2020-01-10

## 2020-01-10 PROBLEM — R65.20 SEPSIS WITH ENCEPHALOPATHY WITHOUT SEPTIC SHOCK (HCC): Status: ACTIVE | Noted: 2020-01-10

## 2020-01-10 PROBLEM — I50.9 CHF (CONGESTIVE HEART FAILURE) (HCC): Status: ACTIVE | Noted: 2020-01-10

## 2020-01-10 LAB
ALBUMIN SERPL BCP-MCNC: 3.7 G/DL (ref 3.2–4.9)
ALBUMIN/GLOB SERPL: 1.6 G/DL
ALP SERPL-CCNC: 96 U/L (ref 30–99)
ALT SERPL-CCNC: 18 U/L (ref 2–50)
AMPHET UR QL SCN: NEGATIVE
ANION GAP SERPL CALC-SCNC: 16 MMOL/L (ref 0–11.9)
ANISOCYTOSIS BLD QL SMEAR: ABNORMAL
APPEARANCE UR: ABNORMAL
AST SERPL-CCNC: 24 U/L (ref 12–45)
BACTERIA #/AREA URNS HPF: ABNORMAL /HPF
BARBITURATES UR QL SCN: NEGATIVE
BASOPHILS # BLD AUTO: 0.7 % (ref 0–1.8)
BASOPHILS # BLD: 0.11 K/UL (ref 0–0.12)
BENZODIAZ UR QL SCN: NEGATIVE
BILIRUB SERPL-MCNC: 0.9 MG/DL (ref 0.1–1.5)
BILIRUB UR QL STRIP.AUTO: NEGATIVE
BUN SERPL-MCNC: 19 MG/DL (ref 8–22)
BZE UR QL SCN: NEGATIVE
CALCIUM SERPL-MCNC: 9.2 MG/DL (ref 8.5–10.5)
CANNABINOIDS UR QL SCN: NEGATIVE
CHLORIDE SERPL-SCNC: 100 MMOL/L (ref 96–112)
CO2 SERPL-SCNC: 18 MMOL/L (ref 20–33)
COLOR UR: YELLOW
COMMENT 1642: NORMAL
CREAT SERPL-MCNC: 1.08 MG/DL (ref 0.5–1.4)
EKG IMPRESSION: NORMAL
EOSINOPHIL # BLD AUTO: 0.22 K/UL (ref 0–0.51)
EOSINOPHIL NFR BLD: 1.4 % (ref 0–6.9)
EPI CELLS #/AREA URNS HPF: NEGATIVE /HPF
ERYTHROCYTE [DISTWIDTH] IN BLOOD BY AUTOMATED COUNT: 50 FL (ref 35.9–50)
FLUAV RNA SPEC QL NAA+PROBE: NEGATIVE
FLUBV RNA SPEC QL NAA+PROBE: NEGATIVE
GLOBULIN SER CALC-MCNC: 2.3 G/DL (ref 1.9–3.5)
GLUCOSE SERPL-MCNC: 310 MG/DL (ref 65–99)
GLUCOSE UR STRIP.AUTO-MCNC: NEGATIVE MG/DL
HCT VFR BLD AUTO: 41.4 % (ref 37–47)
HGB BLD-MCNC: 12.3 G/DL (ref 12–16)
HYALINE CASTS #/AREA URNS LPF: ABNORMAL /LPF
IMM GRANULOCYTES # BLD AUTO: 0.16 K/UL (ref 0–0.11)
IMM GRANULOCYTES NFR BLD AUTO: 1 % (ref 0–0.9)
KETONES UR STRIP.AUTO-MCNC: NEGATIVE MG/DL
LACTATE BLD-SCNC: 6.5 MMOL/L (ref 0.5–2)
LEUKOCYTE ESTERASE UR QL STRIP.AUTO: ABNORMAL
LYMPHOCYTES # BLD AUTO: 3.17 K/UL (ref 1–4.8)
LYMPHOCYTES NFR BLD: 20.3 % (ref 22–41)
MCH RBC QN AUTO: 25.8 PG (ref 27–33)
MCHC RBC AUTO-ENTMCNC: 29.7 G/DL (ref 33.6–35)
MCV RBC AUTO: 86.8 FL (ref 81.4–97.8)
METHADONE UR QL SCN: NEGATIVE
MICRO URNS: ABNORMAL
MICROCYTES BLD QL SMEAR: ABNORMAL
MONOCYTES # BLD AUTO: 1.35 K/UL (ref 0–0.85)
MONOCYTES NFR BLD AUTO: 8.6 % (ref 0–13.4)
MORPHOLOGY BLD-IMP: NORMAL
NEUTROPHILS # BLD AUTO: 10.64 K/UL (ref 2–7.15)
NEUTROPHILS NFR BLD: 68 % (ref 44–72)
NITRITE UR QL STRIP.AUTO: NEGATIVE
NRBC # BLD AUTO: 0 K/UL
NRBC BLD-RTO: 0 /100 WBC
NT-PROBNP SERPL IA-MCNC: ABNORMAL PG/ML (ref 0–125)
OPIATES UR QL SCN: NEGATIVE
OVALOCYTES BLD QL SMEAR: NORMAL
OXYCODONE UR QL SCN: NEGATIVE
PCP UR QL SCN: NEGATIVE
PH UR STRIP.AUTO: 5.5 [PH] (ref 5–8)
PLATELET # BLD AUTO: 383 K/UL (ref 164–446)
PLATELET BLD QL SMEAR: NORMAL
PMV BLD AUTO: 10.3 FL (ref 9–12.9)
POIKILOCYTOSIS BLD QL SMEAR: NORMAL
POTASSIUM SERPL-SCNC: 4.3 MMOL/L (ref 3.6–5.5)
PROCALCITONIN SERPL-MCNC: <0.05 NG/ML
PROPOXYPH UR QL SCN: NEGATIVE
PROT SERPL-MCNC: 6 G/DL (ref 6–8.2)
PROT UR QL STRIP: 300 MG/DL
RBC # BLD AUTO: 4.77 M/UL (ref 4.2–5.4)
RBC # URNS HPF: ABNORMAL /HPF
RBC BLD AUTO: PRESENT
RBC UR QL AUTO: ABNORMAL
SODIUM SERPL-SCNC: 134 MMOL/L (ref 135–145)
SP GR UR STRIP.AUTO: 1.02
TRIGL SERPL-MCNC: 125 MG/DL (ref 0–149)
TROPONIN T SERPL-MCNC: 20 NG/L (ref 6–19)
UROBILINOGEN UR STRIP.AUTO-MCNC: 1 MG/DL
WBC # BLD AUTO: 15.7 K/UL (ref 4.8–10.8)
WBC #/AREA URNS HPF: ABNORMAL /HPF

## 2020-01-10 PROCEDURE — 700111 HCHG RX REV CODE 636 W/ 250 OVERRIDE (IP): Performed by: STUDENT IN AN ORGANIZED HEALTH CARE EDUCATION/TRAINING PROGRAM

## 2020-01-10 PROCEDURE — 99292 CRITICAL CARE ADDL 30 MIN: CPT | Mod: 25 | Performed by: INTERNAL MEDICINE

## 2020-01-10 PROCEDURE — 70450 CT HEAD/BRAIN W/O DYE: CPT

## 2020-01-10 PROCEDURE — 0BH17EZ INSERTION OF ENDOTRACHEAL AIRWAY INTO TRACHEA, VIA NATURAL OR ARTIFICIAL OPENING: ICD-10-PCS | Performed by: EMERGENCY MEDICINE

## 2020-01-10 PROCEDURE — B548ZZA ULTRASONOGRAPHY OF SUPERIOR VENA CAVA, GUIDANCE: ICD-10-PCS | Performed by: INTERNAL MEDICINE

## 2020-01-10 PROCEDURE — 02HV33Z INSERTION OF INFUSION DEVICE INTO SUPERIOR VENA CAVA, PERCUTANEOUS APPROACH: ICD-10-PCS | Performed by: INTERNAL MEDICINE

## 2020-01-10 PROCEDURE — 94002 VENT MGMT INPAT INIT DAY: CPT

## 2020-01-10 PROCEDURE — 96365 THER/PROPH/DIAG IV INF INIT: CPT

## 2020-01-10 PROCEDURE — 84478 ASSAY OF TRIGLYCERIDES: CPT

## 2020-01-10 PROCEDURE — 303105 HCHG CATHETER EXTRA

## 2020-01-10 PROCEDURE — 85025 COMPLETE CBC W/AUTO DIFF WBC: CPT

## 2020-01-10 PROCEDURE — 87086 URINE CULTURE/COLONY COUNT: CPT

## 2020-01-10 PROCEDURE — 93005 ELECTROCARDIOGRAM TRACING: CPT | Performed by: EMERGENCY MEDICINE

## 2020-01-10 PROCEDURE — 96366 THER/PROPH/DIAG IV INF ADDON: CPT

## 2020-01-10 PROCEDURE — 80307 DRUG TEST PRSMV CHEM ANLYZR: CPT

## 2020-01-10 PROCEDURE — 99291 CRITICAL CARE FIRST HOUR: CPT | Mod: 25 | Performed by: INTERNAL MEDICINE

## 2020-01-10 PROCEDURE — 96375 TX/PRO/DX INJ NEW DRUG ADDON: CPT

## 2020-01-10 PROCEDURE — 83605 ASSAY OF LACTIC ACID: CPT

## 2020-01-10 PROCEDURE — 5A1945Z RESPIRATORY VENTILATION, 24-96 CONSECUTIVE HOURS: ICD-10-PCS | Performed by: INTERNAL MEDICINE

## 2020-01-10 PROCEDURE — 84145 PROCALCITONIN (PCT): CPT

## 2020-01-10 PROCEDURE — 84484 ASSAY OF TROPONIN QUANT: CPT

## 2020-01-10 PROCEDURE — 87077 CULTURE AEROBIC IDENTIFY: CPT

## 2020-01-10 PROCEDURE — 83880 ASSAY OF NATRIURETIC PEPTIDE: CPT

## 2020-01-10 PROCEDURE — 31500 INSERT EMERGENCY AIRWAY: CPT

## 2020-01-10 PROCEDURE — 700105 HCHG RX REV CODE 258: Performed by: STUDENT IN AN ORGANIZED HEALTH CARE EDUCATION/TRAINING PROGRAM

## 2020-01-10 PROCEDURE — 51702 INSERT TEMP BLADDER CATH: CPT

## 2020-01-10 PROCEDURE — 87502 INFLUENZA DNA AMP PROBE: CPT

## 2020-01-10 PROCEDURE — 304538 HCHG NG TUBE

## 2020-01-10 PROCEDURE — 87186 SC STD MICRODIL/AGAR DIL: CPT

## 2020-01-10 PROCEDURE — 700101 HCHG RX REV CODE 250: Performed by: STUDENT IN AN ORGANIZED HEALTH CARE EDUCATION/TRAINING PROGRAM

## 2020-01-10 PROCEDURE — 87040 BLOOD CULTURE FOR BACTERIA: CPT | Mod: 91

## 2020-01-10 PROCEDURE — 36415 COLL VENOUS BLD VENIPUNCTURE: CPT

## 2020-01-10 PROCEDURE — 80053 COMPREHEN METABOLIC PANEL: CPT

## 2020-01-10 PROCEDURE — 71045 X-RAY EXAM CHEST 1 VIEW: CPT

## 2020-01-10 PROCEDURE — 36556 INSERT NON-TUNNEL CV CATH: CPT | Mod: RT | Performed by: INTERNAL MEDICINE

## 2020-01-10 PROCEDURE — 770022 HCHG ROOM/CARE - ICU (200)

## 2020-01-10 PROCEDURE — 81001 URINALYSIS AUTO W/SCOPE: CPT

## 2020-01-10 PROCEDURE — 99291 CRITICAL CARE FIRST HOUR: CPT

## 2020-01-10 PROCEDURE — 96368 THER/DIAG CONCURRENT INF: CPT

## 2020-01-10 PROCEDURE — 700111 HCHG RX REV CODE 636 W/ 250 OVERRIDE (IP): Performed by: EMERGENCY MEDICINE

## 2020-01-10 PROCEDURE — 700111 HCHG RX REV CODE 636 W/ 250 OVERRIDE (IP)

## 2020-01-10 RX ORDER — AMOXICILLIN 250 MG
2 CAPSULE ORAL 2 TIMES DAILY
Status: DISCONTINUED | OUTPATIENT
Start: 2020-01-10 | End: 2020-01-13

## 2020-01-10 RX ORDER — POTASSIUM CHLORIDE 20 MEQ/1
20 TABLET, EXTENDED RELEASE ORAL DAILY
COMMUNITY

## 2020-01-10 RX ORDER — POLYETHYLENE GLYCOL 3350 17 G/17G
1 POWDER, FOR SOLUTION ORAL
Status: DISCONTINUED | OUTPATIENT
Start: 2020-01-10 | End: 2020-01-13

## 2020-01-10 RX ORDER — SODIUM CHLORIDE 9 MG/ML
1000 INJECTION, SOLUTION INTRAVENOUS ONCE
Status: COMPLETED | OUTPATIENT
Start: 2020-01-10 | End: 2020-01-10

## 2020-01-10 RX ORDER — BISACODYL 10 MG
10 SUPPOSITORY, RECTAL RECTAL
Status: DISCONTINUED | OUTPATIENT
Start: 2020-01-10 | End: 2020-01-13

## 2020-01-10 RX ORDER — ATORVASTATIN CALCIUM 20 MG/1
40 TABLET, FILM COATED ORAL NIGHTLY
Status: DISCONTINUED | OUTPATIENT
Start: 2020-01-10 | End: 2020-01-10

## 2020-01-10 RX ORDER — MIDAZOLAM HYDROCHLORIDE 1 MG/ML
INJECTION INTRAMUSCULAR; INTRAVENOUS
Status: DISPENSED
Start: 2020-01-10 | End: 2020-01-11

## 2020-01-10 RX ORDER — SODIUM CHLORIDE 9 MG/ML
INJECTION, SOLUTION INTRAVENOUS
Status: ACTIVE
Start: 2020-01-10 | End: 2020-01-11

## 2020-01-10 RX ORDER — CINACALCET 30 MG/1
30 TABLET, FILM COATED ORAL DAILY
Status: DISCONTINUED | OUTPATIENT
Start: 2020-01-11 | End: 2020-01-11

## 2020-01-10 RX ORDER — IPRATROPIUM BROMIDE AND ALBUTEROL SULFATE 2.5; .5 MG/3ML; MG/3ML
3 SOLUTION RESPIRATORY (INHALATION)
Status: DISCONTINUED | OUTPATIENT
Start: 2020-01-10 | End: 2020-01-13

## 2020-01-10 RX ORDER — METHYLPREDNISOLONE SODIUM SUCCINATE 125 MG/2ML
125 INJECTION, POWDER, LYOPHILIZED, FOR SOLUTION INTRAMUSCULAR; INTRAVENOUS ONCE
Status: DISCONTINUED | OUTPATIENT
Start: 2020-01-10 | End: 2020-01-10

## 2020-01-10 RX ORDER — NOREPINEPHRINE BITARTRATE 1 MG/ML
INJECTION, SOLUTION INTRAVENOUS
Status: DISPENSED
Start: 2020-01-10 | End: 2020-01-11

## 2020-01-10 RX ORDER — CINACALCET 30 MG/1
30 TABLET, FILM COATED ORAL DAILY
COMMUNITY

## 2020-01-10 RX ORDER — HEPARIN SODIUM 5000 [USP'U]/ML
5000 INJECTION, SOLUTION INTRAVENOUS; SUBCUTANEOUS EVERY 8 HOURS
Status: DISCONTINUED | OUTPATIENT
Start: 2020-01-10 | End: 2020-01-11

## 2020-01-10 RX ORDER — FAMOTIDINE 20 MG/1
20 TABLET, FILM COATED ORAL EVERY 12 HOURS
Status: DISCONTINUED | OUTPATIENT
Start: 2020-01-10 | End: 2020-01-12

## 2020-01-10 RX ORDER — MIDAZOLAM HYDROCHLORIDE 1 MG/ML
2 INJECTION INTRAMUSCULAR; INTRAVENOUS ONCE
Status: COMPLETED | OUTPATIENT
Start: 2020-01-10 | End: 2020-01-10

## 2020-01-10 RX ADMIN — SODIUM CHLORIDE 1000 ML: 9 INJECTION, SOLUTION INTRAVENOUS at 21:22

## 2020-01-10 RX ADMIN — FENTANYL CITRATE 100 MCG: 50 INJECTION, SOLUTION INTRAMUSCULAR; INTRAVENOUS at 20:11

## 2020-01-10 RX ADMIN — FENTANYL CITRATE 50 MCG: 50 INJECTION, SOLUTION INTRAMUSCULAR; INTRAVENOUS at 19:30

## 2020-01-10 RX ADMIN — PROPOFOL 15 MCG/KG/MIN: 10 INJECTION, EMULSION INTRAVENOUS at 21:57

## 2020-01-10 RX ADMIN — NOREPINEPHRINE BITARTRATE 6 MCG/MIN: 1 INJECTION INTRAVENOUS at 23:35

## 2020-01-10 RX ADMIN — FENTANYL CITRATE 150 MCG/HR: 50 INJECTION, SOLUTION INTRAMUSCULAR; INTRAVENOUS at 21:50

## 2020-01-10 RX ADMIN — PROPOFOL 100 MG: 10 INJECTION, EMULSION INTRAVENOUS at 19:15

## 2020-01-10 RX ADMIN — PROPOFOL 5 MCG/KG/MIN: 10 INJECTION, EMULSION INTRAVENOUS at 18:43

## 2020-01-10 RX ADMIN — FAMOTIDINE 20 MG: 10 INJECTION INTRAVENOUS at 23:12

## 2020-01-10 RX ADMIN — MIDAZOLAM HYDROCHLORIDE 2 MG: 1 INJECTION, SOLUTION INTRAMUSCULAR; INTRAVENOUS at 20:21

## 2020-01-10 RX ADMIN — CEFTRIAXONE SODIUM 2 G: 2 INJECTION, POWDER, FOR SOLUTION INTRAMUSCULAR; INTRAVENOUS at 23:12

## 2020-01-10 RX ADMIN — HEPARIN SODIUM 5000 UNITS: 5000 INJECTION, SOLUTION INTRAVENOUS; SUBCUTANEOUS at 23:12

## 2020-01-11 ENCOUNTER — APPOINTMENT (OUTPATIENT)
Dept: RADIOLOGY | Facility: MEDICAL CENTER | Age: 75
DRG: 871 | End: 2020-01-11
Attending: INTERNAL MEDICINE
Payer: MEDICARE

## 2020-01-11 ENCOUNTER — APPOINTMENT (OUTPATIENT)
Dept: CARDIOLOGY | Facility: MEDICAL CENTER | Age: 75
DRG: 871 | End: 2020-01-11
Attending: INTERNAL MEDICINE
Payer: MEDICARE

## 2020-01-11 ENCOUNTER — APPOINTMENT (OUTPATIENT)
Dept: RADIOLOGY | Facility: MEDICAL CENTER | Age: 75
DRG: 871 | End: 2020-01-11
Attending: STUDENT IN AN ORGANIZED HEALTH CARE EDUCATION/TRAINING PROGRAM
Payer: MEDICARE

## 2020-01-11 PROBLEM — R65.21 SEPTIC SHOCK (HCC): Status: ACTIVE | Noted: 2020-01-10

## 2020-01-11 PROBLEM — N18.30 CKD (CHRONIC KIDNEY DISEASE) STAGE 3, GFR 30-59 ML/MIN: Status: ACTIVE | Noted: 2020-01-11

## 2020-01-11 PROBLEM — R06.00 DYSPNEA: Status: RESOLVED | Noted: 2020-01-11 | Resolved: 2020-01-11

## 2020-01-11 PROBLEM — R06.00 DYSPNEA: Status: ACTIVE | Noted: 2020-01-11

## 2020-01-11 PROBLEM — G93.40 ENCEPHALOPATHY ACUTE: Status: ACTIVE | Noted: 2020-01-11

## 2020-01-11 LAB
ACTION RANGE TRIGGERED IACRT: NO
ALBUMIN SERPL BCP-MCNC: 3.4 G/DL (ref 3.2–4.9)
ALBUMIN/GLOB SERPL: 1.6 G/DL
ALP SERPL-CCNC: 72 U/L (ref 30–99)
ALT SERPL-CCNC: 17 U/L (ref 2–50)
ANION GAP SERPL CALC-SCNC: 9 MMOL/L (ref 0–11.9)
APPEARANCE FLD: NORMAL
AST SERPL-CCNC: 15 U/L (ref 12–45)
BASE EXCESS BLDA CALC-SCNC: -3 MMOL/L (ref -4–3)
BASOPHILS # BLD AUTO: 0.4 % (ref 0–1.8)
BASOPHILS # BLD: 0.05 K/UL (ref 0–0.12)
BILIRUB SERPL-MCNC: 0.5 MG/DL (ref 0.1–1.5)
BODY FLD TYPE: NORMAL
BODY TEMPERATURE: ABNORMAL DEGREES
BUN SERPL-MCNC: 19 MG/DL (ref 8–22)
CALCIUM SERPL-MCNC: 8.8 MG/DL (ref 8.5–10.5)
CHLORIDE SERPL-SCNC: 109 MMOL/L (ref 96–112)
CHOLEST SERPL-MCNC: 123 MG/DL (ref 100–199)
CO2 BLDA-SCNC: 25 MMOL/L (ref 20–33)
CO2 SERPL-SCNC: 21 MMOL/L (ref 20–33)
COLOR FLD: NORMAL
CREAT SERPL-MCNC: 0.83 MG/DL (ref 0.5–1.4)
CSF COMMENTS 1658: NORMAL
EOSINOPHIL # BLD AUTO: 0.03 K/UL (ref 0–0.51)
EOSINOPHIL NFR BLD: 0.2 % (ref 0–6.9)
ERYTHROCYTE [DISTWIDTH] IN BLOOD BY AUTOMATED COUNT: 51.8 FL (ref 35.9–50)
GLOBULIN SER CALC-MCNC: 2.1 G/DL (ref 1.9–3.5)
GLUCOSE BLD-MCNC: 106 MG/DL (ref 65–99)
GLUCOSE BLD-MCNC: 125 MG/DL (ref 65–99)
GLUCOSE BLD-MCNC: 143 MG/DL (ref 65–99)
GLUCOSE BLD-MCNC: 94 MG/DL (ref 65–99)
GLUCOSE SERPL-MCNC: 113 MG/DL (ref 65–99)
GRAM STN SPEC: NORMAL
HCO3 BLDA-SCNC: 23.7 MMOL/L (ref 17–25)
HCT VFR BLD AUTO: 36.9 % (ref 37–47)
HDLC SERPL-MCNC: 32 MG/DL
HGB BLD-MCNC: 10.9 G/DL (ref 12–16)
HOROWITZ INDEX BLDA+IHG-RTO: 125 MM[HG]
IMM GRANULOCYTES # BLD AUTO: 0.1 K/UL (ref 0–0.11)
IMM GRANULOCYTES NFR BLD AUTO: 0.8 % (ref 0–0.9)
INST. QUALIFIED PATIENT IIQPT: YES
LACTATE BLD-SCNC: 0.9 MMOL/L (ref 0.5–2)
LACTATE BLD-SCNC: 1.1 MMOL/L (ref 0.5–2)
LDLC SERPL CALC-MCNC: 67 MG/DL
LV EJECT FRACT  99904: 30
LV EJECT FRACT MOD 2C 99903: 38.76
LV EJECT FRACT MOD 4C 99902: 35.57
LV EJECT FRACT MOD BP 99901: 42.77
LYMPHOCYTES # BLD AUTO: 1.39 K/UL (ref 1–4.8)
LYMPHOCYTES NFR BLD: 10.9 % (ref 22–41)
MAGNESIUM SERPL-MCNC: 1.8 MG/DL (ref 1.5–2.5)
MCH RBC QN AUTO: 25.9 PG (ref 27–33)
MCHC RBC AUTO-ENTMCNC: 29.5 G/DL (ref 33.6–35)
MCV RBC AUTO: 87.6 FL (ref 81.4–97.8)
MONOCYTES # BLD AUTO: 1.4 K/UL (ref 0–0.85)
MONOCYTES NFR BLD AUTO: 10.9 % (ref 0–13.4)
NEUTROPHILS # BLD AUTO: 9.83 K/UL (ref 2–7.15)
NEUTROPHILS NFR BLD: 76.8 % (ref 44–72)
NRBC # BLD AUTO: 0 K/UL
NRBC BLD-RTO: 0 /100 WBC
O2/TOTAL GAS SETTING VFR VENT: 60 %
PCO2 BLDA: 46.1 MMHG (ref 26–37)
PCO2 TEMP ADJ BLDA: 46.5 MMHG (ref 26–37)
PH BLDA: 7.32 [PH] (ref 7.4–7.5)
PH TEMP ADJ BLDA: 7.32 [PH] (ref 7.4–7.5)
PHOSPHATE SERPL-MCNC: 4.1 MG/DL (ref 2.5–4.5)
PLATELET # BLD AUTO: 338 K/UL (ref 164–446)
PMV BLD AUTO: 10.3 FL (ref 9–12.9)
PO2 BLDA: 75 MMHG (ref 64–87)
PO2 TEMP ADJ BLDA: 76 MMHG (ref 64–87)
POTASSIUM SERPL-SCNC: 3.7 MMOL/L (ref 3.6–5.5)
PROT SERPL-MCNC: 5.5 G/DL (ref 6–8.2)
RBC # BLD AUTO: 4.21 M/UL (ref 4.2–5.4)
SAO2 % BLDA: 94 % (ref 93–99)
SIGNIFICANT IND 70042: NORMAL
SITE SITE: NORMAL
SODIUM SERPL-SCNC: 139 MMOL/L (ref 135–145)
SOURCE SOURCE: NORMAL
SPECIMEN DRAWN FROM PATIENT: ABNORMAL
TRIGL SERPL-MCNC: 118 MG/DL (ref 0–149)
TROPONIN T SERPL-MCNC: 29 NG/L (ref 6–19)
WBC # BLD AUTO: 12.8 K/UL (ref 4.8–10.8)
WBC # FLD: NORMAL CELLS/UL

## 2020-01-11 PROCEDURE — A9270 NON-COVERED ITEM OR SERVICE: HCPCS | Performed by: STUDENT IN AN ORGANIZED HEALTH CARE EDUCATION/TRAINING PROGRAM

## 2020-01-11 PROCEDURE — 770022 HCHG ROOM/CARE - ICU (200)

## 2020-01-11 PROCEDURE — 700105 HCHG RX REV CODE 258: Performed by: INTERNAL MEDICINE

## 2020-01-11 PROCEDURE — 83735 ASSAY OF MAGNESIUM: CPT

## 2020-01-11 PROCEDURE — 700117 HCHG RX CONTRAST REV CODE 255: Performed by: INTERNAL MEDICINE

## 2020-01-11 PROCEDURE — 82962 GLUCOSE BLOOD TEST: CPT | Mod: 91

## 2020-01-11 PROCEDURE — 700102 HCHG RX REV CODE 250 W/ 637 OVERRIDE(OP): Performed by: INTERNAL MEDICINE

## 2020-01-11 PROCEDURE — C1751 CATH, INF, PER/CENT/MIDLINE: HCPCS

## 2020-01-11 PROCEDURE — 85025 COMPLETE CBC W/AUTO DIFF WBC: CPT

## 2020-01-11 PROCEDURE — 700105 HCHG RX REV CODE 258: Performed by: STUDENT IN AN ORGANIZED HEALTH CARE EDUCATION/TRAINING PROGRAM

## 2020-01-11 PROCEDURE — 94003 VENT MGMT INPAT SUBQ DAY: CPT

## 2020-01-11 PROCEDURE — 700111 HCHG RX REV CODE 636 W/ 250 OVERRIDE (IP): Performed by: STUDENT IN AN ORGANIZED HEALTH CARE EDUCATION/TRAINING PROGRAM

## 2020-01-11 PROCEDURE — A9270 NON-COVERED ITEM OR SERVICE: HCPCS | Performed by: INTERNAL MEDICINE

## 2020-01-11 PROCEDURE — 700102 HCHG RX REV CODE 250 W/ 637 OVERRIDE(OP): Performed by: STUDENT IN AN ORGANIZED HEALTH CARE EDUCATION/TRAINING PROGRAM

## 2020-01-11 PROCEDURE — 84484 ASSAY OF TROPONIN QUANT: CPT

## 2020-01-11 PROCEDURE — 83605 ASSAY OF LACTIC ACID: CPT | Mod: 91

## 2020-01-11 PROCEDURE — 89051 BODY FLUID CELL COUNT: CPT

## 2020-01-11 PROCEDURE — 93306 TTE W/DOPPLER COMPLETE: CPT

## 2020-01-11 PROCEDURE — 80061 LIPID PANEL: CPT

## 2020-01-11 PROCEDURE — 0B9D7ZX DRAINAGE OF RIGHT MIDDLE LUNG LOBE, VIA NATURAL OR ARTIFICIAL OPENING, DIAGNOSTIC: ICD-10-PCS | Performed by: INTERNAL MEDICINE

## 2020-01-11 PROCEDURE — 700111 HCHG RX REV CODE 636 W/ 250 OVERRIDE (IP): Performed by: INTERNAL MEDICINE

## 2020-01-11 PROCEDURE — 87070 CULTURE OTHR SPECIMN AEROBIC: CPT

## 2020-01-11 PROCEDURE — 31645 BRNCHSC W/THER ASPIR 1ST: CPT | Performed by: INTERNAL MEDICINE

## 2020-01-11 PROCEDURE — 93306 TTE W/DOPPLER COMPLETE: CPT | Mod: 26 | Performed by: INTERNAL MEDICINE

## 2020-01-11 PROCEDURE — 36600 WITHDRAWAL OF ARTERIAL BLOOD: CPT

## 2020-01-11 PROCEDURE — 94640 AIRWAY INHALATION TREATMENT: CPT

## 2020-01-11 PROCEDURE — 84100 ASSAY OF PHOSPHORUS: CPT

## 2020-01-11 PROCEDURE — 87205 SMEAR GRAM STAIN: CPT

## 2020-01-11 PROCEDURE — 31624 DX BRONCHOSCOPE/LAVAGE: CPT | Performed by: INTERNAL MEDICINE

## 2020-01-11 PROCEDURE — 302978 HCHG BRONCHOSCOPY-DIAGNOSTIC

## 2020-01-11 PROCEDURE — 82803 BLOOD GASES ANY COMBINATION: CPT

## 2020-01-11 PROCEDURE — 80053 COMPREHEN METABOLIC PANEL: CPT

## 2020-01-11 PROCEDURE — 99291 CRITICAL CARE FIRST HOUR: CPT | Mod: 25 | Performed by: INTERNAL MEDICINE

## 2020-01-11 PROCEDURE — 36556 INSERT NON-TUNNEL CV CATH: CPT

## 2020-01-11 PROCEDURE — 99292 CRITICAL CARE ADDL 30 MIN: CPT | Mod: 25 | Performed by: INTERNAL MEDICINE

## 2020-01-11 PROCEDURE — 700101 HCHG RX REV CODE 250: Performed by: STUDENT IN AN ORGANIZED HEALTH CARE EDUCATION/TRAINING PROGRAM

## 2020-01-11 RX ORDER — POTASSIUM CHLORIDE 14.9 MG/ML
20 INJECTION INTRAVENOUS ONCE
Status: COMPLETED | OUTPATIENT
Start: 2020-01-11 | End: 2020-01-11

## 2020-01-11 RX ORDER — AZITHROMYCIN 250 MG/1
500 TABLET, FILM COATED ORAL EVERY EVENING
Status: COMPLETED | OUTPATIENT
Start: 2020-01-11 | End: 2020-01-12

## 2020-01-11 RX ORDER — FUROSEMIDE 10 MG/ML
40 INJECTION INTRAMUSCULAR; INTRAVENOUS
Status: DISCONTINUED | OUTPATIENT
Start: 2020-01-11 | End: 2020-01-12

## 2020-01-11 RX ORDER — ACETAMINOPHEN 325 MG/1
650 TABLET ORAL EVERY 4 HOURS PRN
Status: DISCONTINUED | OUTPATIENT
Start: 2020-01-11 | End: 2020-01-13

## 2020-01-11 RX ORDER — MAGNESIUM SULFATE HEPTAHYDRATE 40 MG/ML
2 INJECTION, SOLUTION INTRAVENOUS ONCE
Status: COMPLETED | OUTPATIENT
Start: 2020-01-11 | End: 2020-01-11

## 2020-01-11 RX ORDER — ONDANSETRON 2 MG/ML
INJECTION INTRAMUSCULAR; INTRAVENOUS
Status: ACTIVE
Start: 2020-01-11 | End: 2020-01-12

## 2020-01-11 RX ORDER — SODIUM CHLORIDE 9 MG/ML
1000 INJECTION, SOLUTION INTRAVENOUS ONCE
Status: COMPLETED | OUTPATIENT
Start: 2020-01-11 | End: 2020-01-11

## 2020-01-11 RX ORDER — ONDANSETRON 2 MG/ML
4 INJECTION INTRAMUSCULAR; INTRAVENOUS EVERY 4 HOURS PRN
Status: DISCONTINUED | OUTPATIENT
Start: 2020-01-11 | End: 2020-01-15 | Stop reason: HOSPADM

## 2020-01-11 RX ADMIN — FAMOTIDINE 20 MG: 20 TABLET ORAL at 17:17

## 2020-01-11 RX ADMIN — HUMAN ALBUMIN MICROSPHERES AND PERFLUTREN 3 ML: 10; .22 INJECTION, SOLUTION INTRAVENOUS at 10:03

## 2020-01-11 RX ADMIN — PROPOFOL 10 MCG/KG/MIN: 10 INJECTION, EMULSION INTRAVENOUS at 14:03

## 2020-01-11 RX ADMIN — IPRATROPIUM BROMIDE AND ALBUTEROL SULFATE 3 ML: .5; 3 SOLUTION RESPIRATORY (INHALATION) at 11:23

## 2020-01-11 RX ADMIN — FUROSEMIDE 40 MG: 10 INJECTION, SOLUTION INTRAMUSCULAR; INTRAVENOUS at 11:03

## 2020-01-11 RX ADMIN — FENTANYL CITRATE 150 MCG/HR: 50 INJECTION, SOLUTION INTRAMUSCULAR; INTRAVENOUS at 19:37

## 2020-01-11 RX ADMIN — FAMOTIDINE 20 MG: 10 INJECTION INTRAVENOUS at 04:52

## 2020-01-11 RX ADMIN — SENNOSIDES AND DOCUSATE SODIUM 2 TABLET: 8.6; 5 TABLET ORAL at 17:16

## 2020-01-11 RX ADMIN — HEPARIN SODIUM 5000 UNITS: 5000 INJECTION, SOLUTION INTRAVENOUS; SUBCUTANEOUS at 04:53

## 2020-01-11 RX ADMIN — IPRATROPIUM BROMIDE AND ALBUTEROL SULFATE 3 ML: .5; 3 SOLUTION RESPIRATORY (INHALATION) at 15:55

## 2020-01-11 RX ADMIN — IPRATROPIUM BROMIDE AND ALBUTEROL SULFATE 3 ML: .5; 3 SOLUTION RESPIRATORY (INHALATION) at 18:59

## 2020-01-11 RX ADMIN — SODIUM CHLORIDE 1000 ML: 9 INJECTION, SOLUTION INTRAVENOUS at 00:37

## 2020-01-11 RX ADMIN — ENOXAPARIN SODIUM 40 MG: 100 INJECTION SUBCUTANEOUS at 15:14

## 2020-01-11 RX ADMIN — POTASSIUM CHLORIDE 20 MEQ: 14.9 INJECTION, SOLUTION INTRAVENOUS at 07:52

## 2020-01-11 RX ADMIN — AZITHROMYCIN MONOHYDRATE 500 MG: 500 INJECTION, POWDER, LYOPHILIZED, FOR SOLUTION INTRAVENOUS at 00:12

## 2020-01-11 RX ADMIN — CEFTRIAXONE SODIUM 2 G: 2 INJECTION, POWDER, FOR SOLUTION INTRAMUSCULAR; INTRAVENOUS at 20:46

## 2020-01-11 RX ADMIN — FENTANYL CITRATE 200 MCG/HR: 50 INJECTION, SOLUTION INTRAMUSCULAR; INTRAVENOUS at 07:07

## 2020-01-11 RX ADMIN — IPRATROPIUM BROMIDE AND ALBUTEROL SULFATE 3 ML: .5; 3 SOLUTION RESPIRATORY (INHALATION) at 06:28

## 2020-01-11 RX ADMIN — IPRATROPIUM BROMIDE AND ALBUTEROL SULFATE 3 ML: .5; 3 SOLUTION RESPIRATORY (INHALATION) at 23:21

## 2020-01-11 RX ADMIN — AZITHROMYCIN 500 MG: 250 TABLET, FILM COATED ORAL at 17:16

## 2020-01-11 RX ADMIN — ONDANSETRON 4 MG: 2 INJECTION INTRAMUSCULAR; INTRAVENOUS at 17:15

## 2020-01-11 RX ADMIN — FUROSEMIDE 40 MG: 10 INJECTION, SOLUTION INTRAMUSCULAR; INTRAVENOUS at 16:43

## 2020-01-11 RX ADMIN — ACETAMINOPHEN 650 MG: 325 TABLET, FILM COATED ORAL at 15:14

## 2020-01-11 RX ADMIN — FENTANYL CITRATE 100 MCG: 0.05 INJECTION, SOLUTION INTRAMUSCULAR; INTRAVENOUS at 01:37

## 2020-01-11 RX ADMIN — PROPOFOL 30 MCG/KG/MIN: 10 INJECTION, EMULSION INTRAVENOUS at 03:24

## 2020-01-11 RX ADMIN — IPRATROPIUM BROMIDE AND ALBUTEROL SULFATE 3 ML: .5; 3 SOLUTION RESPIRATORY (INHALATION) at 02:39

## 2020-01-11 RX ADMIN — MAGNESIUM SULFATE 2 G: 2 INJECTION INTRAVENOUS at 05:57

## 2020-01-11 RX ADMIN — FENTANYL CITRATE 50 MCG: 0.05 INJECTION, SOLUTION INTRAMUSCULAR; INTRAVENOUS at 01:16

## 2020-01-11 NOTE — ASSESSMENT & PLAN NOTE
No ASA, statin on home meds list  Cardiology evaluation, EF less than half year was 2 years ago  Conservative management per cardiology

## 2020-01-11 NOTE — ASSESSMENT & PLAN NOTE
Assessment: Patient presented with relatively rapid onset of acute hypoxic respiratory failure. Does not appear to be sufficient pulmonary edema to explain her respiratory failure and confusion. BNP was elevated however IVC collapsible and not clinically volume overloaded without edema or JVD. Potentially secondary to a community-acquired pneumonia, she was recently hospitalized and this is somewhat at risk for resistant organisms. S/P bronchoscopy, BAL sent for culture and sensitivity. Somewhat less likely could be secondary to COPD exacerbation, however wheezing is not a prominent feature of her presentation although minimally present and this does not usually present so quickly.  Bedside ultrasound was used to assess for likelihood of pulmonary embolism, right ventricle is roughly normal in size and function and IVC is collapsible.  -Patient was intubated in the field  -Now extubated, near home O2 requirements  - Patient was evaluated by PT/OT on 1/15 recommending DC home.    Plan:  -Home with Follow up PCP  -IS, pulmonary toilet  -Ambulate, up to chair for meals

## 2020-01-11 NOTE — ASSESSMENT & PLAN NOTE
Component of acute on chronic, patient on O2 3-4 L/min by nasal cannula at home  Secondary to septic shock from pna, improved  Suspect significant cardiac component with poor ef - 30%, down from 70% 2 years ago  Initial events came on fairly suddenly, chest x-ray looks more like pulmonary edema  Also suspect a component of COPD contributing  Intubated 1/11  Extubated 1/12  Rt/o2 protocols  Incentive spirometry  Mobilize

## 2020-01-11 NOTE — PROCEDURES
Central Line Insertion  Date/Time: 1/10/2020 10:50 PM  Performed by: Shanta Campo M.D.  Authorized by: Shanta Campo M.D.     Consent:     Consent obtained:  Emergent situation  Pre-procedure details:     Hand hygiene: Hand hygiene performed prior to insertion      Sterile barrier technique: All elements of maximal sterile technique followed      Skin preparation:  ChloraPrep    Skin preparation agent: Skin preparation agent completely dried prior to procedure    Sedation:     Sedation type:  None  Anesthesia:     Anesthesia method:  Local infiltration    Local anesthetic:  Lidocaine 1% w/o epi  Procedure details:     Location:  R internal jugular    Patient position:  Trendelenburg    Procedural supplies:  Triple lumen    Catheter size:  7 Fr    Landmarks identified: yes      Ultrasound guidance: yes      Sterile ultrasound techniques: Sterile gel and sterile probe covers were used      Number of attempts:  1    Successful placement: yes    Post-procedure details:     Post-procedure:  Dressing applied and line sutured    Assessment:  Blood return through all ports, no pneumothorax on x-ray, placement verified by x-ray and free fluid flow    Patient tolerance of procedure:  Tolerated well, no immediate complications

## 2020-01-11 NOTE — ASSESSMENT & PLAN NOTE
Normal EF 2017, 70%  Initially normal EF on bedside TTE, then after fluids EF appeared about 30%  Formal TTE reveals EF 30% with moderate MR  Cardiology consultation noted, Cards to follow-up as an outpatient

## 2020-01-11 NOTE — H&P
Internal Medicine Admitting History and Physical    Note Author: Sharad Castillo M.D.       Name Lacey Her 1945   Age/Sex 74 y.o. female   MRN [id number]   Code Status Full     After 5PM or if no immediate response to page, please call for cross-coverage  Attending/Team: Dr. Jahaira Castillo(Resident See Patient List for primary contact information  Call [phone] to page        Chief Complaint:   Acute respiratory failure, shortness of breath    HPI:  Ms. Her is a 74-year-old female with a past medical history significant for congestive heart failure with unknown ejection fraction, hyperparathyroidism, NSTEMI s/p CABG, and COPD who presented via EMS after acute onset shortness of breath.  She presented to the hospital in respiratory distress and was ultimately intubated as she was growing confused and unable to protect her airway.  Notably she was recently hospitalized at [facility], although we do not have exact details, per EMS she was admitted for heart failure exacerbation.  She is unable to give any history and there is no family available at the time of the interview.    In the emergency department she was initially hemodynamically stable and intubated, however upon initiation of propofol she became hypotensive requiring a 1 L bolus which caused improvement in her hypotension.  Her propofol was down titrated and she was started on fentanyl with good response.    Review of Systems   Unable to perform ROS: Acuity of condition             Past Medical History (Chronic medical problem, known complications and current treatment)    CHF - last EF here 70%, gets most of her treatment at [facility]  COPD - severe, unknown if she is on home O2  Hyperparathyroidism - poor surgical candidate, on Sensipar    Past Surgical History:  Past Surgical History:   Procedure Laterality Date   • OTHER CARDIAC SURGERY      cabg x4v       Current Outpatient Medications:  Home Medications     Reviewed by  Mary Shore PhT (Pharmacy Tech) on 01/10/20 at 2133  Med List Status: Partial   Medication Last Dose Status   albuterol (VENTOLIN HFA) 108 (90 BASE) MCG/ACT Aero Soln inhalation aerosol UNK Active   cinacalcet (SENSIPAR) 30 MG Tab UNK Active   Fluticasone-Umeclidin-Vilant (TRELEGY ELLIPTA) 100-62.5-25 MCG/INH AEROSOL POWDER, BREATH ACTIVATED UNK Active   furosemide (LASIX) 20 MG Tab UNK Active   metformin (GLUCOPHAGE) 500 MG Tab UNK Active   metoprolol SR (TOPROL XL) 100 MG TABLET SR 24 HR UNK Active   potassium chloride SA (KDUR) 20 MEQ Tab CR UNK Active                Medication Allergy/Sensitivities:  No Known Allergies      Family History (mandatory)   Family History   Problem Relation Age of Onset   • Lung Disease Mother    • Heart Disease Father        Social History (mandatory)   Social History     Socioeconomic History   • Marital status:      Spouse name: Not on file   • Number of children: Not on file   • Years of education: Not on file   • Highest education level: Not on file   Occupational History   • Not on file   Social Needs   • Financial resource strain: Not on file   • Food insecurity:     Worry: Not on file     Inability: Not on file   • Transportation needs:     Medical: Not on file     Non-medical: Not on file   Tobacco Use   • Smoking status: Former Smoker     Packs/day: 1.00     Years: 50.00     Pack years: 50.00     Types: Cigarettes     Last attempt to quit: 11/25/2016     Years since quitting: 3.1   Substance and Sexual Activity   • Alcohol use: No   • Drug use: No   • Sexual activity: Not on file   Lifestyle   • Physical activity:     Days per week: Not on file     Minutes per session: Not on file   • Stress: Not on file   Relationships   • Social connections:     Talks on phone: Not on file     Gets together: Not on file     Attends [employer] service: Not on file     Active member of club or organization: Not on file     Attends meetings of clubs or organizations: Not on file      Relationship status: Not on file   • Intimate partner violence:     Fear of current or ex partner: Not on file     Emotionally abused: Not on file     Physically abused: Not on file     Forced sexual activity: Not on file   Other Topics Concern   • Not on file   Social History Narrative   • Not on file       PCP : Mary Arias D.O. (Inactive)    Physical Exam     Vitals:    01/10/20 2125 01/10/20 2136 01/10/20 2146 01/10/20 2156   BP: 105/58 105/56 106/53 107/55   Pulse: 73 72 70 69   Resp: 20 19 19 20   Temp:       SpO2: 98% 99% 97% 96%   Weight:       Height:         Body mass index is 29.12 kg/m².  O2 therapy: Pulse Oximetry: 96 %, O2 Delivery: Ventilator    Physical Exam   Constitutional:   Appears older than stated age, intubated, no acute distress, intermittent coughing   HENT:   Moist mucous membranes   Neck: No JVD present.   Cardiovascular:   Regular rate and rhythm, no murmurs  IVC collapsible on bedside ultrasound  Grossly normal biventricular function, no pericardial effusion   Pulmonary/Chest:   Bilateral coarse rhonchi, intermittent end-expiratory wheezing  Bedside ultrasound shows minimal posterior B-lines, no anterior b-lines consistent with very mild pulmonary edema   Abdominal: Soft. Bowel sounds are normal.   Musculoskeletal:         General: No edema.   Neurological:   Intubated, intermittently opens eyes spontaneously   Skin: Skin is warm and dry.         Data Review       Old Records Request:   Deferred  Current Records review/summary: Completed    Lab Data Review:  Recent Results (from the past 24 hour(s))   Triglycerides Starting now and then Every 3 Days    Collection Time: 01/10/20  6:30 PM   Result Value Ref Range    Triglycerides 125 0 - 149 mg/dL   CBC WITH DIFFERENTIAL    Collection Time: 01/10/20  6:30 PM   Result Value Ref Range    WBC 15.7 (H) 4.8 - 10.8 K/uL    RBC 4.77 4.20 - 5.40 M/uL    Hemoglobin 12.3 12.0 - 16.0 g/dL    Hematocrit 41.4 37.0 - 47.0 %    MCV 86.8 81.4 -  97.8 fL    MCH 25.8 (L) 27.0 - 33.0 pg    MCHC 29.7 (L) 33.6 - 35.0 g/dL    RDW 50.0 35.9 - 50.0 fL    Platelet Count 383 164 - 446 K/uL    MPV 10.3 9.0 - 12.9 fL    Neutrophils-Polys 68.00 44.00 - 72.00 %    Lymphocytes 20.30 (L) 22.00 - 41.00 %    Monocytes 8.60 0.00 - 13.40 %    Eosinophils 1.40 0.00 - 6.90 %    Basophils 0.70 0.00 - 1.80 %    Immature Granulocytes 1.00 (H) 0.00 - 0.90 %    Nucleated RBC 0.00 /100 WBC    Neutrophils (Absolute) 10.64 (H) 2.00 - 7.15 K/uL    Lymphs (Absolute) 3.17 1.00 - 4.80 K/uL    Monos (Absolute) 1.35 (H) 0.00 - 0.85 K/uL    Eos (Absolute) 0.22 0.00 - 0.51 K/uL    Baso (Absolute) 0.11 0.00 - 0.12 K/uL    Immature Granulocytes (abs) 0.16 (H) 0.00 - 0.11 K/uL    NRBC (Absolute) 0.00 K/uL    Anisocytosis 1+     Microcytosis 1+    COMP METABOLIC PANEL    Collection Time: 01/10/20  6:30 PM   Result Value Ref Range    Sodium 134 (L) 135 - 145 mmol/L    Potassium 4.3 3.6 - 5.5 mmol/L    Chloride 100 96 - 112 mmol/L    Co2 18 (L) 20 - 33 mmol/L    Anion Gap 16.0 (H) 0.0 - 11.9    Glucose 310 (H) 65 - 99 mg/dL    Bun 19 8 - 22 mg/dL    Creatinine 1.08 0.50 - 1.40 mg/dL    Calcium 9.2 8.5 - 10.5 mg/dL    AST(SGOT) 24 12 - 45 U/L    ALT(SGPT) 18 2 - 50 U/L    Alkaline Phosphatase 96 30 - 99 U/L    Total Bilirubin 0.9 0.1 - 1.5 mg/dL    Albumin 3.7 3.2 - 4.9 g/dL    Total Protein 6.0 6.0 - 8.2 g/dL    Globulin 2.3 1.9 - 3.5 g/dL    A-G Ratio 1.6 g/dL   TROPONIN    Collection Time: 01/10/20  6:30 PM   Result Value Ref Range    Troponin T 20 (H) 6 - 19 ng/L   proBrain Natriuretic Peptide, NT    Collection Time: 01/10/20  6:30 PM   Result Value Ref Range    NT-proBNP 60525 (H) 0 - 125 pg/mL   LACTIC ACID    Collection Time: 01/10/20  6:30 PM   Result Value Ref Range    Lactic Acid 6.5 (HH) 0.5 - 2.0 mmol/L   ESTIMATED GFR    Collection Time: 01/10/20  6:30 PM   Result Value Ref Range    GFR If African American >60 >60 mL/min/1.73 m 2    GFR If Non African American 50 (A) >60 mL/min/1.73 m 2      PERIPHERAL SMEAR REVIEW    Collection Time: 01/10/20  6:30 PM   Result Value Ref Range    Peripheral Smear Review see below    PLATELET ESTIMATE    Collection Time: 01/10/20  6:30 PM   Result Value Ref Range    Plt Estimation Normal    MORPHOLOGY    Collection Time: 01/10/20  6:30 PM   Result Value Ref Range    RBC Morphology Present     Poikilocytosis 1+     Ovalocytes 1+    DIFFERENTIAL COMMENT    Collection Time: 01/10/20  6:30 PM   Result Value Ref Range    Comments-Diff see below    PROCALCITONIN    Collection Time: 01/10/20  6:30 PM   Result Value Ref Range    Procalcitonin <0.05 <0.25 ng/mL   URINALYSIS CULTURE, IF INDICATED    Collection Time: 01/10/20  6:50 PM   Result Value Ref Range    Color Yellow     Character Turbid (A)     Specific Gravity 1.018 <1.035    Ph 5.5 5.0 - 8.0    Glucose Negative Negative mg/dL    Ketones Negative Negative mg/dL    Protein 300 (A) Negative mg/dL    Bilirubin Negative Negative    Urobilinogen, Urine 1.0 Negative    Nitrite Negative Negative    Leukocyte Esterase Moderate (A) Negative    Occult Blood Moderate (A) Negative    Micro Urine Req Microscopic    URINE MICROSCOPIC (W/UA)    Collection Time: 01/10/20  6:50 PM   Result Value Ref Range    WBC Packed (A) /hpf    RBC 5-10 (A) /hpf    Bacteria Many (A) None /hpf    Epithelial Cells Negative /hpf    Hyaline Cast 3-5 (A) /lpf   URINE DRUG SCREEN    Collection Time: 01/10/20  7:31 PM   Result Value Ref Range    Amphetamines Urine Negative Negative    Barbiturates Negative Negative    Benzodiazepines Negative Negative    Cocaine Metabolite Negative Negative    Methadone Negative Negative    Opiates Negative Negative    Oxycodone Negative Negative    Phencyclidine -Pcp Negative Negative    Propoxyphene Negative Negative    Cannabinoid Metab Negative Negative   EKG (NOW)    Collection Time: 01/10/20  8:02 PM   Result Value Ref Range    Report       [facility] Emergency Dept[facility]    Test Date:  2020-01-10  Pt  Name:    VALENTINE MICHELLE                 Department: ER  MRN:        [id number]                      Room:       RD 09  Gender:     Female                       Technician: [id number]  :        1945                   Requested By:RAUL MOLINA  Order #:    [id number]                    Reading MD:    Measurements  Intervals                                Axis  Rate:       84                           P:          19  ND:         140                          QRS:        40  QRSD:       86                           T:          112  QT:         384  QTc:        454    Interpretive Statements  SINUS RHYTHM  PROBABLE LEFT ATRIAL ABNORMALITY  PROBABLE LVH WITH SECONDARY REPOL ABNRM  Compared to ECG 2017 12:25:26  Myocardial infarct finding no longer present  Possible ischemia no longer present         Imaging/Procedures Review:    Independant Imaging Review: Completed  DX-CHEST-PORTABLE (1 VIEW)   Final Result         1. No focal consolidation or pleural effusions.   2. Emphysema.   3. Well-positioned ETT and NGT.   4. Mild bilateral interstitial prominence, which may be related to mild edema or underlying emphysema.      CT-HEAD W/O    (Results Pending)   DX-CHEST-PORTABLE (1 VIEW)    (Results Pending)            EKG:   EKG Independent Review: Completed  QTc:454, HR: 84, Normal Sinus Rhythm, ST depression V4-V6 looks somewhat like LV strain pattern    Records reviewed and summarized in current documentation :  Yes  [employer] teaching service handout given to patient:  No         Assessment/Plan     * Acute respiratory failure with hypoxia (HCC)- (present on admission)  Assessment & Plan  Assessment: Patient presents with relatively rapid onset of acute hypoxic respiratory failure, some right lower lobe infiltrates.  Does not appear to be sufficient pulmonary edema to explain her respiratory failure and confusion. BNP was elevated however IVC collapsible and not clinically volume overloaded without edema or JVD. Potentially  secondary to a community-acquired pneumonia, she was recently hospitalized and this is somewhat at risk for resistant organisms, we will evaluate this with bronchoscopy.  Somewhat less likely could be secondary to COPD exacerbation, however wheezing is not a prominent feature of her presentation although minimally present and this does not usually present so quickly.  Bedside ultrasound was used to assess for likelihood of pulmonary embolism, right ventricle is roughly normal in size and function and IVC is collapsible.    Plan:  Patient is intubated  Ceftriaxone and azithromycin  Bronchoscopy to evaluate for potential organism  Duo nebs every 4 hours  ABG    Sepsis with encephalopathy without septic shock ([facility])  Assessment & Plan  Patient has sepsis(QSOFA 3/3, SIRS 3/4) with encephalopathy and lactic acidosis, secondary to likely community acquired pneumonia    Plan:  Bolus 2L, somewhat cautious for history of heart failure and early pulmonary edema  Ceftriaxone/azithromycin  Blood cultures  Bronch for further cultures  Trend lactic acid    Community acquired pneumonia  Assessment & Plan  Assessment: Patient presents with early RLL infiltrate, leukocytosis, and acute hypoxic respiratory failure consistent with CAP. Recent admission elevates risk of resistant organisms.    Plan:  Antibiotics as above  Bronchoscopy with culture and gram stain  Follow blood cultures  Procalcitonin    CHF (congestive heart failure) ([facility])  Assessment & Plan  Unknown EF apart from 70% in 2017, recommend day team obtain records. Do not believe it is contributing to her current issues.    Hold lasix, metoprolol    High anion gap metabolic acidosis  Assessment & Plan  Secondary to lactic acidosis    Trend lactate  IVF    COPD (chronic obstructive pulmonary disease) ([facility])  Assessment & Plan  Does not appear to be in acute exacerbation    Start Duonebs Q4H    DM (diabetes mellitus) ([facility])- (present on admission)  Assessment & Plan  Check  A1C  SSI + hypoglycemia protocol    CAD (coronary artery disease)- (present on admission)  Assessment & Plan  Continue ASA/statin, unlikely to be contributing to current presentation    Hypercalcemia- (present on admission)  Assessment & Plan  Continue cinacalcet      Anticipated Hospital stay:  >2 midnights        Quality Measures  Quality-Core Measures   Reviewed items::  Medications reviewed, Labs reviewed and EKG reviewed  Stevens catheter::  Critically Ill - Requiring Accurate Measurement of Urinary Output  DVT prophylaxis pharmacological::  Heparin    PCP: Mary Arias D.O. (Inactive)

## 2020-01-11 NOTE — ASSESSMENT & PLAN NOTE
-With nephrotic range proteinuria  -Most likely secondary to uncontrolled type 2 diabetes mellitus  -Avoid nephrotoxic medications  -Continue to monitor and renal dose adjustments

## 2020-01-11 NOTE — ED PROVIDER NOTES
ED Provider Note    Scribed for Dr. Rafi Shukla M.D. by Lisbeth Werner. 1/10/2020  6:26 PM    Primary care provider: Mary Arias D.O. (Inactive)  Means of arrival: EMS  History obtained from: EMS, Patient  History limited by: None    CHIEF COMPLAINT  Chief Complaint   Patient presents with   • Shortness of Breath       HPI  Lacey Her is a 74 y.o. female with a history of CHF and COPD who presents to the Emergency Department for evaluation of shortness of breath onset one hour prior to arrival. Per EMS, the patient had oxygen saturation level at 86% and was on 4 L supplemental oxygen on scene. En route, the patient was not able to tolerate CPAP and was placed on 15 L supplemental oxygen, and she additionally received 1 inch of nitroglycerin paste followed by 1 nitroglycerin tablet. Patient was 106/63 en route and improved work of breathing on arrival. She is negative for fever. Patient denies any intubation procedure in the past.  Patient is reporting recently admitted but this was elsewhere she has not been here for the last couple of years at this facility the patient is confused and severely short of breath and unable to provide other history    Patient was reportedly admitted for CHF exacerbation in the past. The patient has a history of diabetes and is compliant with metformin.     REVIEW OF SYSTEMS  Pertinent positives include shortness of breath. Pertinent negatives include no fever.   As above, all other systems reviewed and are negative.   See HPI for further details.     PAST MEDICAL HISTORY   has a past medical history of CAD (coronary artery disease), Chronic obstructive pulmonary disease (HCC), Chronic pain, Diabetes (HCC), Hypercholesterolemia, Hyperparathyroidism (HCC), Hypertension, and Myocardial infarct (HCC) (2004).    SURGICAL HISTORY   has a past surgical history that includes other cardiac surgery (2004).    SOCIAL HISTORY  Social History     Tobacco Use   • Smoking status:  "Former Smoker     Packs/day: 1.00     Years: 50.00     Pack years: 50.00     Types: Cigarettes     Last attempt to quit: 11/25/2016     Years since quitting: 3.1   Substance Use Topics   • Alcohol use: No   • Drug use: No      Social History     Substance and Sexual Activity   Drug Use No       FAMILY HISTORY  Family History   Problem Relation Age of Onset   • Lung Disease Mother    • Heart Disease Father        CURRENT MEDICATIONS  No current facility-administered medications on file prior to encounter.      Current Outpatient Medications on File Prior to Encounter   Medication Sig Dispense Refill   • cinacalcet (SENSIPAR) 30 MG Tab Take 30 mg by mouth every day.     • potassium chloride SA (KDUR) 20 MEQ Tab CR Take 20 mEq by mouth every day.     • furosemide (LASIX) 20 MG Tab Take 20 mg by mouth every day.     • Fluticasone-Umeclidin-Vilant (TRELEGY ELLIPTA) 100-62.5-25 MCG/INH AEROSOL POWDER, BREATH ACTIVATED Inhale 1 Puff by mouth 2 Times a Day.     • metoprolol SR (TOPROL XL) 100 MG TABLET SR 24 HR Take 100 mg by mouth every day.     • metformin (GLUCOPHAGE) 500 MG Tab Take 500 mg by mouth 2 times a day, with meals.     • albuterol (VENTOLIN HFA) 108 (90 BASE) MCG/ACT Aero Soln inhalation aerosol Inhale 1 Puff by mouth every 6 hours as needed for Shortness of Breath.         ALLERGIES  No Known Allergies    PHYSICAL EXAM  VITAL SIGNS: Pulse 98   Resp (!) 38   Ht 1.651 m (5' 5\")   Wt 79.4 kg (175 lb)   SpO2 94%   BMI 29.12 kg/m²     Constitutional: Well developed, Well nourished, severe respiratory distress, ill-appearing  HENT: Normocephalic, Atraumatic, Bilateral external ears normal, Oropharynx moist, No oral exudates.   Eyes: PERRLA, EOMI, Conjunctiva normal, No discharge.   Neck: No tenderness, Supple, No stridor.   Lymphatic: No lymphadenopathy noted.   Cardiovascular: Normal heart rate, Normal rhythm.   Thorax & Lungs: Severe respiratory distress. Mildy confused. Speaking in only short phrases. " Diminished breath sounds bilaterally. Basilar crackles and few wheezes.   Abdomen: Soft, No tenderness, No masses, No pulsatile masses.   Skin: Warm, Dry, No erythema, No rash.   Extremities:, No edema No cyanosis.   Musculoskeletal: No tenderness to palpation or major deformities noted.  Intact distal pulses  Neurologic: Awake, alert. Moves all extremities spontaneously.  Psychiatric: Affect normal, Judgment normal, Mood normal.       LABS  Results for orders placed or performed during the hospital encounter of 01/10/20   Triglycerides Starting now and then Every 3 Days   Result Value Ref Range    Triglycerides 125 0 - 149 mg/dL   CBC WITH DIFFERENTIAL   Result Value Ref Range    WBC 15.7 (H) 4.8 - 10.8 K/uL    RBC 4.77 4.20 - 5.40 M/uL    Hemoglobin 12.3 12.0 - 16.0 g/dL    Hematocrit 41.4 37.0 - 47.0 %    MCV 86.8 81.4 - 97.8 fL    MCH 25.8 (L) 27.0 - 33.0 pg    MCHC 29.7 (L) 33.6 - 35.0 g/dL    RDW 50.0 35.9 - 50.0 fL    Platelet Count 383 164 - 446 K/uL    MPV 10.3 9.0 - 12.9 fL    Neutrophils-Polys 68.00 44.00 - 72.00 %    Lymphocytes 20.30 (L) 22.00 - 41.00 %    Monocytes 8.60 0.00 - 13.40 %    Eosinophils 1.40 0.00 - 6.90 %    Basophils 0.70 0.00 - 1.80 %    Immature Granulocytes 1.00 (H) 0.00 - 0.90 %    Nucleated RBC 0.00 /100 WBC    Neutrophils (Absolute) 10.64 (H) 2.00 - 7.15 K/uL    Lymphs (Absolute) 3.17 1.00 - 4.80 K/uL    Monos (Absolute) 1.35 (H) 0.00 - 0.85 K/uL    Eos (Absolute) 0.22 0.00 - 0.51 K/uL    Baso (Absolute) 0.11 0.00 - 0.12 K/uL    Immature Granulocytes (abs) 0.16 (H) 0.00 - 0.11 K/uL    NRBC (Absolute) 0.00 K/uL    Anisocytosis 1+     Microcytosis 1+    COMP METABOLIC PANEL   Result Value Ref Range    Sodium 134 (L) 135 - 145 mmol/L    Potassium 4.3 3.6 - 5.5 mmol/L    Chloride 100 96 - 112 mmol/L    Co2 18 (L) 20 - 33 mmol/L    Anion Gap 16.0 (H) 0.0 - 11.9    Glucose 310 (H) 65 - 99 mg/dL    Bun 19 8 - 22 mg/dL    Creatinine 1.08 0.50 - 1.40 mg/dL    Calcium 9.2 8.5 - 10.5 mg/dL     AST(SGOT) 24 12 - 45 U/L    ALT(SGPT) 18 2 - 50 U/L    Alkaline Phosphatase 96 30 - 99 U/L    Total Bilirubin 0.9 0.1 - 1.5 mg/dL    Albumin 3.7 3.2 - 4.9 g/dL    Total Protein 6.0 6.0 - 8.2 g/dL    Globulin 2.3 1.9 - 3.5 g/dL    A-G Ratio 1.6 g/dL   TROPONIN   Result Value Ref Range    Troponin T 20 (H) 6 - 19 ng/L   proBrain Natriuretic Peptide, NT   Result Value Ref Range    NT-proBNP 92760 (H) 0 - 125 pg/mL   LACTIC ACID   Result Value Ref Range    Lactic Acid 6.5 (HH) 0.5 - 2.0 mmol/L   URINALYSIS CULTURE, IF INDICATED   Result Value Ref Range    Color Yellow     Character Turbid (A)     Specific Gravity 1.018 <1.035    Ph 5.5 5.0 - 8.0    Glucose Negative Negative mg/dL    Ketones Negative Negative mg/dL    Protein 300 (A) Negative mg/dL    Bilirubin Negative Negative    Urobilinogen, Urine 1.0 Negative    Nitrite Negative Negative    Leukocyte Esterase Moderate (A) Negative    Occult Blood Moderate (A) Negative    Micro Urine Req Microscopic    ESTIMATED GFR   Result Value Ref Range    GFR If African American >60 >60 mL/min/1.73 m 2    GFR If Non African American 50 (A) >60 mL/min/1.73 m 2   URINE MICROSCOPIC (W/UA)   Result Value Ref Range    WBC Packed (A) /hpf    RBC 5-10 (A) /hpf    Bacteria Many (A) None /hpf    Epithelial Cells Negative /hpf    Hyaline Cast 3-5 (A) /lpf   PERIPHERAL SMEAR REVIEW   Result Value Ref Range    Peripheral Smear Review see below    PLATELET ESTIMATE   Result Value Ref Range    Plt Estimation Normal    MORPHOLOGY   Result Value Ref Range    RBC Morphology Present     Poikilocytosis 1+     Ovalocytes 1+    DIFFERENTIAL COMMENT   Result Value Ref Range    Comments-Diff see below    PROCALCITONIN   Result Value Ref Range    Procalcitonin <0.05 <0.25 ng/mL   URINE DRUG SCREEN   Result Value Ref Range    Amphetamines Urine Negative Negative    Barbiturates Negative Negative    Benzodiazepines Negative Negative    Cocaine Metabolite Negative Negative    Methadone Negative Negative     Opiates Negative Negative    Oxycodone Negative Negative    Phencyclidine -Pcp Negative Negative    Propoxyphene Negative Negative    Cannabinoid Metab Negative Negative   Influenza A/B By PCR (Adult - Flu Only)   Result Value Ref Range    Influenza virus A RNA Negative Negative    Influenza virus B, PCR Negative Negative   EKG (NOW)   Result Value Ref Range    Report       [facility]    Test Date:  2020-01-10  Pt Name:    VALENTINE MICHELLE                 Department: ER  MRN:        [id number]                      Room:        09  Gender:     Female                       Technician: [id number]  :        [id number]                   Requested By:RAUL MOLINA  Order #:    [id number]                    Reading MD:    Measurements  Intervals                                Axis  Rate:       84                           P:          19  OK:         140                          QRS:        40  QRSD:       86                           T:          112  QT:         384  QTc:        454    Interpretive Statements  SINUS RHYTHM  PROBABLE LEFT ATRIAL ABNORMALITY  PROBABLE LVH WITH SECONDARY REPOL ABNRM  Compared to ECG 2017 12:25:26  Myocardial infarct finding no longer present  Possible ischemia no longer present       All labs reviewed by me.         RADIOLOGY  DX-CHEST-PORTABLE (1 VIEW)   Final Result         1. No focal consolidation or pleural effusions.   2. Emphysema.   3. Well-positioned ETT and NGT.   4. Mild bilateral interstitial prominence, which may be related to mild edema or underlying emphysema.      CT-HEAD W/O    (Results Pending)   DX-CHEST-PORTABLE (1 VIEW)    (Results Pending)     The radiologist's interpretation of all radiological studies have been reviewed by me.    Intubation Procedure Note    Indication: impending respiratory failure    Consent: Unable to be obtained due to the emergent nature of this procedure.    Medications Used: propofol  intravenously    Procedure: The patient was placed in the appropriate position.  Cricoid pressure was not required.  Intubation was performed by direct laryngoscopy using a laryngoscope and an 8.0 cuffed endotracheal tube.  The cuff was then inflated and the tube was secured appropriately  Initial confirmation of placement included bilateral breath sounds.  A chest x-ray to verify correct placement of the tube showed appropriate tube position.    The patient tolerated the procedure well.     Complications: None        COURSE & MEDICAL DECISION MAKING  Pertinent Labs & Imaging studies reviewed. (See chart for details)    6:26 PM - Patient seen and examined at bedside. The differential diagnoses include but are not limited to: pneumonia, CHF, and COPD.     6:33 PM - Critical care noted at this time. Patient will be treated with propofol injection for intubation procedure as outlined above.     6:39 PM - Ordered for DX chest, troponin, proBrain Natriuretic Peptide, lactic acid, blood culture, UA culture, and CBC with differential.     7:37 PM - Paged [facility].     7:42 PM I discussed the patient's case and the above findings with Dr. Castillo ([facility]) who agreed to admit the patient.     CRITICAL CARE  The very real possibilty of a deterioration of this patient's condition required the highest level of my preparedness for sudden, emergent intervention. I provided critical care services, which included medication orders, frequent reevaluations of the patient's condition and response to treatment, ordering and reviewing test results, and discussing the case with various consultants. The critical care time associated with the care of the patient was 35 minutes. Review chart for interventions. This time is exclusive of any other billable procedures.     Decision Makin-year-old female with a history of COPD and CHF presents in severe respiratory distress and respiratory failure agitated and confused and was intubated by  myself shortly after arrival because of this the patient did give verbal consent to the procedure.  History as above    DISPOSITION:  Patient will be hospitalized by Dr. Castillo (R ) in guarded condition.    FINAL IMPRESSION  Respiratory failure     ILisbeth (Nigel), am scribing for, and in the presence of, Rafi Shukla M.D..    Electronically signed by: Lisbeth Werner ([email or web address]), 1/10/2020    IRafi M.D. personally performed the services described in this documentation, as scribed by Lisbeth Werner in my presence, and it is both accurate and complete. C.     The note accurately reflects work and decisions made by me.  Rafi Shukla  1/10/2020  11:45 PM

## 2020-01-11 NOTE — PROGRESS NOTES
Critical Care Progress Note    Date of admission  1/10/2020    Chief Complaint  74 y.o. female who presented 1/10/2020 with respiratory failure and encephalopathy.  I attempted to call the patient's sister for history however the person who answered at that phone number did not know the patient.  Apparently she has a  but we do not have contact information for him.       Per ERP she had an hour onset of dyspnea so EMS was called.  In the ER she was in respiratory distress and encephalopathic so was intubated.  No additional history available.      POCUS shows normal biventricular function, small collapsing IVC, no significant valvular regurgitation, a couple scattered B-lines but not significant pulmonary edema    Hospital Course          Interval Problem Update  Reviewed last 24 hour events:  Prop  Fentanyl  Sinus gerber  Sinus rhythm  Levophed  5 mcg/min  bm prior to arrival  Currently npo  Core trak  Stevens in place  Right IJ triple  3 peripherals  Vent day 2  18/350/45  sbt again afternoon  Heparin, famotidine  Supplement elec  40 bid lasix  Echo pending, 30% per Dr Campo bedside    Addendum  Slowly waking up  sbt reviewed    Review of Systems  Review of Systems   Unable to perform ROS: Intubated        Vital Signs for last 24 hours   Temp:  [36.6 °C (97.9 °F)] 36.6 °C (97.9 °F)  Pulse:  [] 55  Resp:  [15-38] 19  BP: ()/(44-71) 104/52  SpO2:  [86 %-100 %] 100 %    Hemodynamic parameters for last 24 hours       Respiratory Information for the last 24 hours  Aleman Vent Mode: APVCMV  Rate (breaths/min): 18  Vt Target (mL): 350  PEEP/CPAP: 8  FiO2: 45  P MEAN: 12  Length of Weaning Trial (Hours): 10  Control VTE (exp VT): 351    Physical Exam   Physical Exam  Vitals signs and nursing note reviewed.   Constitutional:       General: She is not in acute distress.     Appearance: She is ill-appearing and toxic-appearing. She is not diaphoretic.   HENT:      Head: Normocephalic and atraumatic.       Right Ear: External ear normal.      Left Ear: External ear normal.      Nose: No congestion or rhinorrhea.      Mouth/Throat:      Mouth: Mucous membranes are dry.      Pharynx: No oropharyngeal exudate or posterior oropharyngeal erythema.   Eyes:      General: No scleral icterus.     Extraocular Movements: Extraocular movements intact.      Conjunctiva/sclera: Conjunctivae normal.      Pupils: Pupils are equal, round, and reactive to light.   Neck:      Musculoskeletal: Neck supple. No neck rigidity or muscular tenderness.   Cardiovascular:      Rate and Rhythm: Regular rhythm. Tachycardia present.      Pulses: Normal pulses.      Heart sounds: Normal heart sounds. No murmur.   Pulmonary:      Effort: Respiratory distress present.      Breath sounds: No wheezing.   Abdominal:      General: There is no distension.      Palpations: There is no mass.      Tenderness: There is no tenderness. There is no guarding.   Musculoskeletal:         General: No swelling or tenderness.      Right lower leg: No edema.      Left lower leg: No edema.   Lymphadenopathy:      Cervical: No cervical adenopathy.   Skin:     Coloration: Skin is not jaundiced or pale.      Findings: No bruising, erythema, lesion or rash.   Neurological:      General: No focal deficit present.      Mental Status: She is alert.      Cranial Nerves: No cranial nerve deficit.      Sensory: No sensory deficit.      Motor: No weakness.      Coordination: Coordination normal.      Deep Tendon Reflexes: Reflexes normal.      Comments: sedated         Medications  Current Facility-Administered Medications   Medication Dose Route Frequency Provider Last Rate Last Dose   • magnesium sulfate IVPB premix 2 g  2 g Intravenous Once Shanta Campo M.D. 25 mL/hr at 01/11/20 0557 2 g at 01/11/20 0557   • potassium chloride in water (KCL) ivpb **Administer in ICU only** 20 mEq  20 mEq Intravenous Once Rusty Solis M.D. 50 mL/hr at 01/11/20 0752 20 mEq at 01/11/20 0752    • fentaNYL (SUBLIMAZE) 50 mcg/mL in 50mL   Intravenous Continuous Rafi Shukla M.D.   Stopped at 01/10/20 2150   • norepinephrine (LEVOPHED) 8 mg in  mL Infusion  0-30 mcg/min Intravenous Continuous Sharad Castillo M.D. 9.4 mL/hr at 01/11/20 0610 5 mcg/min at 01/11/20 0610   • NOREPINEPHRINE BITARTRATE 1 MG/ML IV SOLN        Stopped at 01/10/20 2045   • ipratropium-albuterol (DUONEB) nebulizer solution  3 mL Nebulization Q4HRS (RT) Sharad Castillo M.D.   3 mL at 01/11/20 0628   • cefTRIAXone (ROCEPHIN) 2 g in  mL IVPB  2 g Intravenous Q24HRS Sharad Castillo M.D.   Stopped at 01/10/20 2342   • azithromycin (ZITHROMAX) 500 mg in D5W 250 mL IVPB  500 mg Intravenous Q24HRS Sharad Castillo M.D.   Stopped at 01/11/20 0112   • Respiratory Care per Protocol   Nebulization Continuous RT Sharad Castillo M.D.       • famotidine (PEPCID) tablet 20 mg  20 mg Enteral Tube Q12HRS Sharad Castillo M.D.        Or   • famotidine (PEPCID) injection 20 mg  20 mg Intravenous Q12HRS Sharad Castillo M.D.   20 mg at 01/11/20 0452   • senna-docusate (PERICOLACE or SENOKOT S) 8.6-50 MG per tablet 2 Tab  2 Tab Enteral Tube BID Sharad Castillo M.D.   Stopped at 01/10/20 2130    And   • polyethylene glycol/lytes (MIRALAX) PACKET 1 Packet  1 Packet Enteral Tube QDAY PRN Sharad Castillo M.D.        And   • magnesium hydroxide (MILK OF MAGNESIA) suspension 30 mL  30 mL Enteral Tube QDAY PRN Sharad Castillo M.D.        And   • bisacodyl (DULCOLAX) suppository 10 mg  10 mg Rectal QDAY PRN Sharad Castillo M.D.       • MD Alert...ICU Electrolyte Replacement per Pharmacy   Other PHARMACY TO DOSE Sharad Castillo M.D.       • lidocaine (XYLOCAINE) 1 % injection 1-2 mL  1-2 mL Tracheal Tube Q30 MIN PRN Sharad Castillo M.D.       • fentaNYL (SUBLIMAZE) injection 50 mcg  50 mcg Intravenous Q15 MIN PRN Sharad Castillo M.D.   50 mcg at 01/11/20 0116    And   • fentaNYL (SUBLIMAZE) injection 100 mcg  100 mcg Intravenous Q15 MIN PRN Sharad SALDIVAR  JEB Castillo   100 mcg at 01/11/20 0137    And   • fentaNYL (SUBLIMAZE) 50 mcg/mL in 50mL (Continuous Infusion)   Intravenous Continuous Sharad Castillo M.D. 4 mL/hr at 01/11/20 0708 200 mcg/hr at 01/11/20 0708    And   • propofol (DIPRIVAN) injection  0-40 mcg/kg/min Intravenous Continuous Sharad Castillo M.D. 14.3 mL/hr at 01/11/20 0324 30 mcg/kg/min at 01/11/20 0324   • insulin lispro (HUMALOG) injection 1-6 Units  1-6 Units Subcutaneous Q6HRS Sharad Castillo M.D.   Stopped at 01/11/20 0000    And   • glucose 4 g chewable tablet 16 g  16 g Oral Q15 MIN PRN Sharad Castillo M.D.        And   • DEXTROSE 10% BOLUS 250 mL  250 mL Intravenous Q15 MIN PRN Sharad Castillo M.D.       • heparin injection 5,000 Units  5,000 Units Subcutaneous Q8HRS Sharad Castillo M.D.   5,000 Units at 01/11/20 0453   • cinacalcet (SENSIPAR) tablet 30 mg  30 mg Oral DAILY Sharad Castillo M.D.   Stopped at 01/11/20 0600   • SODIUM CHLORIDE 0.9 % IV SOLN                Fluids    Intake/Output Summary (Last 24 hours) at 1/11/2020 0752  Last data filed at 1/11/2020 0600  Gross per 24 hour   Intake 2522.98 ml   Output 550 ml   Net 1972.98 ml       Laboratory  Recent Labs     01/11/20  0408   ISTATAPH 7.320*   ISTATAPCO2 46.1*   ISTATAPO2 75   ISTATATCO2 25   [id number] 94   ISTATARTHCO3 23.7   ISTATARTBE -3   ISTATTEMP 37.2 C   ISTATFIO2 60   ISTATSPEC Arterial   ISTATAPHTC 7.317*   [id number] 76         Recent Labs     01/10/20  1830 01/11/20  0500   SODIUM 134* 139   POTASSIUM 4.3 3.7   CHLORIDE 100 109   CO2 18* 21   BUN 19 19   CREATININE 1.08 0.83   MAGNESIUM  --  1.8   PHOSPHORUS  --  4.1   CALCIUM 9.2 8.8     Recent Labs     01/10/20  1830 01/11/20  0500   ALTSGPT 18 17   ASTSGOT 24 15   ALKPHOSPHAT 96 72   TBILIRUBIN 0.9 0.5   GLUCOSE 310* 113*     Recent Labs     01/10/20  1830 01/11/20  0500   WBC 15.7* 12.8*   NEUTSPOLYS 68.00 76.80*   LYMPHOCYTES 20.30* 10.90*   MONOCYTES 8.60 10.90   EOSINOPHILS 1.40 0.20   BASOPHILS 0.70 0.40    ASTSGOT 24 15   ALTSGPT 18 17   ALKPHOSPHAT 96 72   TBILIRUBIN 0.9 0.5     Recent Labs     01/10/20  1830 01/11/20  0500   RBC 4.77 4.21   HEMOGLOBIN 12.3 10.9*   HEMATOCRIT 41.4 36.9*   PLATELETCT 383 338       Imaging  X-Ray:  I have personally reviewed the images and compared with prior images.  EKG:  I have personally reviewed the images and compared with prior images.  CT:    Reviewed    Assessment/Plan  * Acute respiratory failure with hypoxia (HCC)- (present on admission)  Assessment & Plan  Septic shock from pna  Cardiac ? Poor ef, pending formal echo  Intubated  Rt/o2 protocols  Resuscitated, now diuresing    Septic shock (HCC)  Assessment & Plan  This is Septic shock Present on admission  SIRS criteria identified on my evaluation include: Tachycardia, with heart rate greater than 90 BPM, Tachypnea, with respirations greater than 20 per minute and Leukocyosis, with WBC greater than 12,000  Source is urinary vs pulmonary  Presentation includes: Severe sepsis present, persistent hypotension after 30 ml/kg completed, and initial lactate level result is >= 4 mmol/L.   Despite appropriate fluid resuscitation with crystalloid given per sepsis guidelines, the patient remains hypotensive with systolic blood pressure less than 90 or MAP less than 65  Hemodynamic support with additional fluids and IV vasopressors as needed to maintain a SBP of 90 or MAP of 65  IV antibiotics as appropriate for source of sepsis  Reassessment: I have reassessed the patient's hemodynamic status    Urinary +/- PNA with RLL infiltrate  CTX, azithro  LV initially looked normal function with small and collapsing IVC, then function appeared ~30% after fluid resuscitation so no additional fluid given  S/p bronch, UC pending  Post resuscitation diuresis started this am      Community acquired pneumonia  Assessment & Plan  Apparently hospitalized recently for CHF, but we don't have additional history about this  DRIP score 2 based on history  we have  Treating CAP not resistant organisms  Flu neg      Encephalopathy acute  Assessment & Plan  Probably sepsis related   CTH reassuring  No neck stiffness concerning for meningitis  Following commands on propofol    CHF (congestive heart failure) ([facility])  Assessment & Plan  Normal EF 2017  Initially normal EF on bedside TTE, then after fluids EF appeared about 30%  Formal TTE in am  No additional fluid now  No BB or ACE given shock  BP high    COPD (chronic obstructive pulmonary disease) ([facility])  Assessment & Plan  Not wheezing on exam  duonebs  No indication for prednisone    DM (diabetes mellitus) ([facility])- (present on admission)  Assessment & Plan  SSI    CAD (coronary artery disease)- (present on admission)  Assessment & Plan  No ASA, statin on home meds list       VTE:  Heparin  Ulcer: H2 Antagonist  Lines: Central Line  Ongoing indication addressed and Stevens Catheter  Ongoing indication addressed    I have performed a physical exam and reviewed and updated ROS and Plan today (1/11/2020). In review of yesterday's note (1/10/2020), there are no changes except as documented above.     Discussed patient condition and risk of morbidity and/or mortality with RN, RT, Pharmacy, , Code status disscussed, Charge nurse / hot rounds and Patient     The patient remains critically ill.  ON mechanical ventilator. ON pressors for map > 65 and sbp > 90.  On propofol and fentanyl for sedation.  SBT.  Critical care time = 78 minutes in directly providing and coordinating critical care and extensive data review.  No time overlap and excludes procedures.

## 2020-01-11 NOTE — ASSESSMENT & PLAN NOTE
Not wheezing on exam but significant COPD   duoneb every 4 hours while awake and every 2 hours as needed  No indication for prednisone, monitoring  RT protocols  Mobilize  Patient is on home O2, 3-4 L.  She already has a home nebulizer

## 2020-01-11 NOTE — PROGRESS NOTES
Cortrak Placement    Tube Team verified patient name and medical record number prior to tube placement.  Cortrak tube (55 inches, 10 Green[place]ic) placed at 65 cm in right nare.  Per Cortrak picture, tube appears to be in the stomach.  Nursing Instructions: Awaiting KUB to confirm placement before use for medications or feeding. Once placement confirmed, flush tube with 30 ml of water, and then remove and save stylet, in patient medication drawer.

## 2020-01-11 NOTE — PROGRESS NOTES
2 RN skin check complete.   Devices in place: Restraints and SCDs.  Skin assessed under devices.  Scar found along right shin (fully healed)  The following interventions in place: Pillows under arms, heels floated and q2 hour turns in place.

## 2020-01-11 NOTE — ASSESSMENT & PLAN NOTE
Assessment: Patient presented with early RLL infiltrate, leukocytosis, and acute hypoxic respiratory failure consistent with CAP. Recent admission increases the risk of resistant organisms.  Treating for community-acquired pneumonia and not resistant organisms. Procalcitonin negative.  Flu negative, leukocytosis improving, lactic acidosis resolved.      Plan:  - Resolved  - DC home, cleared by PT/OT

## 2020-01-11 NOTE — ASSESSMENT & PLAN NOTE
-CT head negative for any acute abnormality  -Most likely sepsis related  -No neck stiffness on presentation  -Improved.  Alert and oriented.

## 2020-01-11 NOTE — ED NOTES
Med Rec Updated PARTIALLY per Historical  Allergies Reviewed Historically  UNK PO ABX HX.    Pt unable to participate in an interview at this time.  No family at bedside, unknown if family is aware Pt is here.    Follow up may be provided at a later time.

## 2020-01-11 NOTE — ASSESSMENT & PLAN NOTE
-Does not appear to be on exacerbation.  No wheezing on examination  -RT per protocol  -Start home medications  -No indication for prednisone

## 2020-01-11 NOTE — ASSESSMENT & PLAN NOTE
Patient has sepsis(QSOFA 3/3, SIRS 3/4) with encephalopathy and lactic acidosis, secondary to likely community acquired pneumonia    Plan:  Bolus 2L, somewhat cautious for history of heart failure and early pulmonary edema  Ceftriaxone/azithromycin  Blood cultures  Bronch for further cultures  Trend lactic acid

## 2020-01-11 NOTE — ASSESSMENT & PLAN NOTE
History obtained by ERP was 1 hour of acute onet dyspnea.  This is concerning for PE or MI.  Low suspicion for PE given alternate etiologies and normal appearing RV on echo. MI certainly possible.  No STEMI, but + lateral and high lateral STD. Trend trops.  Probably related to her sepsis +/- PNA.  S. pneumo can come on quickly.  Echocardiogram shows EF down from 70% 2 years ago to 30% now.  Cardiology consultation pending, forced diuresis continues  Returning to baseline

## 2020-01-11 NOTE — ASSESSMENT & PLAN NOTE
-Last echocardiogram in 2017 showed EF of 70%.  Bedside echocardiogram showed low EF.  Elevated BNP on admission.  Chest x-ray concerning for interstitial edema. Now that patient has recovered she describes orthopnea prior to admission, consider that her presentation may have been secondary to heart failure exacerbation. Her MR(?secondary) may predispose her to exacerbation with relatively small changes in her volume.  -TTE showed EF 30%, Akinetic mid-distal septum, anterior wall and apex. Severely reduced left ventricular systolic function.  -Transition to home lasix dosing, will titrate as appropriate - may require higher dose of 40mg daily  -ASA/statin  -Started Lisinopril 5mg for HFrEF  -Carvedilol 3.125mg BID  -Spironolactone 25mg  -Potassium replacement  -Cardiology onboard, will follow up out patient

## 2020-01-11 NOTE — PROGRESS NOTES
Dr Melara paged and updated on patient SBP dropping to 60's, current Levophed 5mcg and HR drop to the 50's. Order received for 1L NS.

## 2020-01-11 NOTE — PROGRESS NOTES
[facility] ICU Progress Note      Admit Date: 1/10/2020    Resident(s): Nancy Pickard  Attending: SONDRA HEARN/ Dr. Solis    Date & Time:   1/11/2020   8:50 AM       Patient ID:    Name:             Lacey Her     YOB: 1945  Age:                 74 y.o.  female   MRN:               [id number]    HPI:  Ms. Her, is a pleasant 74-year-old female with past medical history significant for type 2 diabetes mellitus, COPD, and STEMI s/p CABG, congestive heart failure, and hyperparathyroidism admitted on 1/10/2019 due to acute acute hypoxic respiratory failure secondary to presumed sepsis (UTI versus CAP) with worsening confusion resulting into intubation and mechanical ventilation to protect the airway.  Recently she was hospitalized at [facility] for heart failure exacerbation per EMS (exact details unknown) no family at bedside for further history.    Patient became hypotensive in the ER upon initiation of propofol, fluid bolus was given but due to her history of heart failure, bedside echo showing low EF, norepinephrine was started to support her blood pressure.    On admission, patient underwent therapeutic and diagnostic bronchoscopy, BAL sent for culture and sensitivity.  Central line was inserted for access, medication use     Consultants:  ICU:     Interval Events:  -No new events overnight, underwent bronchoscopy and central line insertion  -Patient is intubated and is on mechanical ventilation.  Sedated.  -On norepinephrine drip at 5 mcg/min for persistent hypotension  -Bradycardia ( HR 45-55bpm )  -Full code.  Family discussions on goals of care  -SBT this afternoon, cortrack insertion, follow up TTE results    Review of Systems   Unable to perform ROS: Intubated     PHYSICAL EXAM    Vitals:    01/11/20 0600 01/11/20 0615 01/11/20 0630 01/11/20 0634   BP: 107/46 106/49 104/52    Pulse: (!) 49 (!) 51 (!) 50 (!) 55   Resp: (!) 22 (!) 22 19    Temp:       TempSrc:       SpO2: 100% 100% 99% 100%  "  Weight:       Height:         Body mass index is 27.44 kg/m² (pended).  /52   Pulse (!) 55   Temp 36.6 °C (97.9 °F)   Resp 19   Ht 1.651 m (5' 5\")   Wt (P) 74.8 kg (164 lb 14.5 oz)   SpO2 100%   BMI (P) 27.44 kg/m²   O2 therapy: Pulse Oximetry: 100 %, O2 Delivery: Ventilator    Physical Exam   Constitutional: She is well-developed, well-nourished, and in no distress. She is intubated.   HENT:   Head: Normocephalic and atraumatic.   Eyes: Pupils are equal, round, and reactive to light.   Neck: Normal range of motion. Neck supple.   Cardiovascular: Regular rhythm and normal heart sounds. Bradycardia present.   No murmur heard.  Pulmonary/Chest: Breath sounds normal. She is intubated. No respiratory distress. She has no wheezes.   Abdominal: Soft. Bowel sounds are normal. She exhibits no distension.   Musculoskeletal:         General: No edema.   Neurological:   Sedated   Skin: Skin is warm and dry.       Respiratory:  Aleman Vent Mode: APVCMV  Respiration: 19, Pulse Oximetry: 100 %    Chest Tube Drains:    Recent Labs     01/11/20  0408   ISTATAPH 7.320*   ISTATAPCO2 46.1*   ISTATAPO2 75   ISTATATCO2 25   UEDDHTC3DRF 94   ISTATARTHCO3 23.7   ISTATARTBE -3   ISTATTEMP 37.2 C   ISTATFIO2 60   ISTATSPEC Arterial   ISTATAPHTC 7.317*   ONBASAYK5VD 76       HemoDynamics:  Pulse: (!) 55 Blood Pressure : 104/52      Neuro:  Sedated    Fluids:     Intake/Output Summary (Last 24 hours) at 1/11/2020 0850  Last data filed at 1/11/2020 0600  Gross per 24 hour   Intake 2522.98 ml   Output 550 ml   Net 1972.98 ml       Weight: (P) 74.8 kg (164 lb 14.5 oz)  Body mass index is 27.44 kg/m² (pended).    Recent Labs     01/10/20  1830 01/11/20  0500   SODIUM 134* 139   POTASSIUM 4.3 3.7   CHLORIDE 100 109   CO2 18* 21   BUN 19 19   CREATININE 1.08 0.83   MAGNESIUM  --  1.8   PHOSPHORUS  --  4.1   CALCIUM 9.2 8.8       GI/Nutrition:  Recent Labs     01/10/20  1830 01/11/20  0500   ALTSGPT 18 17   ASTSGOT 24 15 "   ALKPHOSPHAT 96 72   TBILIRUBIN 0.9 0.5   GLUCOSE 310* 113*       Heme:  Recent Labs     01/10/20  1830 20  0500   RBC 4.77 4.21   HEMOGLOBIN 12.3 10.9*   HEMATOCRIT 41.4 36.9*   PLATELETCT 383 338       Infectious Disease:  Monitored Temp 2  Av.2 °C (99 °F)  Min: 37.1 °C (98.8 °F)  Max: 37.4 °C (99.3 °F)  Temp  Av.6 °C (97.9 °F)  Min: 36.6 °C (97.9 °F)  Max: 36.6 °C (97.9 °F)  Recent Labs     01/10/20  1830 01/11/20  0500   WBC 15.7* 12.8*   NEUTSPOLYS 68.00 76.80*   LYMPHOCYTES 20.30* 10.90*   MONOCYTES 8.60 10.90   EOSINOPHILS 1.40 0.20   BASOPHILS 0.70 0.40   ASTSGOT 24 15   ALTSGPT 18 17   ALKPHOSPHAT 96 72   TBILIRUBIN 0.9 0.5       Meds:  • potassium chloride (KCL-CENTRAL) IV *Administer in ICU only*  20 mEq 20 mEq (20 0752)   • furosemide  40 mg     • fentaNYL   Stopped (01/10/20 2150)   • NORepinephrine (LEVOPHED) infusion  0-30 mcg/min 5 mcg/min (20 0610)   • ipratropium-albuterol  3 mL     • cefTRIAXone (ROCEPHIN) IV  2 g Stopped (01/10/20 2342)   • azithromycin (ZITHROMAX) IV  500 mg Stopped (20 0112)   • Respiratory Care per Protocol       • famotidine  20 mg      Or   • famotidine  20 mg     • senna-docusate  2 Tab      And   • polyethylene glycol/lytes  1 Packet      And   • magnesium hydroxide  30 mL      And   • bisacodyl  10 mg     • MD Alert...Adult ICU Electrolyte Replacement per Pharmacy       • lidocaine  1-2 mL     • fentaNYL  50 mcg      And   • fentaNYL  100 mcg      And   • fentaNYL   200 mcg/hr (20 0708)    And   • propofol  0-40 mcg/kg/min 10 mcg/kg/min (20 0826)   • insulin lispro  1-6 Units      And   • glucose  16 g      And   • dextrose 10% bolus  250 mL     • heparin  5,000 Units     • cinacalcet  30 mg     • NS            Procedures:  Intubation 1/10/2020 in the field  Right IJ central line insertion 1/10/2020  Bronchoscopy 2020    Imaging:  DX-CHEST-FOR LINE PLACEMENT Perform procedure in: Patient's Room   Final Result         1.   Pulmonary edema and/or infiltrates are identified, which are stable since the prior exam.   2.  Cardiomegaly   3.  Atherosclerosis      CT-HEAD W/O   Final Result         1.  No acute intracranial abnormality is identified, there are nonspecific white matter changes, commonly associated with small vessel ischemic disease.  Associated mild cerebral atrophy is noted.   2.  Atherosclerosis.      DX-CHEST-PORTABLE (1 VIEW)   Final Result         1. No focal consolidation or pleural effusions.   2. Emphysema.   3. Well-positioned ETT and NGT.   4. Mild bilateral interstitial prominence, which may be related to mild edema or underlying emphysema.      EC-ECHOCARDIOGRAM COMPLETE W/O CONT    (Results Pending)       Problem and Plan:    * Acute respiratory failure with hypoxia (HCC)- (present on admission)  Assessment & Plan  Assessment: Patient presented with relatively rapid onset of acute hypoxic respiratory failure. Does not appear to be sufficient pulmonary edema to explain her respiratory failure and confusion. BNP was elevated however IVC collapsible and not clinically volume overloaded without edema or JVD. Potentially secondary to a community-acquired pneumonia, she was recently hospitalized and this is somewhat at risk for resistant organisms. S/P bronchoscopy, BAL sent for culture and sensitivity. Somewhat less likely could be secondary to COPD exacerbation, however wheezing is not a prominent feature of her presentation although minimally present and this does not usually present so quickly.  Bedside ultrasound was used to assess for likelihood of pulmonary embolism, right ventricle is roughly normal in size and function and IVC is collapsible.  -Patient was intubated in the field    Plan:  -Continue light sedation with propofol  -Lung protective ventilation  -Continue ceftriaxone and azithromycin  -Follow-up BAL culture and sensitivity  -SBT  -Goals of care discussion with the family  Ji montes every 4  hours  ABG    Septic shock (HCC)  Assessment & Plan  This is Septic shock Present on admission  SIRS criteria identified on my evaluation include: Tachycardia, with heart rate greater than 90 BPM, Tachypnea, with respirations greater than 20 per minute and Leukocyosis, with WBC greater than 12,000  Source is UTI vs CAP  Presentation includes: Severe sepsis present, persistent hypotension after 30 ml/kg completed, and initial lactate level result is >= 4 mmol/L.   Despite appropriate fluid resuscitation with crystalloid given per sepsis guidelines, the patient remains hypotensive with systolic blood pressure less than 90 or MAP less than 65  Hemodynamic support with additional fluids and IV vasopressors as needed to maintain a SBP of 90 or MAP of 65  IV antibiotics as appropriate for source of sepsis  Reassessment: I have reassessed the patient's hemodynamic status    -Patient had (QSOFA 3/3, SIRS 3/4 on presentation) with encephalopathy and lactic acidosis, secondary to likely community acquired pneumonia versus urinary tract infection  -Resolved lactic acidosis, patient received bolus of fluids  -Patient is currently sedated, intubated and on mechanical ventilation  -Elevated BNP with low EF seen on bedside echocardiogram  -Blood culture negative so far  -On norepinephrine drip for hypotension    Plan:  -Continue Ceftriaxone/azithromycin  -Follow-up blood and urine culture  -Follow-up BAL culture and sensitivity  -Monitor input and output  -Goals of care discussion    Community acquired pneumonia  Assessment & Plan  Assessment: Patient presented with early RLL infiltrate, leukocytosis, and acute hypoxic respiratory failure consistent with CAP. Recent admission increases the risk of resistant organisms.  Treating for community-acquired pneumonia and not resistant organisms. Procalcitonin negative.  Flu negative, leukocytosis improving, lactic acidosis resolved.    Plan:  -Continue antibiotics ceftriaxone and  azithromycin  -Follow-up blood, urine and BAL culture and sensitivity    Encephalopathy acute  Assessment & Plan  -CT head negative for any acute abnormality  -Most likely sepsis related  -No neck stiffness on presentation  -Continue to titrate down propofol for neurological assessment    Dyspnea  Assessment & Plan  -Per history obtained by ER physician, acute onset, 1 hour duration  -Bedside echocardiogram showed normal-appearing right ventricle, unlikely to be PE  -No STEMI.  Troponin trends not impressive.  MI to be unlikely  -Most likely sepsis related  -s/p intubation and on mechanical ventilation  -SBT trial for possible extubation    CHF (congestive heart failure) ([facility])  Assessment & Plan  -Last echocardiogram in 2017 showed EF of 70%.  Bedside echocardiogram showed low EF.  Elevated BNP on admission.  Chest x-ray concerning for interstitial edema.  -Continue Lasix 40 mg twice daily  -Pending TTE    COPD (chronic obstructive pulmonary disease) ([facility])  Assessment & Plan  -Does not appear to be on exacerbation.  No wheezing on examination  -RT per protocol  -DuoNeb every 4 hourly/as needed  -No indication for prednisone    DM (diabetes mellitus) ([facility])- (present on admission)  Assessment & Plan  -On metformin at home  -Hold metformin  -Start SSI  -Check A1c  -Accu-Chek  -Hypoglycemia management    CAD (coronary artery disease)- (present on admission)  Assessment & Plan  -No aspirin or statin on home med list  -Check lipid panel  -Follow-up TTE  -Consider starting aspirin and statin        DISPO: Remains critically ill requiring ICU level of care    CODE STATUS: Full    Quality Measures:  Stevens Catheter:  Yes for accurate input and output measurements  DVT Prophylaxis:  Lovenox  Ulcer Prophylaxis:  Famotidine  Antibiotics: Ceftriaxone and Azithromycin  Lines: PIV, Right IJ central line

## 2020-01-11 NOTE — CONSULTS
Critical Care Consultation    Date of consult: 1/10/2020    Referring Physician  Rusty Solis M.D.    Reason for Consultation  Respiratory failure, encephalopathy    History of Presenting Illness  74 y.o. female who presented 1/10/2020 with respiratory failure and encephalopathy.  I attempted to call the patient's sister for history however the person who answered at that phone number did not know the patient.  Apparently she has a  but we do not have contact information for him.      Per ERP she had an hour onset of dyspnea so EMS was called.  In the ER she was in respiratory distress and encephalopathic so was intubated.  No additional history available.     POCUS shows normal biventricular function, small collapsing IVC, no significant valvular regurgitation, a couple scattered B-lines but not significant pulmonary edema    Code Status  Full Code    Review of Systems  Review of Systems   Unable to perform ROS: Intubated       Past Medical History   has a past medical history of CAD (coronary artery disease), Chronic obstructive pulmonary disease (HCC), Chronic pain, Diabetes (HCC), Hypercholesterolemia, Hyperparathyroidism (HCC), Hypertension, and Myocardial infarct ([facility]) (2004).    Surgical History   has a past surgical history that includes other cardiac surgery (2004).    Family History  family history includes Heart Disease in her father; Lung Disease in her mother.    Social History   reports that she quit smoking about 3 years ago. Her smoking use included cigarettes. She has a 50.00 pack-year smoking history. She does not have any smokeless tobacco history on file. She reports that she does not drink alcohol or use drugs.  Unable to obtain history due to altered mental status  Medications  Home Medications     Reviewed by Kamari Busby (Pharmacy Tech) on 01/10/20 at 2133  Med List Status: Partial   Medication Last Dose Status   albuterol (VENTOLIN HFA) 108 (90 BASE) MCG/ACT Aero Soln  inhalation aerosol UNK Active   cinacalcet (SENSIPAR) 30 MG Tab UNK Active   Fluticasone-Umeclidin-Vilant (TRELEGY ELLIPTA) 100-62.5-25 MCG/INH AEROSOL POWDER, BREATH ACTIVATED UNK Active   furosemide (LASIX) 20 MG Tab UNK Active   metformin (GLUCOPHAGE) 500 MG Tab UNK Active   metoprolol SR (TOPROL XL) 100 MG TABLET SR 24 HR UNK Active   potassium chloride SA (KDUR) 20 MEQ Tab CR UNK Active              Current Facility-Administered Medications   Medication Dose Route Frequency Provider Last Rate Last Dose   • magnesium sulfate IVPB premix 2 g  2 g Intravenous Once Shanta Campo M.D.       • fentaNYL (SUBLIMAZE) 50 mcg/mL in 50mL   Intravenous Continuous Rafi Shukla M.D.   Stopped at 01/10/20 2150   • norepinephrine (LEVOPHED) 8 mg in  mL Infusion  0-30 mcg/min Intravenous Continuous Sharad Castillo M.D. 7.5 mL/hr at 01/11/20 0100 4 mcg/min at 01/11/20 0100   • NOREPINEPHRINE BITARTRATE 1 MG/ML IV SOLN        Stopped at 01/10/20 2045   • ipratropium-albuterol (DUONEB) nebulizer solution  3 mL Nebulization Q4HRS (RT) Sharad Castillo M.D.   3 mL at 01/11/20 0239   • cefTRIAXone (ROCEPHIN) 2 g in  mL IVPB  2 g Intravenous Q24HRS Sharad Castillo M.D.   Stopped at 01/10/20 2342   • azithromycin (ZITHROMAX) 500 mg in D5W 250 mL IVPB  500 mg Intravenous Q24HRS Sharad Castillo M.D.   Stopped at 01/11/20 0112   • Respiratory Care per Protocol   Nebulization Continuous RT Sharad Castillo M.D.       • famotidine (PEPCID) tablet 20 mg  20 mg Enteral Tube Q12HRS Sharad Castillo M.D.        Or   • famotidine (PEPCID) injection 20 mg  20 mg Intravenous Q12HRS Sharad Castillo M.D.   20 mg at 01/10/20 2312   • senna-docusate (PERICOLACE or SENOKOT S) 8.6-50 MG per tablet 2 Tab  2 Tab Enteral Tube BID Sharad Castillo M.D.   Stopped at 01/10/20 2130    And   • polyethylene glycol/lytes (MIRALAX) PACKET 1 Packet  1 Packet Enteral Tube QDAY PRN Sharad Castillo M.D.        And   • magnesium hydroxide (MILK OF MAGNESIA)  suspension 30 mL  30 mL Enteral Tube QDAY PRN Sharad Castillo M.D.        And   • bisacodyl (DULCOLAX) suppository 10 mg  10 mg Rectal QDAY PRN Sharad Castillo M.D.       • MD Alert...ICU Electrolyte Replacement per Pharmacy   Other PHARMACY TO DOSE Sharad Castillo M.D.       • lidocaine (XYLOCAINE) 1 % injection 1-2 mL  1-2 mL Tracheal Tube Q30 MIN PRN Sharad Castillo M.D.       • fentaNYL (SUBLIMAZE) injection 50 mcg  50 mcg Intravenous Q15 MIN PRN Sharad Castillo M.D.   50 mcg at 01/11/20 0116    And   • fentaNYL (SUBLIMAZE) injection 100 mcg  100 mcg Intravenous Q15 MIN PRN Sharad Castillo M.D.   100 mcg at 01/11/20 0137    And   • fentaNYL (SUBLIMAZE) 50 mcg/mL in 50mL (Continuous Infusion)   Intravenous Continuous Sharad Castillo M.D. 4 mL/hr at 01/11/20 0233 200 mcg/hr at 01/11/20 0233    And   • propofol (DIPRIVAN) injection  0-40 mcg/kg/min Intravenous Continuous Sharad Castillo M.D. 14.3 mL/hr at 01/11/20 0324 30 mcg/kg/min at 01/11/20 0324   • insulin lispro (HUMALOG) injection 1-6 Units  1-6 Units Subcutaneous Q6HRS Sharad Castillo M.D.   Stopped at 01/11/20 0000    And   • glucose 4 g chewable tablet 16 g  16 g Oral Q15 MIN PRN Sharad Castillo M.D.        And   • DEXTROSE 10% BOLUS 250 mL  250 mL Intravenous Q15 MIN PRN Sharad Castillo M.D.       • heparin injection 5,000 Units  5,000 Units Subcutaneous Q8HRS Sharad Castillo M.D.   5,000 Units at 01/10/20 2312   • cinacalcet (SENSIPAR) tablet 30 mg  30 mg Oral DAILY Sharad Castillo M.D.       • SODIUM CHLORIDE 0.9 % IV SOLN                Allergies  No Known Allergies    Vital Signs last 24 hours  Temp:  [36.6 °C (97.9 °F)] 36.6 °C (97.9 °F)  Pulse:  [] 53  Resp:  [15-38] 25  BP: ()/(44-70) 64/44  SpO2:  [86 %-100 %] 100 %    Physical Exam  Physical Exam  Vitals signs and nursing note reviewed.   Constitutional:       Comments: Intubated sedated   HENT:      Mouth/Throat:      Comments: ETT  Eyes:      Pupils: Pupils are equal, round, and  reactive to light.   Cardiovascular:      Rate and Rhythm: Normal rate and regular rhythm.   Pulmonary:      Comments: Mostly riding the vent with an occaional double stack  Abdominal:      General: Bowel sounds are normal. There is no distension.      Tenderness: There is no tenderness.   Musculoskeletal:      Right lower leg: Edema (trace) present.      Left lower leg: Edema (trace) present.   Skin:     Comments: Cold extremities   Neurological:      Comments: Sedated but following commands.  Squeezes both hands, wiggles toes         Fluids  No intake or output data in the 24 hours ending 01/11/20 0330    Laboratory  Recent Results (from the past 48 hour(s))   Triglycerides Starting now and then Every 3 Days    Collection Time: 01/10/20  6:30 PM   Result Value Ref Range    Triglycerides 125 0 - 149 mg/dL   CBC WITH DIFFERENTIAL    Collection Time: 01/10/20  6:30 PM   Result Value Ref Range    WBC 15.7 (H) 4.8 - 10.8 K/uL    RBC 4.77 4.20 - 5.40 M/uL    Hemoglobin 12.3 12.0 - 16.0 g/dL    Hematocrit 41.4 37.0 - 47.0 %    MCV 86.8 81.4 - 97.8 fL    MCH 25.8 (L) 27.0 - 33.0 pg    MCHC 29.7 (L) 33.6 - 35.0 g/dL    RDW 50.0 35.9 - 50.0 fL    Platelet Count 383 164 - 446 K/uL    MPV 10.3 9.0 - 12.9 fL    Neutrophils-Polys 68.00 44.00 - 72.00 %    Lymphocytes 20.30 (L) 22.00 - 41.00 %    Monocytes 8.60 0.00 - 13.40 %    Eosinophils 1.40 0.00 - 6.90 %    Basophils 0.70 0.00 - 1.80 %    Immature Granulocytes 1.00 (H) 0.00 - 0.90 %    Nucleated RBC 0.00 /100 WBC    Neutrophils (Absolute) 10.64 (H) 2.00 - 7.15 K/uL    Lymphs (Absolute) 3.17 1.00 - 4.80 K/uL    Monos (Absolute) 1.35 (H) 0.00 - 0.85 K/uL    Eos (Absolute) 0.22 0.00 - 0.51 K/uL    Baso (Absolute) 0.11 0.00 - 0.12 K/uL    Immature Granulocytes (abs) 0.16 (H) 0.00 - 0.11 K/uL    NRBC (Absolute) 0.00 K/uL    Anisocytosis 1+     Microcytosis 1+    COMP METABOLIC PANEL    Collection Time: 01/10/20  6:30 PM   Result Value Ref Range    Sodium 134 (L) 135 - 145 mmol/L     Potassium 4.3 3.6 - 5.5 mmol/L    Chloride 100 96 - 112 mmol/L    Co2 18 (L) 20 - 33 mmol/L    Anion Gap 16.0 (H) 0.0 - 11.9    Glucose 310 (H) 65 - 99 mg/dL    Bun 19 8 - 22 mg/dL    Creatinine 1.08 0.50 - 1.40 mg/dL    Calcium 9.2 8.5 - 10.5 mg/dL    AST(SGOT) 24 12 - 45 U/L    ALT(SGPT) 18 2 - 50 U/L    Alkaline Phosphatase 96 30 - 99 U/L    Total Bilirubin 0.9 0.1 - 1.5 mg/dL    Albumin 3.7 3.2 - 4.9 g/dL    Total Protein 6.0 6.0 - 8.2 g/dL    Globulin 2.3 1.9 - 3.5 g/dL    A-G Ratio 1.6 g/dL   TROPONIN    Collection Time: 01/10/20  6:30 PM   Result Value Ref Range    Troponin T 20 (H) 6 - 19 ng/L   proBrain Natriuretic Peptide, NT    Collection Time: 01/10/20  6:30 PM   Result Value Ref Range    NT-proBNP 57542 (H) 0 - 125 pg/mL   LACTIC ACID    Collection Time: 01/10/20  6:30 PM   Result Value Ref Range    Lactic Acid 6.5 (HH) 0.5 - 2.0 mmol/L   ESTIMATED GFR    Collection Time: 01/10/20  6:30 PM   Result Value Ref Range    GFR If African American >60 >60 mL/min/1.73 m 2    GFR If Non African American 50 (A) >60 mL/min/1.73 m 2   PERIPHERAL SMEAR REVIEW    Collection Time: 01/10/20  6:30 PM   Result Value Ref Range    Peripheral Smear Review see below    PLATELET ESTIMATE    Collection Time: 01/10/20  6:30 PM   Result Value Ref Range    Plt Estimation Normal    MORPHOLOGY    Collection Time: 01/10/20  6:30 PM   Result Value Ref Range    RBC Morphology Present     Poikilocytosis 1+     Ovalocytes 1+    DIFFERENTIAL COMMENT    Collection Time: 01/10/20  6:30 PM   Result Value Ref Range    Comments-Diff see below    PROCALCITONIN    Collection Time: 01/10/20  6:30 PM   Result Value Ref Range    Procalcitonin <0.05 <0.25 ng/mL   URINALYSIS CULTURE, IF INDICATED    Collection Time: 01/10/20  6:50 PM   Result Value Ref Range    Color Yellow     Character Turbid (A)     Specific Gravity 1.018 <1.035    Ph 5.5 5.0 - 8.0    Glucose Negative Negative mg/dL    Ketones Negative Negative mg/dL    Protein 300 (A)  Negative mg/dL    Bilirubin Negative Negative    Urobilinogen, Urine 1.0 Negative    Nitrite Negative Negative    Leukocyte Esterase Moderate (A) Negative    Occult Blood Moderate (A) Negative    Micro Urine Req Microscopic    URINE MICROSCOPIC (W/UA)    Collection Time: 01/10/20  6:50 PM   Result Value Ref Range    WBC Packed (A) /hpf    RBC 5-10 (A) /hpf    Bacteria Many (A) None /hpf    Epithelial Cells Negative /hpf    Hyaline Cast 3-5 (A) /lpf   URINE DRUG SCREEN    Collection Time: 01/10/20  7:31 PM   Result Value Ref Range    Amphetamines Urine Negative Negative    Barbiturates Negative Negative    Benzodiazepines Negative Negative    Cocaine Metabolite Negative Negative    Methadone Negative Negative    Opiates Negative Negative    Oxycodone Negative Negative    Phencyclidine -Pcp Negative Negative    Propoxyphene Negative Negative    Cannabinoid Metab Negative Negative   EKG (NOW)    Collection Time: 01/10/20  8:02 PM   Result Value Ref Range    Report       [facility] Emergency Dept[facility]    Test Date:  2020-01-10  Pt Name:    VALENTINE MICHELLE                 Department: ER  MRN:        [id number]                      Room:       [facility]  Gender:     Female                       Technician: [id number]  :        [id number]                   Requested By:RAUL MOLINA  Order #:    [id number]                    Reading MD:    Measurements  Intervals                                Axis  Rate:       84                           P:          19  NC:         140                          QRS:        40  QRSD:       86                           T:          112  QT:         384  QTc:        454    Interpretive Statements  SINUS RHYTHM  PROBABLE LEFT ATRIAL ABNORMALITY  PROBABLE LVH WITH SECONDARY REPOL ABNRM  Compared to ECG 2017 12:25:26  Myocardial infarct finding no longer present  Possible ischemia no longer present     Influenza A/B By PCR (Adult - Flu Only)    Collection Time: 01/10/20  9:48 PM    Result Value Ref Range    Influenza virus A RNA Negative Negative    Influenza virus B, PCR Negative Negative   LACTIC ACID    Collection Time: 01/11/20 12:19 AM   Result Value Ref Range    Lactic Acid 1.1 0.5 - 2.0 mmol/L       Imaging  DX-CHEST-FOR LINE PLACEMENT Perform procedure in: Patient's Room   Final Result         1.  Pulmonary edema and/or infiltrates are identified, which are stable since the prior exam.   2.  Cardiomegaly   3.  Atherosclerosis      CT-HEAD W/O   Final Result         1.  No acute intracranial abnormality is identified, there are nonspecific white matter changes, commonly associated with small vessel ischemic disease.  Associated mild cerebral atrophy is noted.   2.  Atherosclerosis.      DX-CHEST-PORTABLE (1 VIEW)   Final Result         1. No focal consolidation or pleural effusions.   2. Emphysema.   3. Well-positioned ETT and NGT.   4. Mild bilateral interstitial prominence, which may be related to mild edema or underlying emphysema.      EC-ECHOCARDIOGRAM COMPLETE W/O CONT    (Results Pending)       Assessment/Plan  * Acute respiratory failure with hypoxia (HCC)- (present on admission)  Assessment & Plan  Intubated for respiratory distress/AMS per ER notes  Only requiring 8/40%  Lung protective ventilation  Sedation light  Hopefully can extubate in the morning    Septic shock (HCC)  Assessment & Plan  This is Septic shock Present on admission  SIRS criteria identified on my evaluation include: Tachycardia, with heart rate greater than 90 BPM, Tachypnea, with respirations greater than 20 per minute and Leukocyosis, with WBC greater than 12,000  Source is urinary vs pulmonary  Presentation includes: Severe sepsis present, persistent hypotension after 30 ml/kg completed, and initial lactate level result is >= 4 mmol/L.   Despite appropriate fluid resuscitation with crystalloid given per sepsis guidelines, the patient remains hypotensive with systolic blood pressure less than 90 or MAP  less than 65  Hemodynamic support with additional fluids and IV vasopressors as needed to maintain a SBP of 90 or MAP of 65  IV antibiotics as appropriate for source of sepsis  Reassessment: I have reassessed the patient's hemodynamic status    Urinary +/- PNA with RLL infiltrate  CTX, azithro  LV initially looked normal function with small and collapsing IVC, then function appeared ~30% after fluid resuscitation so no additional fluid given  S/p bronch, UC pending      Community acquired pneumonia  Assessment & Plan  Apparently hospitalized recently for CHF, but we don't have additional history about this  DRIP score 2 based on history we have  Treating CAP not resistant organisms  Flu neg      Encephalopathy acute  Assessment & Plan  Probably sepsis related   CTH reassuring  No neck stiffness concerning for meningitis  Following commands on propofol    Dyspnea  Assessment & Plan  History obtained by ERP was 1 hour of acute onet dyspnea.  This is concerning for PE or MI.  Low suspicion for PE given alternate etiologies and normal appearing RV on echo. MI certainly possible.  No STEMI, but + lateral and high lateral STD. Trend trops.  Probably related to her sepsis +/- PNA.  S. pneumo can come on quickly    CHF (congestive heart failure) ([facility])  Assessment & Plan  Normal EF 2017  Initially normal EF on bedside TTE, then after fluids EF appeared about 30%  Formal TTE in am  No additional fluid now  No BB or ACE given shock  BP high    COPD (chronic obstructive pulmonary disease) ([facility])  Assessment & Plan  Not wheezing on exam  duonebs  No indication for prednisone    DM (diabetes mellitus) ([facility])- (present on admission)  Assessment & Plan  SSI    CAD (coronary artery disease)- (present on admission)  Assessment & Plan  No ASA, statin on home meds list      Discussed patient condition and risk of morbidity and/or mortality with RN, RT, [employer] resident and Patient.    The patient remains critically ill with septic shock  and respiratory failure on the vent and on pressors.  Critical care time = 81 minutes in directly providing and coordinating critical care and extensive data review.  No time overlap and excludes procedures.

## 2020-01-11 NOTE — PROGRESS NOTES
Dr Campo at bedside to perform central line placement and bronchoscopy. Time out performed for central line placement at 0119 and time out performed for bronchoscopy at 0132. X-ray called after bronchoscopy to verify central line placement.

## 2020-01-11 NOTE — CARE PLAN
Problem: Safety  Goal: Will remain free from injury  Outcome: PROGRESSING AS EXPECTED  Note:   Patient has soft wrist restraints in place.     Problem: Respiratory:  Goal: Respiratory status will improve  Outcome: PROGRESSING SLOWER THAN EXPECTED  Note:   Patient intubated upon arrival to the ED. Bronchoscopy completed on the unit.

## 2020-01-11 NOTE — CARE PLAN
Problem: Ventilation Defect:  Goal: Ability to achieve and maintain unassisted ventilation or tolerate decreased levels of ventilator support  Outcome: PROGRESSING AS EXPECTED      Ventilator Daily Summary    Vent Day # 2    Ventilator settings changed this shift: Not at this time     Weaning trials: Not at this time     Respiratory Procedures: Not at this time     Plan: Continue current ventilator settings and wean mechanical ventilation as tolerated per physician orders.

## 2020-01-11 NOTE — DIETARY
"Nutrition Support Assessment   Day 1 of admit.  Lacey Her is a 74 y.o. female with admitting DX of Acute respiratory failure (HCC)      Current problem list:  1. Sepsis, likely CAP  2. Intubated in ED  3. COPD, CHF, DM (metformin)  4. Recent admit @ another hospital for CHF exacerbation     Assessment:  Estimated Nutritional Needs based on:   Height: 165.1 cm (5' 5\")  Weight: 74.8 kg (164 lb 14.5 oz)  Ideal Body Weight: 56.7 kg (125 lb)  Percent Ideal Body Weight: 131.9  Body mass index is 27.44 kg/m²., BMI classification: Overweight    Calculation/Equation: REE per MSJ x1.2 = 1506 kcal/day; RMR per PSU (vent L/min 6.3, T max/24 hours 37.4) = 1433 kcal/day  Total Calories / day: 1400 - 1600 (Calories / k - 21)  Total Grams Protein / day: 90 - 112 (Grams Protein / k.2 - 1.5)     Evaluation:   1. Pt is intubated and unable to take PO diet.  2. Labs reviewed  3. Meds include propofol @ 15 mcg/kg/min (7.1 ml/hr = 187 kcal/day), lasix, insulin, electrolyte replacement, levophed @ 5 mcg/min, and bowel regimen.  4. No wounds or BM noted.  5. High-protein, low-carbohydrate TF formula is indicated to meet needs.      Malnutrition Risk: None identified @ this time.      Recommendations/Plan:  1. Start Impact Peptide 1.5 @ 25 ml/hr and advance per protocol to goal rate (with and without propofol) 40 ml/hr to provide 1440 kcal (+/-Kcal from propofol), 90 grams protein, and 739 ml free water per day.  2. Fluids per MD.     RD following.    "

## 2020-01-11 NOTE — ASSESSMENT & PLAN NOTE
This is Septic shock Present on admission  SIRS criteria identified on my evaluation include: Tachycardia, with heart rate greater than 90 BPM, Tachypnea, with respirations greater than 20 per minute and Leukocyosis, with WBC greater than 12,000  Source is urinary vs pulmonary  Presentation includes: Severe sepsis present, persistent hypotension after 30 ml/kg completed, and initial lactate level result is >= 4 mmol/L.   Despite appropriate fluid resuscitation with crystalloid given per sepsis guidelines, the patient remains hypotensive with systolic blood pressure less than 90 or MAP less than 65  Hemodynamic support with additional fluids and IV vasopressors as needed to maintain a SBP of 90 or MAP of 65  IV antibiotics as appropriate for source of sepsis  Reassessment: I have reassessed the patient's hemodynamic status    Urinary +/- PNA with RLL infiltrate, follow-up x-ray looks like improving pulmonary edema  CTX, azithro-plan 5-day course stop 1/15  LV initially looked normal function with small and collapsing IVC, then function appeared ~30% after fluid resuscitation so no additional fluid given, formal echo reveals EF 30% with focal wall motion abnormalities  S/p bronch, UC negative so far  Continue postresuscitation diuresis

## 2020-01-11 NOTE — CARE PLAN
Problem: Safety  Goal: Will remain free from injury  Outcome: PROGRESSING AS EXPECTED  Intervention: Provide assistance with mobility  Note:   Pt place in bilateral soft wrist restraints. Pt provided education on safety, fall prevention, and use of call light. Treaded socks in place, call light within reach, hourly rounding in effect.        Problem: Fluid Volume:  Goal: Will maintain balanced intake and output  Outcome: PROGRESSING AS EXPECTED  Intervention: Monitor, educate, and encourage compliance with therapeutic intake of liquids  Note:   Pt fluid status monitored with strict I/O documentation, assessment of skin integrity, edema and lung sounds.

## 2020-01-11 NOTE — ASSESSMENT & PLAN NOTE
SSI  Blood sugars testing will be changed from every 6 to AC/at bedtime  Hypoglycemia protocols  Blood sugars well controlled now

## 2020-01-11 NOTE — ASSESSMENT & PLAN NOTE
Possibly sepsis related, improved  CTH reassuring  No neck stiffness concerning for meningitis  Following commands on propofol 1/11-12, back to neurologic baseline off the ventilator 1/12

## 2020-01-11 NOTE — ASSESSMENT & PLAN NOTE
Treating CAP not resistant organisms  PCR Flu studies neg  Echo with EF 30%, down from 70% 2 years ago  X-ray better with diuresis  Suspect a component of COPD and pulmonary edema perhaps more likely than CAP, treating for all

## 2020-01-11 NOTE — ASSESSMENT & PLAN NOTE
This is Septic shock Present on admission  SIRS criteria identified on my evaluation include: Tachycardia, with heart rate greater than 90 BPM, Tachypnea, with respirations greater than 20 per minute and Leukocyosis, with WBC greater than 12,000  Source is UTI vs CAP  Presentation includes: Severe sepsis present, persistent hypotension after 30 ml/kg completed, and initial lactate level result is >= 4 mmol/L.   Despite appropriate fluid resuscitation with crystalloid given per sepsis guidelines, the patient remains hypotensive with systolic blood pressure less than 90 or MAP less than 65  Hemodynamic support with additional fluids and IV vasopressors as needed to maintain a SBP of 90 or MAP of 65  IV antibiotics as appropriate for source of sepsis  Reassessment: I have reassessed the patient's hemodynamic status    -Patient had (QSOFA 3/3, SIRS 3/4 on presentation) with encephalopathy and lactic acidosis, secondary to likely community acquired pneumonia versus urinary tract infection  -Resolved lactic acidosis, patient received bolus of fluids  -Patient is currently intubated and on mechanical ventilation day 3.  Following commands off sedation  -Blood culture negative so far  -Off pressor support  -Now resolved    Plan:  -Finish ceftriaxone, can likely DC tomorrow if cleared by PT  -Monitor input and output

## 2020-01-11 NOTE — ASSESSMENT & PLAN NOTE
-Per history obtained by ER physician, acute onset, 1 hour duration  -Bedside echocardiogram showed normal-appearing right ventricle, unlikely to be PE  -No STEMI.  Troponin trends not impressive.  MI to be unlikely  -Most likely sepsis related  -s/p intubation and on mechanical ventilation  -Extubation 1/12/2020

## 2020-01-12 ENCOUNTER — APPOINTMENT (OUTPATIENT)
Dept: RADIOLOGY | Facility: MEDICAL CENTER | Age: 75
DRG: 871 | End: 2020-01-12
Attending: STUDENT IN AN ORGANIZED HEALTH CARE EDUCATION/TRAINING PROGRAM
Payer: MEDICARE

## 2020-01-12 LAB
ACTION RANGE TRIGGERED IACRT: NO
ANION GAP SERPL CALC-SCNC: 10 MMOL/L (ref 0–11.9)
BASE EXCESS BLDA CALC-SCNC: 1 MMOL/L (ref -4–3)
BASOPHILS # BLD AUTO: 0.2 % (ref 0–1.8)
BASOPHILS # BLD: 0.02 K/UL (ref 0–0.12)
BODY TEMPERATURE: ABNORMAL DEGREES
BUN SERPL-MCNC: 21 MG/DL (ref 8–22)
CALCIUM SERPL-MCNC: 9.6 MG/DL (ref 8.5–10.5)
CHLORIDE SERPL-SCNC: 108 MMOL/L (ref 96–112)
CO2 BLDA-SCNC: 27 MMOL/L (ref 20–33)
CO2 SERPL-SCNC: 26 MMOL/L (ref 20–33)
CREAT SERPL-MCNC: 1.04 MG/DL (ref 0.5–1.4)
CRP SERPL HS-MCNC: 15.68 MG/DL (ref 0–0.75)
EOSINOPHIL # BLD AUTO: 0.03 K/UL (ref 0–0.51)
EOSINOPHIL NFR BLD: 0.3 % (ref 0–6.9)
ERYTHROCYTE [DISTWIDTH] IN BLOOD BY AUTOMATED COUNT: 50.7 FL (ref 35.9–50)
EST. AVERAGE GLUCOSE BLD GHB EST-MCNC: 117 MG/DL
GLUCOSE BLD-MCNC: 100 MG/DL (ref 65–99)
GLUCOSE BLD-MCNC: 113 MG/DL (ref 65–99)
GLUCOSE BLD-MCNC: 114 MG/DL (ref 65–99)
GLUCOSE BLD-MCNC: 125 MG/DL (ref 65–99)
GLUCOSE SERPL-MCNC: 104 MG/DL (ref 65–99)
HBA1C MFR BLD: 5.7 % (ref 0–5.6)
HCO3 BLDA-SCNC: 25.6 MMOL/L (ref 17–25)
HCT VFR BLD AUTO: 35.7 % (ref 37–47)
HGB BLD-MCNC: 10.8 G/DL (ref 12–16)
HOROWITZ INDEX BLDA+IHG-RTO: 211 MM[HG]
IMM GRANULOCYTES # BLD AUTO: 0.04 K/UL (ref 0–0.11)
IMM GRANULOCYTES NFR BLD AUTO: 0.4 % (ref 0–0.9)
INST. QUALIFIED PATIENT IIQPT: YES
LYMPHOCYTES # BLD AUTO: 1.1 K/UL (ref 1–4.8)
LYMPHOCYTES NFR BLD: 11.4 % (ref 22–41)
MAGNESIUM SERPL-MCNC: 2 MG/DL (ref 1.5–2.5)
MCH RBC QN AUTO: 26.1 PG (ref 27–33)
MCHC RBC AUTO-ENTMCNC: 30.3 G/DL (ref 33.6–35)
MCV RBC AUTO: 86.2 FL (ref 81.4–97.8)
MONOCYTES # BLD AUTO: 0.98 K/UL (ref 0–0.85)
MONOCYTES NFR BLD AUTO: 10.2 % (ref 0–13.4)
NEUTROPHILS # BLD AUTO: 7.45 K/UL (ref 2–7.15)
NEUTROPHILS NFR BLD: 77.5 % (ref 44–72)
NRBC # BLD AUTO: 0 K/UL
NRBC BLD-RTO: 0 /100 WBC
O2/TOTAL GAS SETTING VFR VENT: 35 %
PCO2 BLDA: 38.5 MMHG (ref 26–37)
PCO2 TEMP ADJ BLDA: 39.6 MMHG (ref 26–37)
PH BLDA: 7.43 [PH] (ref 7.4–7.5)
PH TEMP ADJ BLDA: 7.42 [PH] (ref 7.4–7.5)
PHOSPHATE SERPL-MCNC: 3.3 MG/DL (ref 2.5–4.5)
PLATELET # BLD AUTO: 250 K/UL (ref 164–446)
PMV BLD AUTO: 10.2 FL (ref 9–12.9)
PO2 BLDA: 74 MMHG (ref 64–87)
PO2 TEMP ADJ BLDA: 77 MMHG (ref 64–87)
POTASSIUM SERPL-SCNC: 3.3 MMOL/L (ref 3.6–5.5)
PREALB SERPL-MCNC: 13 MG/DL (ref 18–38)
PROCALCITONIN SERPL-MCNC: 6.07 NG/ML
RBC # BLD AUTO: 4.14 M/UL (ref 4.2–5.4)
SAO2 % BLDA: 95 % (ref 93–99)
SODIUM SERPL-SCNC: 144 MMOL/L (ref 135–145)
SPECIMEN DRAWN FROM PATIENT: ABNORMAL
WBC # BLD AUTO: 9.6 K/UL (ref 4.8–10.8)

## 2020-01-12 PROCEDURE — 85025 COMPLETE CBC W/AUTO DIFF WBC: CPT

## 2020-01-12 PROCEDURE — 700111 HCHG RX REV CODE 636 W/ 250 OVERRIDE (IP): Performed by: STUDENT IN AN ORGANIZED HEALTH CARE EDUCATION/TRAINING PROGRAM

## 2020-01-12 PROCEDURE — 83036 HEMOGLOBIN GLYCOSYLATED A1C: CPT

## 2020-01-12 PROCEDURE — 83735 ASSAY OF MAGNESIUM: CPT

## 2020-01-12 PROCEDURE — 86140 C-REACTIVE PROTEIN: CPT

## 2020-01-12 PROCEDURE — 84100 ASSAY OF PHOSPHORUS: CPT

## 2020-01-12 PROCEDURE — A9270 NON-COVERED ITEM OR SERVICE: HCPCS | Performed by: INTERNAL MEDICINE

## 2020-01-12 PROCEDURE — 700102 HCHG RX REV CODE 250 W/ 637 OVERRIDE(OP): Performed by: STUDENT IN AN ORGANIZED HEALTH CARE EDUCATION/TRAINING PROGRAM

## 2020-01-12 PROCEDURE — 700102 HCHG RX REV CODE 250 W/ 637 OVERRIDE(OP)

## 2020-01-12 PROCEDURE — 99291 CRITICAL CARE FIRST HOUR: CPT | Performed by: INTERNAL MEDICINE

## 2020-01-12 PROCEDURE — 700105 HCHG RX REV CODE 258: Performed by: STUDENT IN AN ORGANIZED HEALTH CARE EDUCATION/TRAINING PROGRAM

## 2020-01-12 PROCEDURE — 36600 WITHDRAWAL OF ARTERIAL BLOOD: CPT

## 2020-01-12 PROCEDURE — 82803 BLOOD GASES ANY COMBINATION: CPT

## 2020-01-12 PROCEDURE — 700111 HCHG RX REV CODE 636 W/ 250 OVERRIDE (IP): Performed by: INTERNAL MEDICINE

## 2020-01-12 PROCEDURE — 82962 GLUCOSE BLOOD TEST: CPT | Mod: 91

## 2020-01-12 PROCEDURE — 94640 AIRWAY INHALATION TREATMENT: CPT

## 2020-01-12 PROCEDURE — A9270 NON-COVERED ITEM OR SERVICE: HCPCS | Performed by: STUDENT IN AN ORGANIZED HEALTH CARE EDUCATION/TRAINING PROGRAM

## 2020-01-12 PROCEDURE — 94150 VITAL CAPACITY TEST: CPT

## 2020-01-12 PROCEDURE — 80048 BASIC METABOLIC PNL TOTAL CA: CPT

## 2020-01-12 PROCEDURE — 71045 X-RAY EXAM CHEST 1 VIEW: CPT

## 2020-01-12 PROCEDURE — 94003 VENT MGMT INPAT SUBQ DAY: CPT

## 2020-01-12 PROCEDURE — A9270 NON-COVERED ITEM OR SERVICE: HCPCS

## 2020-01-12 PROCEDURE — 700102 HCHG RX REV CODE 250 W/ 637 OVERRIDE(OP): Performed by: INTERNAL MEDICINE

## 2020-01-12 PROCEDURE — 84145 PROCALCITONIN (PCT): CPT

## 2020-01-12 PROCEDURE — 700101 HCHG RX REV CODE 250: Performed by: STUDENT IN AN ORGANIZED HEALTH CARE EDUCATION/TRAINING PROGRAM

## 2020-01-12 PROCEDURE — 84134 ASSAY OF PREALBUMIN: CPT

## 2020-01-12 PROCEDURE — 770020 HCHG ROOM/CARE - TELE (206)

## 2020-01-12 RX ORDER — METOPROLOL TARTRATE 50 MG/1
50 TABLET, FILM COATED ORAL TWICE DAILY
Status: DISCONTINUED | OUTPATIENT
Start: 2020-01-12 | End: 2020-01-13

## 2020-01-12 RX ORDER — FUROSEMIDE 10 MG/ML
40 INJECTION INTRAMUSCULAR; INTRAVENOUS
Status: DISCONTINUED | OUTPATIENT
Start: 2020-01-13 | End: 2020-01-13

## 2020-01-12 RX ADMIN — RACEPINEPHRINE HYDROCHLORIDE 0.5 ML: 11.25 SOLUTION RESPIRATORY (INHALATION) at 12:00

## 2020-01-12 RX ADMIN — IPRATROPIUM BROMIDE AND ALBUTEROL SULFATE 3 ML: .5; 3 SOLUTION RESPIRATORY (INHALATION) at 06:35

## 2020-01-12 RX ADMIN — FAMOTIDINE 20 MG: 20 TABLET ORAL at 05:14

## 2020-01-12 RX ADMIN — SENNOSIDES AND DOCUSATE SODIUM 2 TABLET: 8.6; 5 TABLET ORAL at 05:16

## 2020-01-12 RX ADMIN — METOPROLOL TARTRATE 50 MG: 50 TABLET, FILM COATED ORAL at 13:12

## 2020-01-12 RX ADMIN — IPRATROPIUM BROMIDE AND ALBUTEROL SULFATE 3 ML: .5; 3 SOLUTION RESPIRATORY (INHALATION) at 23:24

## 2020-01-12 RX ADMIN — POTASSIUM BICARBONATE 50 MEQ: 978 TABLET, EFFERVESCENT ORAL at 08:25

## 2020-01-12 RX ADMIN — IPRATROPIUM BROMIDE AND ALBUTEROL SULFATE 3 ML: .5; 3 SOLUTION RESPIRATORY (INHALATION) at 03:50

## 2020-01-12 RX ADMIN — POTASSIUM BICARBONATE 50 MEQ: 978 TABLET, EFFERVESCENT ORAL at 16:58

## 2020-01-12 RX ADMIN — IPRATROPIUM BROMIDE AND ALBUTEROL SULFATE 3 ML: .5; 3 SOLUTION RESPIRATORY (INHALATION) at 15:04

## 2020-01-12 RX ADMIN — FAMOTIDINE 20 MG: 20 TABLET ORAL at 17:00

## 2020-01-12 RX ADMIN — IPRATROPIUM BROMIDE AND ALBUTEROL SULFATE 3 ML: .5; 3 SOLUTION RESPIRATORY (INHALATION) at 19:50

## 2020-01-12 RX ADMIN — CEFTRIAXONE SODIUM 2 G: 2 INJECTION, POWDER, FOR SOLUTION INTRAMUSCULAR; INTRAVENOUS at 22:14

## 2020-01-12 RX ADMIN — FUROSEMIDE 40 MG: 10 INJECTION, SOLUTION INTRAMUSCULAR; INTRAVENOUS at 05:14

## 2020-01-12 RX ADMIN — POLYETHYLENE GLYCOL 3350 1 PACKET: 17 POWDER, FOR SOLUTION ORAL at 20:00

## 2020-01-12 RX ADMIN — IPRATROPIUM BROMIDE AND ALBUTEROL SULFATE 3 ML: .5; 3 SOLUTION RESPIRATORY (INHALATION) at 10:08

## 2020-01-12 RX ADMIN — ENOXAPARIN SODIUM 40 MG: 100 INJECTION SUBCUTANEOUS at 05:14

## 2020-01-12 RX ADMIN — AZITHROMYCIN 500 MG: 250 TABLET, FILM COATED ORAL at 16:59

## 2020-01-12 RX ADMIN — FUROSEMIDE 40 MG: 10 INJECTION, SOLUTION INTRAMUSCULAR; INTRAVENOUS at 16:20

## 2020-01-12 RX ADMIN — RACEPINEPHRINE HYDROCHLORIDE 0.5 ML: 11.25 SOLUTION RESPIRATORY (INHALATION) at 10:39

## 2020-01-12 RX ADMIN — SENNOSIDES AND DOCUSATE SODIUM 2 TABLET: 8.6; 5 TABLET ORAL at 16:59

## 2020-01-12 ASSESSMENT — LIFESTYLE VARIABLES: EVER_SMOKED: YES

## 2020-01-12 ASSESSMENT — COPD QUESTIONNAIRES
DO YOU EVER COUGH UP ANY MUCUS OR PHLEGM?: NO/ONLY WITH OCCASIONAL COLDS OR INFECTIONS
DURING THE PAST 4 WEEKS HOW MUCH DID YOU FEEL SHORT OF BREATH: NONE/LITTLE OF THE TIME
HAVE YOU SMOKED AT LEAST 100 CIGARETTES IN YOUR ENTIRE LIFE: YES
COPD SCREENING SCORE: 5

## 2020-01-12 ASSESSMENT — PULMONARY FUNCTION TESTS: FVC: 1.6

## 2020-01-12 NOTE — PROGRESS NOTES
Report received from Sarah ARIAS. Patient intubated, off sedation, connected to monitors, lines and drips verified.

## 2020-01-12 NOTE — FLOWSHEET NOTE
Extubation    Cuff leak noted Yes    Stridor present No    O2 (LPM): 3     O2 Daily Delivery Respiratory : Nasal Cannula     Patient toleration PT tolerated well    RCP Complete? Yes    Events/Summary/Plan: Pt given Racemic EPI Tx.

## 2020-01-12 NOTE — RESPIRATORY CARE
Adult Ventilation Update    Total Vent Days: 2    Patient Lines/Drains/Airways Status    Active Airway     Name: Placement date: Placement time: Site: Days:    Airway ETT 7.5  01/10/20   1843   --  less than 1              In the last 24 hours, the patient tolerated SBT for 1:40 min on settings of 5/8             Plateau Pressure (Q Shift): 20 (01/11/20 0634)  Static Compliance (ml / cm H2O): 36.5 (01/11/20 1553)    Patient failed trials because of Barriers to Wean: FiO2 >60% or PEEP >10 CM H2O (01/11/20 0457)  Barriers to SBT Weaning Trial Stopped due to:: (apnea) (01/11/20 0634)  Length of Weaning Trial Length of Weaning Trial (Hours): 10 (01/11/20 0634)    Cough: Productive (01/11/20 1600)  Sputum Amount: Small (01/11/20 1600)  Sputum Color: Yellow;White (01/11/20 1600)  Sputum Consistency: Thick (01/11/20 1600)    Mobility  Level of Mobility: Level IV (01/11/20 1600)  Activity Performed: Edge of bed (01/11/20 1600)  Time Activity Tolerated: 5 min (01/11/20 1600)  Assistance: Assistance of Two or More (01/11/20 1600)  Ambulation Tolerance: Tolerates Well (01/11/20 1600)  Pt Calls for Assistance: No (01/11/20 1600)  Staff Present for Mobilization: RN;CNA (01/11/20 1600)  Reason Not Mobilized: Unstable condition (01/11/20 0400)  Mobilization Comments: Levo 6mcg, Newly Intubated (01/11/20 0400)    Events/Summary/Plan: parameters not obtained, pt began to vomit (01/11/20 2080)

## 2020-01-12 NOTE — PROGRESS NOTES
[facility] ICU Progress Note      Admit Date: 1/10/2020    Resident(s): Nancy Pickard  Attending: SONDRA HEARN/ Dr. Solis    Date & Time:   1/12/2020   11:51 AM       Patient ID:    Name:             Lacey Her     YOB: 1945  Age:                 74 y.o.  female   MRN:               [id number]    HPI:  Ms. Her, is a pleasant 74-year-old female with past medical history significant for type 2 diabetes mellitus, COPD, and STEMI s/p CABG, congestive heart failure, and hyperparathyroidism admitted on 1/10/2019 due to acute acute hypoxic respiratory failure secondary to presumed sepsis (UTI versus CAP) with worsening confusion resulting into intubation and mechanical ventilation to protect the airway.  Recently she was hospitalized at [facility] for heart failure exacerbation per EMS (exact details unknown) no family at bedside for further history.    Patient became hypotensive in the ER upon initiation of propofol, fluid bolus was given but due to her history of heart failure, bedside echo showing low EF, norepinephrine was started to support her blood pressure.    On admission, patient underwent therapeutic and diagnostic bronchoscopy, BAL sent for culture and sensitivity.  Central line was inserted for access, medication use     Consultants:  ICU:     Interval Events:  -Patient is intubated and is on mechanical ventilation.   -Patient underwent transthoracic echocardiogram on 1/11/2020 which showed akinetic mid distal septum, anterior wall and apex, severely reduced left ventricular systolic function with EF of 25 to 30%  - Off sedation, responding appropriately.  Passed the weaning trial and SBT.  Plan for extubation.  Patient was successfully extubated then developed some stridor which improved with racemic epinephrine  -Hypotension resolved, BP remains elevated, will consider restarting home medications as deemed appropriate  -Bradycardia resolved  -Full code.  Family discussions on goals of  "care    Review of Systems   Unable to perform ROS: Intubated     PHYSICAL EXAM    Vitals:    01/12/20 0901 01/12/20 1000 01/12/20 1008 01/12/20 1041   BP:       Pulse: 98  (!) 145 81   Resp:   15 (!) 7   Temp:       TempSrc:       SpO2: 99%  97% 94%   Weight:  75.5 kg (166 lb 7.2 oz)     Height:         Body mass index is 27.7 kg/m².  BP (!) 167/76   Pulse 81   Temp 37.1 °C (98.8 °F)   Resp (!) 7   Ht 1.651 m (5' 5\")   Wt 75.5 kg (166 lb 7.2 oz)   SpO2 94%   BMI 27.70 kg/m²   O2 therapy: Pulse Oximetry: 94 %, O2 (LPM): 3, O2 Delivery: Ventilator    Physical Exam   Constitutional: She is well-developed, well-nourished, and in no distress. She is intubated.   HENT:   Head: Normocephalic and atraumatic.   Eyes: Pupils are equal, round, and reactive to light.   Neck: Normal range of motion. Neck supple.   Cardiovascular: Normal rate, regular rhythm and normal heart sounds.   No murmur heard.  Pulmonary/Chest: Effort normal and breath sounds normal. She is intubated. No respiratory distress. She has no wheezes.   Abdominal: Soft. Bowel sounds are normal. She exhibits no distension.   Musculoskeletal: Normal range of motion.         General: No edema.   Neurological: She is alert.   Following commands appropriately   Skin: Skin is warm and dry.     Respiratory:  Aleman Vent Mode: APVCMV  Respiration: (!) 7, Pulse Oximetry: 94 %, O2 Daily Delivery Respiratory : Nasal Cannula    Chest Tube Drains:    Recent Labs     01/11/20  0408 01/12/20  0516   ISTATAPH 7.320* 7.431   ISTATAPCO2 46.1* 38.5*   ISTATAPO2 75 74   ISTATATCO2 25 27   MEXRDOD8ABJ 94 95   ISTATARTHCO3 23.7 25.6*   ISTATARTBE -3 1   ISTATTEMP 37.2 C 37.6 C   ISTATFIO2 60 35   ISTATSPEC Arterial Arterial   ISTATAPHTC 7.317* 7.422   [id number] 76 77       HemoDynamics:  Pulse: 81 Blood Pressure : (!) 167/76        Fluids:  Date 01/12/20 0700 - 01/13/20 0659   Shift 8355-3845 1609-0077 4200-7290 24 Hour Total   INTAKE   P.O. 0   0     P.O. 0   0   NG/GT " 40   40     Intake (mL) (Enteral Tube 20 Cortrak - Gastric 10 Fr. Right nare) 40   40   Shift Total 40   40   OUTPUT   Urine 525   525     Output (mL) (Urethral Catheter Temperature probe 16 Fr.) 525   525   Stool 0   0     Number of Times Stooled 0 x   0 x     Measurable Stool (mL) 0   0   Shift Total 525   525   NET -485   -485      Intake/Output Summary (Last 24 hours) at 2020 0850  Last data filed at 2020 0600  Gross per 24 hour   Intake 2522.98 ml   Output 550 ml   Net 1972.98 ml       Weight: 75.5 kg (166 lb 7.2 oz)  Body mass index is 27.7 kg/m².    Recent Labs     01/10/20  1830 01/11/20  0500 01/12/20  0526   SODIUM 134* 139 144   POTASSIUM 4.3 3.7 3.3*   CHLORIDE 100 109 108   CO2 18* 21 26   BUN 19 19 21   CREATININE 1.08 0.83 1.04   MAGNESIUM  --  1.8 2.0   PHOSPHORUS  --  4.1 3.3   CALCIUM 9.2 8.8 9.6       GI/Nutrition:  Recent Labs     01/10/20  1830 01/11/20  0500 01/12/20  0526   ALTSGPT 18 17  --    ASTSGOT 24 15  --    ALKPHOSPHAT 96 72  --    TBILIRUBIN 0.9 0.5  --    PREALBUMIN  --   --  13.0*   GLUCOSE 310* 113* 104*       Heme:  Recent Labs     01/10/20  1830 01/11/20  0500 01/12/20  0526   RBC 4.77 4.21 4.14*   HEMOGLOBIN 12.3 10.9* 10.8*   HEMATOCRIT 41.4 36.9* 35.7*   PLATELETCT 383 338 250       Infectious Disease:  Monitored Temp 2  Av.7 °C (99.8 °F)  Min: 37 °C (98.6 °F)  Max: 38.2 °C (100.8 °F)  Temp  Av.9 °C (100.2 °F)  Min: 37.1 °C (98.8 °F)  Max: 38.2 °C (100.8 °F)  Recent Labs     01/10/20  1830 01/11/20  0500 01/12/20  0526   WBC 15.7* 12.8* 9.6   NEUTSPOLYS 68.00 76.80* 77.50*   LYMPHOCYTES 20.30* 10.90* 11.40*   MONOCYTES 8.60 10.90 10.20   EOSINOPHILS 1.40 0.20 0.30   BASOPHILS 0.70 0.40 0.20   ASTSGOT 24 15  --    ALTSGPT 18 17  --    ALKPHOSPHAT 96 72  --    TBILIRUBIN 0.9 0.5  --        Meds:  • potassium bicarbonate  50 mEq     • racepinephrine  0.5 mL     • furosemide  40 mg     • azithromycin  500 mg     • enoxaparin (LOVENOX) injection  40 mg      • Pharmacy  1 Each     • acetaminophen  650 mg     • ondansetron  4 mg     • NORepinephrine (LEVOPHED) infusion  0-30 mcg/min Stopped (01/11/20 8743)   • ipratropium-albuterol  3 mL     • cefTRIAXone (ROCEPHIN) IV  2 g Stopped (01/11/20 2116)   • Respiratory Care per Protocol       • famotidine  20 mg      Or   • famotidine  20 mg     • senna-docusate  2 Tab      And   • polyethylene glycol/lytes  1 Packet      And   • magnesium hydroxide  30 mL      And   • bisacodyl  10 mg     • MD Alert...Adult ICU Electrolyte Replacement per Pharmacy       • lidocaine  1-2 mL     • fentaNYL  50 mcg      And   • fentaNYL  100 mcg      And   • fentaNYL   Stopped (01/12/20 0530)    And   • propofol  0-40 mcg/kg/min Stopped (01/11/20 1600)   • insulin lispro  1-6 Units      And   • dextrose 10% bolus  250 mL          Procedures:  Intubation 1/10/2020 in the field  Extubation 1/12/2020  Right IJ central line insertion 1/10/2020  Bronchoscopy 1/11/2020    Imaging:  DX-CHEST-PORTABLE (1 VIEW)   Final Result      No significant interval change.      DX-ABDOMEN FOR TUBE PLACEMENT   Final Result      1.  Feeding tube tip overlies expected location of the gastric body.      EC-ECHOCARDIOGRAM COMPLETE W/ CONT   Final Result      DX-CHEST-FOR LINE PLACEMENT Perform procedure in: Patient's Room   Final Result         1.  Pulmonary edema and/or infiltrates are identified, which are stable since the prior exam.   2.  Cardiomegaly   3.  Atherosclerosis      CT-HEAD W/O   Final Result         1.  No acute intracranial abnormality is identified, there are nonspecific white matter changes, commonly associated with small vessel ischemic disease.  Associated mild cerebral atrophy is noted.   2.  Atherosclerosis.      DX-CHEST-PORTABLE (1 VIEW)   Final Result         1. No focal consolidation or pleural effusions.   2. Emphysema.   3. Well-positioned ETT and NGT.   4. Mild bilateral interstitial prominence, which may be related to mild edema or  underlying emphysema.          Problem and Plan:    * Acute respiratory failure with hypoxia (HCC)- (present on admission)  Assessment & Plan  Assessment: Patient presented with relatively rapid onset of acute hypoxic respiratory failure. Does not appear to be sufficient pulmonary edema to explain her respiratory failure and confusion. BNP was elevated however IVC collapsible and not clinically volume overloaded without edema or JVD. Potentially secondary to a community-acquired pneumonia, she was recently hospitalized and this is somewhat at risk for resistant organisms. S/P bronchoscopy, BAL sent for culture and sensitivity. Somewhat less likely could be secondary to COPD exacerbation, however wheezing is not a prominent feature of her presentation although minimally present and this does not usually present so quickly.  Bedside ultrasound was used to assess for likelihood of pulmonary embolism, right ventricle is roughly normal in size and function and IVC is collapsible.  -Patient was intubated in the field  -She passed her readiness testing and SBT for weaning    Plan:  -Extubation  -Continue ceftriaxone and azithromycin  -Follow-up BAL culture and sensitivity  -Goals of care discussion with the family  -Duo nebs every 4 hours    Septic shock (HCC)  Assessment & Plan  This is Septic shock Present on admission  SIRS criteria identified on my evaluation include: Tachycardia, with heart rate greater than 90 BPM, Tachypnea, with respirations greater than 20 per minute and Leukocyosis, with WBC greater than 12,000  Source is UTI vs CAP  Presentation includes: Severe sepsis present, persistent hypotension after 30 ml/kg completed, and initial lactate level result is >= 4 mmol/L.   Despite appropriate fluid resuscitation with crystalloid given per sepsis guidelines, the patient remains hypotensive with systolic blood pressure less than 90 or MAP less than 65  Hemodynamic support with additional fluids and IV  vasopressors as needed to maintain a SBP of 90 or MAP of 65  IV antibiotics as appropriate for source of sepsis  Reassessment: I have reassessed the patient's hemodynamic status    -Patient had (QSOFA 3/3, SIRS 3/4 on presentation) with encephalopathy and lactic acidosis, secondary to likely community acquired pneumonia versus urinary tract infection  -Resolved lactic acidosis, patient received bolus of fluids  -Patient is currently intubated and on mechanical ventilation day 3.  Following commands off sedation  -Blood culture negative so far  -Off pressor support    Plan:  -Extubation  -Continue Ceftriaxone/azithromycin  -Follow-up blood and urine culture  -Follow-up BAL culture and sensitivity  -Monitor input and output  -Goals of care discussion    CHF (congestive heart failure) ([facility])  Assessment & Plan  -Last echocardiogram in 2017 showed EF of 70%.  Bedside echocardiogram showed low EF.  Elevated BNP on admission.  Chest x-ray concerning for interstitial edema.  -TTE showed EF 30%, Akinetic mid-distal septum, anterior wall and apex. Severely reduced left ventricular systolic function.  -Continue Lasix 40 mg twice daily  -Potassium replacement  -Goals of care discussion    Community acquired pneumonia  Assessment & Plan  Assessment: Patient presented with early RLL infiltrate, leukocytosis, and acute hypoxic respiratory failure consistent with CAP. Recent admission increases the risk of resistant organisms.  Treating for community-acquired pneumonia and not resistant organisms. Procalcitonin negative.  Flu negative, leukocytosis improving, lactic acidosis resolved.  -Elevated procalcitonin level    Plan:  -Continue antibiotics ceftriaxone and azithromycin  -Follow-up blood, urine and BAL culture and sensitivity    Encephalopathy acute  Assessment & Plan  -CT head negative for any acute abnormality  -Most likely sepsis related  -No neck stiffness on presentation  -Improved.  Alert and oriented.    COPD (chronic  obstructive pulmonary disease) ([facility])  Assessment & Plan  -Does not appear to be on exacerbation.  No wheezing on examination  -RT per protocol  -DuoNeb every 4 hourly/as needed  -No indication for prednisone    DM (diabetes mellitus) ([facility])- (present on admission)  Assessment & Plan  -SSI/Accu-Chek/hypoglycemia protocol    CAD (coronary artery disease)- (present on admission)  Assessment & Plan  -No aspirin or statin on home med list  -Check lipid panel  -Follow-up TTE  -Consider starting aspirin and statin      DISPO: Remains critically ill requiring ICU level of care.     CODE STATUS: Full    Quality Measures:  Stevens Catheter:  Yes for accurate input and output measurements  DVT Prophylaxis:  Lovenox  Ulcer Prophylaxis:  Famotidine  Antibiotics: Ceftriaxone and Azithromycin  Lines: PIV, Right IJ central line

## 2020-01-12 NOTE — CARE PLAN
Respiratory Therapy Update    Interdisciplinary Plan of Care-Goals (Indications)  Bronchodilator Indications: Strong Subjective / Objective Improvement         #SVN Performed: Yes       O2 (LPM): 3 (01/12/20 1505)    O2 Daily Delivery Respiratory : Nasal Cannula    Breath Sounds    RUL Breath Sounds: Stridor   RML Breath Sounds: Stridor   RLL Breath Sounds: Transmitted Upper Airway Sound   YUMIKO Breath Sounds: Stridor   LLL Breath Sounds: Transmitted Upper Airway Sound     Events/Summary/Plan: PT is resting well at this time.

## 2020-01-12 NOTE — CARE PLAN
Problem: Communication  Goal: The ability to communicate needs accurately and effectively will improve  Outcome: PROGRESSING AS EXPECTED  Note:   Patient able to nod or shake head to verbal understanding of what is said to her.     Problem: Respiratory:  Goal: Respiratory status will improve  Outcome: PROGRESSING AS EXPECTED  Note:   Patient pulled good parameters after spontaneous breathing trial this am.

## 2020-01-12 NOTE — PROGRESS NOTES
Critical Care Progress Note    Date of admission  1/10/2020    Chief Complaint  74 y.o. female who presented 1/10/2020 with respiratory failure and encephalopathy.  I attempted to call the patient's sister for history however the person who answered at that phone number did not know the patient.  Apparently she has a  but we do not have contact information for him.       Per ERP she had an hour onset of dyspnea so EMS was called.  In the ER she was in respiratory distress and encephalopathic so was intubated.  No additional history available.      POCUS shows normal biventricular function, small collapsing IVC, no significant valvular regurgitation, a couple scattered B-lines but not significant pulmonary edema    Hospital Course          Interval Problem Update  Reviewed last 24 hour events:  Alert  On ventilator  Prop and fentanyl  sbt adequate  Xray improved  3 peripherals  moblize to edge of bed  Vent day 3  18/450/8/35  1.5 hour sbt  Parameters adequate  Extubate  Day 3/5 antibiotics  Cef/azithro  Scheduled lasix    Addendum  Tolerated extubation  Multiple reassessments  Reduced dosing for 40 mg daily starting tomorrow      Review of Systems  Review of Systems   Unable to perform ROS: Intubated        Vital Signs for last 24 hours   Temp:  [37.1 °C (98.8 °F)-38.4 °C (101.1 °F)] 37.1 °C (98.8 °F)  Pulse:  [52-92] 75  Resp:  [12-25] 12  BP: ()/(42-75) 161/75  SpO2:  [91 %-100 %] 98 %    Hemodynamic parameters for last 24 hours       Respiratory Information for the last 24 hours  Aleman Vent Mode: APVCMV  Rate (breaths/min): 18  Vt Target (mL): 450  PEEP/CPAP: 8  FiO2: 35  P Support: 5  P MEAN: 11  Length of Weaning Trial (Hours): 1.5  Control VTE (exp VT): 452    Physical Exam   Physical Exam  Vitals signs and nursing note reviewed.   Constitutional:       General: She is not in acute distress.     Appearance: She is ill-appearing and toxic-appearing. She is not diaphoretic.   HENT:      Head:  Normocephalic and atraumatic.      Right Ear: External ear normal.      Left Ear: External ear normal.      Nose: No congestion or rhinorrhea.      Mouth/Throat:      Mouth: Mucous membranes are dry.      Pharynx: No oropharyngeal exudate or posterior oropharyngeal erythema.   Eyes:      General: No scleral icterus.     Extraocular Movements: Extraocular movements intact.      Conjunctiva/sclera: Conjunctivae normal.      Pupils: Pupils are equal, round, and reactive to light.   Neck:      Musculoskeletal: Neck supple. No neck rigidity or muscular tenderness.   Cardiovascular:      Rate and Rhythm: Regular rhythm. Tachycardia present.      Pulses: Normal pulses.      Heart sounds: Normal heart sounds. No murmur.   Pulmonary:      Effort: Respiratory distress present.      Breath sounds: No wheezing.   Abdominal:      General: There is no distension.      Palpations: There is no mass.      Tenderness: There is no tenderness. There is no guarding.   Musculoskeletal:         General: No swelling or tenderness.      Right lower leg: No edema.      Left lower leg: No edema.   Lymphadenopathy:      Cervical: No cervical adenopathy.   Skin:     Coloration: Skin is not jaundiced or pale.      Findings: No bruising, erythema, lesion or rash.   Neurological:      General: No focal deficit present.      Mental Status: She is alert.      Cranial Nerves: No cranial nerve deficit.      Sensory: No sensory deficit.      Motor: No weakness.      Coordination: Coordination normal.      Deep Tendon Reflexes: Reflexes normal.      Comments: sedated         Medications  Current Facility-Administered Medications   Medication Dose Route Frequency Provider Last Rate Last Dose   • potassium bicarbonate (KLYTE) effervescent tablet 50 mEq  50 mEq Enteral Tube BID Nancy Pickard M.D.       • furosemide (LASIX) injection 40 mg  40 mg Intravenous BID DIURETIC Rusty Solis M.D.   40 mg at 01/12/20 0514   • azithromycin (ZITHROMAX) tablet 500  mg  500 mg Enteral Tube Q EVENING Rusty Solis M.D.   500 mg at 01/11/20 1716   • enoxaparin (LOVENOX) inj 40 mg  40 mg Subcutaneous DAILY Nancy Pickard M.D.   40 mg at 01/12/20 0514   • Pharmacy Consult: Enteral tube insertion - review meds/change route/product selection  1 Each Other PHARMACY TO DOSE Nancy Pickard M.D.       • acetaminophen (TYLENOL) tablet 650 mg  650 mg Enteral Tube Q4HRS PRN Nancy Pickard M.D.   650 mg at 01/11/20 1514   • ondansetron (ZOFRAN) syringe/vial injection 4 mg  4 mg Intravenous Q4HRS PRN Rusty Solis M.D.   4 mg at 01/11/20 1715   • norepinephrine (LEVOPHED) 8 mg in  mL Infusion  0-30 mcg/min Intravenous Continuous Sharad Castillo M.D.   Stopped at 01/11/20 1553   • ipratropium-albuterol (DUONEB) nebulizer solution  3 mL Nebulization Q4HRS (RT) Sharad Castillo M.D.   3 mL at 01/12/20 0635   • cefTRIAXone (ROCEPHIN) 2 g in  mL IVPB  2 g Intravenous Q24HRS Sharad Castillo M.D.   Stopped at 01/11/20 2116   • Respiratory Care per Protocol   Nebulization Continuous RT Sharad Castillo M.D.       • famotidine (PEPCID) tablet 20 mg  20 mg Enteral Tube Q12HRS Sharad Castillo M.D.   20 mg at 01/12/20 0514    Or   • famotidine (PEPCID) injection 20 mg  20 mg Intravenous Q12HRS Sharad Castillo M.D.   20 mg at 01/11/20 0452   • senna-docusate (PERICOLACE or SENOKOT S) 8.6-50 MG per tablet 2 Tab  2 Tab Enteral Tube BID Sharad Castillo M.D.   2 Tab at 01/12/20 0516    And   • polyethylene glycol/lytes (MIRALAX) PACKET 1 Packet  1 Packet Enteral Tube QDAY PRN Sharad Castillo M.D.        And   • magnesium hydroxide (MILK OF MAGNESIA) suspension 30 mL  30 mL Enteral Tube QDAY PRN Sharad Castillo M.D.        And   • bisacodyl (DULCOLAX) suppository 10 mg  10 mg Rectal QDAY PRN Sharad Castillo M.D.       • MD Alert...ICU Electrolyte Replacement per Pharmacy   Other PHARMACY TO DOSE Sharad Castillo M.D.       • lidocaine (XYLOCAINE) 1 % injection 1-2 mL  1-2 mL Tracheal Tube Q30  MIN PRN Sharad Castillo M.D.       • fentaNYL (SUBLIMAZE) injection 50 mcg  50 mcg Intravenous Q15 MIN PRN Sharad Castillo M.D.   50 mcg at 01/11/20 0116    And   • fentaNYL (SUBLIMAZE) injection 100 mcg  100 mcg Intravenous Q15 MIN PRN Sharad Castillo M.D.   100 mcg at 01/11/20 0137    And   • fentaNYL (SUBLIMAZE) 50 mcg/mL in 50mL (Continuous Infusion)   Intravenous Continuous Sharad Castillo M.D.   Stopped at 01/12/20 0530    And   • propofol (DIPRIVAN) injection  0-40 mcg/kg/min Intravenous Continuous Sharad Castillo M.D.   Stopped at 01/11/20 1600   • insulin lispro (HUMALOG) injection 1-6 Units  1-6 Units Subcutaneous Q6HRS Sharad Castillo M.D.   Stopped at 01/11/20 0000    And   • DEXTROSE 10% BOLUS 250 mL  250 mL Intravenous Q15 MIN PRN Sharad Castillo M.D.           Fluids    Intake/Output Summary (Last 24 hours) at 1/12/2020 0708  Last data filed at 1/12/2020 0600  Gross per 24 hour   Intake 546.14 ml   Output 2295 ml   Net -1748.86 ml       Laboratory  Recent Labs     01/11/20  0408 01/12/20  0516   ISTATAPH 7.320* 7.431   ISTATAPCO2 46.1* 38.5*   ISTATAPO2 75 74   ISTATATCO2 25 27   SBECEPA2VMV 94 95   ISTATARTHCO3 23.7 25.6*   ISTATARTBE -3 1   ISTATTEMP 37.2 C 37.6 C   ISTATFIO2 60 35   ISTATSPEC Arterial Arterial   ISTATAPHTC 7.317* 7.422   KESYKSJI0UA 76 77         Recent Labs     01/10/20  1830 01/11/20  0500 01/12/20  0526   SODIUM 134* 139 144   POTASSIUM 4.3 3.7 3.3*   CHLORIDE 100 109 108   CO2 18* 21 26   BUN 19 19 21   CREATININE 1.08 0.83 1.04   MAGNESIUM  --  1.8 2.0   PHOSPHORUS  --  4.1 3.3   CALCIUM 9.2 8.8 9.6     Recent Labs     01/10/20  1830 01/11/20  0500 01/12/20  0526   ALTSGPT 18 17  --    ASTSGOT 24 15  --    ALKPHOSPHAT 96 72  --    TBILIRUBIN 0.9 0.5  --    PREALBUMIN  --   --  13.0*   GLUCOSE 310* 113* 104*     Recent Labs     01/10/20  1830 01/11/20  0500 01/12/20  0526   WBC 15.7* 12.8* 9.6   NEUTSPOLYS 68.00 76.80* 77.50*   LYMPHOCYTES 20.30* 10.90* 11.40*   MONOCYTES 8.60  10.90 10.20   EOSINOPHILS 1.40 0.20 0.30   BASOPHILS 0.70 0.40 0.20   ASTSGOT 24 15  --    ALTSGPT 18 17  --    ALKPHOSPHAT 96 72  --    TBILIRUBIN 0.9 0.5  --      Recent Labs     01/10/20  1830 01/11/20  0500 01/12/20  0526   RBC 4.77 4.21 4.14*   HEMOGLOBIN 12.3 10.9* 10.8*   HEMATOCRIT 41.4 36.9* 35.7*   PLATELETCT 383 338 250       Imaging  X-Ray:  I have personally reviewed the images and compared with prior images.  EKG:  I have personally reviewed the images and compared with prior images.  CT:    Reviewed    Assessment/Plan  * Acute respiratory failure with hypoxia (HCC)- (present on admission)  Assessment & Plan  Septic shock from pna  Cardiac ? Poor ef, pending formal echo  Intubated  Rt/o2 protocols  Resuscitated, now diuresing    Septic shock (HCC)  Assessment & Plan  This is Septic shock Present on admission  SIRS criteria identified on my evaluation include: Tachycardia, with heart rate greater than 90 BPM, Tachypnea, with respirations greater than 20 per minute and Leukocyosis, with WBC greater than 12,000  Source is urinary vs pulmonary  Presentation includes: Severe sepsis present, persistent hypotension after 30 ml/kg completed, and initial lactate level result is >= 4 mmol/L.   Despite appropriate fluid resuscitation with crystalloid given per sepsis guidelines, the patient remains hypotensive with systolic blood pressure less than 90 or MAP less than 65  Hemodynamic support with additional fluids and IV vasopressors as needed to maintain a SBP of 90 or MAP of 65  IV antibiotics as appropriate for source of sepsis  Reassessment: I have reassessed the patient's hemodynamic status    Urinary +/- PNA with RLL infiltrate  CTX, azithro  LV initially looked normal function with small and collapsing IVC, then function appeared ~30% after fluid resuscitation so no additional fluid given  S/p bronch, UC pending  Post resuscitation diuresis started this am      Community acquired pneumonia  Assessment &  Plan  Apparently hospitalized recently for CHF, but we don't have additional history about this  DRIP score 2 based on history we have  Treating CAP not resistant organisms  Flu neg      Encephalopathy acute  Assessment & Plan  Probably sepsis related   CTH reassuring  No neck stiffness concerning for meningitis  Following commands on propofol    CHF (congestive heart failure) ([facility])  Assessment & Plan  Normal EF 2017  Initially normal EF on bedside TTE, then after fluids EF appeared about 30%  Formal TTE in am  No additional fluid now  No BB or ACE given shock  BP high    COPD (chronic obstructive pulmonary disease) ([facility])  Assessment & Plan  Not wheezing on exam  duonebs  No indication for prednisone    DM (diabetes mellitus) ([facility])- (present on admission)  Assessment & Plan  SSI    CAD (coronary artery disease)- (present on admission)  Assessment & Plan  No ASA, statin on home meds list       VTE:  Heparin  Ulcer: H2 Antagonist  Lines: Central Line  Ongoing indication addressed and Stevens Catheter  Ongoing indication addressed    I have performed a physical exam and reviewed and updated ROS and Plan today (1/12/2020). In review of yesterday's note (1/11/2020), there are no changes except as documented above.     Discussed patient condition and risk of morbidity and/or mortality with RN, RT, Pharmacy, , Code status disscussed, Charge nurse / hot rounds and Patient     The patient remains critically ill.  ON mechanical ventilator. ON pressors for map > 65 and sbp > 90.  On propofol and fentanyl for sedation.  SBT.  Extubated and tolerated well multiple reassessments.  Critical care time = 35 minutes in directly providing and coordinating critical care and extensive data review.  No time overlap and excludes procedures.

## 2020-01-12 NOTE — PROGRESS NOTES
Spoke with pt's niece Yolanda. Per yolanda, pt lives with her but is in the process of moving in with her son in [place]. Pt's jewelry given to Yolanda to take home. Clothing, glasses, hearing aids and dentures remain at bedside for pt's use.

## 2020-01-13 ENCOUNTER — APPOINTMENT (OUTPATIENT)
Dept: RADIOLOGY | Facility: MEDICAL CENTER | Age: 75
DRG: 871 | End: 2020-01-13
Attending: STUDENT IN AN ORGANIZED HEALTH CARE EDUCATION/TRAINING PROGRAM
Payer: MEDICARE

## 2020-01-13 PROBLEM — I50.20 HEART FAILURE WITH REDUCED EJECTION FRACTION (HCC): Status: ACTIVE | Noted: 2020-01-10

## 2020-01-13 PROBLEM — D50.9 IRON DEFICIENCY ANEMIA: Status: ACTIVE | Noted: 2020-01-13

## 2020-01-13 LAB
ALBUMIN SERPL BCP-MCNC: 3.3 G/DL (ref 3.2–4.9)
ALBUMIN/GLOB SERPL: 1.2 G/DL
ALP SERPL-CCNC: 76 U/L (ref 30–99)
ALT SERPL-CCNC: 12 U/L (ref 2–50)
ANION GAP SERPL CALC-SCNC: 11 MMOL/L (ref 0–11.9)
AST SERPL-CCNC: 10 U/L (ref 12–45)
BACTERIA SPEC RESP CULT: NORMAL
BASOPHILS # BLD AUTO: 0.5 % (ref 0–1.8)
BASOPHILS # BLD: 0.04 K/UL (ref 0–0.12)
BILIRUB SERPL-MCNC: 0.5 MG/DL (ref 0.1–1.5)
BUN SERPL-MCNC: 28 MG/DL (ref 8–22)
CALCIUM SERPL-MCNC: 10.4 MG/DL (ref 8.5–10.5)
CHLORIDE SERPL-SCNC: 106 MMOL/L (ref 96–112)
CO2 SERPL-SCNC: 30 MMOL/L (ref 20–33)
CREAT SERPL-MCNC: 0.86 MG/DL (ref 0.5–1.4)
CRP SERPL HS-MCNC: 8.7 MG/DL (ref 0–0.75)
EOSINOPHIL # BLD AUTO: 0.18 K/UL (ref 0–0.51)
EOSINOPHIL NFR BLD: 2.2 % (ref 0–6.9)
ERYTHROCYTE [DISTWIDTH] IN BLOOD BY AUTOMATED COUNT: 50.7 FL (ref 35.9–50)
FERRITIN SERPL-MCNC: 70.1 NG/ML (ref 10–291)
GLOBULIN SER CALC-MCNC: 2.8 G/DL (ref 1.9–3.5)
GLUCOSE SERPL-MCNC: 113 MG/DL (ref 65–99)
GRAM STN SPEC: NORMAL
HCT VFR BLD AUTO: 38.3 % (ref 37–47)
HGB BLD-MCNC: 11.5 G/DL (ref 12–16)
IMM GRANULOCYTES # BLD AUTO: 0.05 K/UL (ref 0–0.11)
IMM GRANULOCYTES NFR BLD AUTO: 0.6 % (ref 0–0.9)
IRON SATN MFR SERPL: 9 % (ref 15–55)
IRON SERPL-MCNC: 28 UG/DL (ref 40–170)
LYMPHOCYTES # BLD AUTO: 1.13 K/UL (ref 1–4.8)
LYMPHOCYTES NFR BLD: 13.8 % (ref 22–41)
MAGNESIUM SERPL-MCNC: 2.2 MG/DL (ref 1.5–2.5)
MCH RBC QN AUTO: 26 PG (ref 27–33)
MCHC RBC AUTO-ENTMCNC: 30 G/DL (ref 33.6–35)
MCV RBC AUTO: 86.7 FL (ref 81.4–97.8)
MONOCYTES # BLD AUTO: 0.89 K/UL (ref 0–0.85)
MONOCYTES NFR BLD AUTO: 10.9 % (ref 0–13.4)
NEUTROPHILS # BLD AUTO: 5.87 K/UL (ref 2–7.15)
NEUTROPHILS NFR BLD: 72 % (ref 44–72)
NRBC # BLD AUTO: 0 K/UL
NRBC BLD-RTO: 0 /100 WBC
PHOSPHATE SERPL-MCNC: 2.5 MG/DL (ref 2.5–4.5)
PLATELET # BLD AUTO: 287 K/UL (ref 164–446)
PMV BLD AUTO: 10.1 FL (ref 9–12.9)
POTASSIUM SERPL-SCNC: 3.6 MMOL/L (ref 3.6–5.5)
PREALB SERPL-MCNC: 12 MG/DL (ref 18–38)
PROT SERPL-MCNC: 6.1 G/DL (ref 6–8.2)
RBC # BLD AUTO: 4.42 M/UL (ref 4.2–5.4)
SIGNIFICANT IND 70042: NORMAL
SITE SITE: NORMAL
SODIUM SERPL-SCNC: 147 MMOL/L (ref 135–145)
SOURCE SOURCE: NORMAL
TIBC SERPL-MCNC: 322 UG/DL (ref 250–450)
WBC # BLD AUTO: 8.2 K/UL (ref 4.8–10.8)

## 2020-01-13 PROCEDURE — 700105 HCHG RX REV CODE 258

## 2020-01-13 PROCEDURE — 700101 HCHG RX REV CODE 250: Performed by: STUDENT IN AN ORGANIZED HEALTH CARE EDUCATION/TRAINING PROGRAM

## 2020-01-13 PROCEDURE — A9270 NON-COVERED ITEM OR SERVICE: HCPCS | Performed by: STUDENT IN AN ORGANIZED HEALTH CARE EDUCATION/TRAINING PROGRAM

## 2020-01-13 PROCEDURE — 770020 HCHG ROOM/CARE - TELE (206)

## 2020-01-13 PROCEDURE — A9270 NON-COVERED ITEM OR SERVICE: HCPCS | Performed by: INTERNAL MEDICINE

## 2020-01-13 PROCEDURE — 83550 IRON BINDING TEST: CPT

## 2020-01-13 PROCEDURE — 80053 COMPREHEN METABOLIC PANEL: CPT

## 2020-01-13 PROCEDURE — 85025 COMPLETE CBC W/AUTO DIFF WBC: CPT

## 2020-01-13 PROCEDURE — 700102 HCHG RX REV CODE 250 W/ 637 OVERRIDE(OP): Performed by: STUDENT IN AN ORGANIZED HEALTH CARE EDUCATION/TRAINING PROGRAM

## 2020-01-13 PROCEDURE — 99233 SBSQ HOSP IP/OBS HIGH 50: CPT | Mod: GC | Performed by: INTERNAL MEDICINE

## 2020-01-13 PROCEDURE — 82728 ASSAY OF FERRITIN: CPT

## 2020-01-13 PROCEDURE — 71045 X-RAY EXAM CHEST 1 VIEW: CPT

## 2020-01-13 PROCEDURE — 94664 DEMO&/EVAL PT USE INHALER: CPT

## 2020-01-13 PROCEDURE — 83735 ASSAY OF MAGNESIUM: CPT

## 2020-01-13 PROCEDURE — 86140 C-REACTIVE PROTEIN: CPT

## 2020-01-13 PROCEDURE — 700102 HCHG RX REV CODE 250 W/ 637 OVERRIDE(OP): Performed by: INTERNAL MEDICINE

## 2020-01-13 PROCEDURE — 700111 HCHG RX REV CODE 636 W/ 250 OVERRIDE (IP): Performed by: INTERNAL MEDICINE

## 2020-01-13 PROCEDURE — 84100 ASSAY OF PHOSPHORUS: CPT

## 2020-01-13 PROCEDURE — 99233 SBSQ HOSP IP/OBS HIGH 50: CPT | Performed by: INTERNAL MEDICINE

## 2020-01-13 PROCEDURE — 700111 HCHG RX REV CODE 636 W/ 250 OVERRIDE (IP): Performed by: STUDENT IN AN ORGANIZED HEALTH CARE EDUCATION/TRAINING PROGRAM

## 2020-01-13 PROCEDURE — 84134 ASSAY OF PREALBUMIN: CPT

## 2020-01-13 PROCEDURE — 700101 HCHG RX REV CODE 250: Performed by: INTERNAL MEDICINE

## 2020-01-13 PROCEDURE — 700105 HCHG RX REV CODE 258: Performed by: STUDENT IN AN ORGANIZED HEALTH CARE EDUCATION/TRAINING PROGRAM

## 2020-01-13 PROCEDURE — 94640 AIRWAY INHALATION TREATMENT: CPT

## 2020-01-13 PROCEDURE — 82962 GLUCOSE BLOOD TEST: CPT

## 2020-01-13 PROCEDURE — 83540 ASSAY OF IRON: CPT

## 2020-01-13 PROCEDURE — 99406 BEHAV CHNG SMOKING 3-10 MIN: CPT

## 2020-01-13 PROCEDURE — 92610 EVALUATE SWALLOWING FUNCTION: CPT

## 2020-01-13 RX ORDER — ASPIRIN 81 MG/1
81 TABLET, CHEWABLE ORAL DAILY
Status: DISCONTINUED | OUTPATIENT
Start: 2020-01-14 | End: 2020-01-15 | Stop reason: HOSPADM

## 2020-01-13 RX ORDER — CARVEDILOL 3.12 MG/1
3.12 TABLET ORAL 2 TIMES DAILY WITH MEALS
Status: DISCONTINUED | OUTPATIENT
Start: 2020-01-13 | End: 2020-01-15 | Stop reason: HOSPADM

## 2020-01-13 RX ORDER — AMOXICILLIN 250 MG
2 CAPSULE ORAL 2 TIMES DAILY
Status: DISCONTINUED | OUTPATIENT
Start: 2020-01-13 | End: 2020-01-15 | Stop reason: HOSPADM

## 2020-01-13 RX ORDER — METOPROLOL SUCCINATE 25 MG/1
100 TABLET, EXTENDED RELEASE ORAL EVERY EVENING
Status: DISCONTINUED | OUTPATIENT
Start: 2020-01-13 | End: 2020-01-13

## 2020-01-13 RX ORDER — IPRATROPIUM BROMIDE AND ALBUTEROL SULFATE 2.5; .5 MG/3ML; MG/3ML
3 SOLUTION RESPIRATORY (INHALATION)
Status: DISCONTINUED | OUTPATIENT
Start: 2020-01-13 | End: 2020-01-14

## 2020-01-13 RX ORDER — ATORVASTATIN CALCIUM 40 MG/1
40 TABLET, FILM COATED ORAL EVERY EVENING
Status: DISCONTINUED | OUTPATIENT
Start: 2020-01-13 | End: 2020-01-15 | Stop reason: HOSPADM

## 2020-01-13 RX ORDER — LISINOPRIL 5 MG/1
5 TABLET ORAL
Status: DISCONTINUED | OUTPATIENT
Start: 2020-01-13 | End: 2020-01-15 | Stop reason: HOSPADM

## 2020-01-13 RX ORDER — ASPIRIN 81 MG/1
81 TABLET, CHEWABLE ORAL DAILY
Status: DISCONTINUED | OUTPATIENT
Start: 2020-01-13 | End: 2020-01-13

## 2020-01-13 RX ORDER — SPIRONOLACTONE 25 MG/1
25 TABLET ORAL
Status: DISCONTINUED | OUTPATIENT
Start: 2020-01-13 | End: 2020-01-15 | Stop reason: HOSPADM

## 2020-01-13 RX ORDER — ACETAMINOPHEN 325 MG/1
650 TABLET ORAL EVERY 4 HOURS PRN
Status: DISCONTINUED | OUTPATIENT
Start: 2020-01-13 | End: 2020-01-15 | Stop reason: HOSPADM

## 2020-01-13 RX ORDER — SODIUM CHLORIDE 9 MG/ML
INJECTION, SOLUTION INTRAVENOUS
Status: COMPLETED
Start: 2020-01-13 | End: 2020-01-13

## 2020-01-13 RX ORDER — BISACODYL 10 MG
10 SUPPOSITORY, RECTAL RECTAL
Status: DISCONTINUED | OUTPATIENT
Start: 2020-01-13 | End: 2020-01-15 | Stop reason: HOSPADM

## 2020-01-13 RX ORDER — CINACALCET 30 MG/1
30 TABLET, FILM COATED ORAL DAILY
Status: DISCONTINUED | OUTPATIENT
Start: 2020-01-13 | End: 2020-01-15 | Stop reason: HOSPADM

## 2020-01-13 RX ORDER — POLYETHYLENE GLYCOL 3350 17 G/17G
1 POWDER, FOR SOLUTION ORAL
Status: DISCONTINUED | OUTPATIENT
Start: 2020-01-13 | End: 2020-01-15 | Stop reason: HOSPADM

## 2020-01-13 RX ORDER — FUROSEMIDE 20 MG/1
20 TABLET ORAL
Status: DISCONTINUED | OUTPATIENT
Start: 2020-01-14 | End: 2020-01-14

## 2020-01-13 RX ADMIN — CARVEDILOL 3.12 MG: 3.12 TABLET, FILM COATED ORAL at 17:54

## 2020-01-13 RX ADMIN — SPIRONOLACTONE 25 MG: 25 TABLET ORAL at 12:26

## 2020-01-13 RX ADMIN — CINACALCET HYDROCHLORIDE 30 MG: 30 TABLET, FILM COATED ORAL at 12:26

## 2020-01-13 RX ADMIN — SODIUM CHLORIDE 500 ML: 9 INJECTION, SOLUTION INTRAVENOUS at 21:41

## 2020-01-13 RX ADMIN — SENNOSIDES AND DOCUSATE SODIUM 2 TABLET: 8.6; 5 TABLET ORAL at 17:54

## 2020-01-13 RX ADMIN — ASPIRIN 81 MG 81 MG: 81 TABLET ORAL at 07:58

## 2020-01-13 RX ADMIN — IPRATROPIUM BROMIDE AND ALBUTEROL SULFATE 3 ML: .5; 3 SOLUTION RESPIRATORY (INHALATION) at 03:54

## 2020-01-13 RX ADMIN — SENNOSIDES AND DOCUSATE SODIUM 2 TABLET: 8.6; 5 TABLET ORAL at 05:37

## 2020-01-13 RX ADMIN — INSULIN LISPRO 1 UNITS: 100 INJECTION, SOLUTION INTRAVENOUS; SUBCUTANEOUS at 12:02

## 2020-01-13 RX ADMIN — METOPROLOL TARTRATE 50 MG: 50 TABLET, FILM COATED ORAL at 05:37

## 2020-01-13 RX ADMIN — ENOXAPARIN SODIUM 40 MG: 100 INJECTION SUBCUTANEOUS at 05:37

## 2020-01-13 RX ADMIN — IPRATROPIUM BROMIDE AND ALBUTEROL SULFATE 3 ML: .5; 3 SOLUTION RESPIRATORY (INHALATION) at 14:58

## 2020-01-13 RX ADMIN — IPRATROPIUM BROMIDE AND ALBUTEROL SULFATE 3 ML: .5; 3 SOLUTION RESPIRATORY (INHALATION) at 10:36

## 2020-01-13 RX ADMIN — CEFTRIAXONE SODIUM 2 G: 2 INJECTION, POWDER, FOR SOLUTION INTRAMUSCULAR; INTRAVENOUS at 21:05

## 2020-01-13 RX ADMIN — IPRATROPIUM BROMIDE AND ALBUTEROL SULFATE 3 ML: .5; 3 SOLUTION RESPIRATORY (INHALATION) at 08:18

## 2020-01-13 RX ADMIN — LISINOPRIL 5 MG: 5 TABLET ORAL at 10:18

## 2020-01-13 RX ADMIN — IPRATROPIUM BROMIDE AND ALBUTEROL SULFATE 3 ML: .5; 3 SOLUTION RESPIRATORY (INHALATION) at 23:45

## 2020-01-13 RX ADMIN — ATORVASTATIN CALCIUM 40 MG: 40 TABLET, FILM COATED ORAL at 17:53

## 2020-01-13 RX ADMIN — FUROSEMIDE 40 MG: 10 INJECTION, SOLUTION INTRAMUSCULAR; INTRAVENOUS at 05:36

## 2020-01-13 RX ADMIN — IPRATROPIUM BROMIDE AND ALBUTEROL SULFATE 3 ML: .5; 3 SOLUTION RESPIRATORY (INHALATION) at 19:53

## 2020-01-13 ASSESSMENT — ENCOUNTER SYMPTOMS
PALPITATIONS: 0
SORE THROAT: 0
ABDOMINAL PAIN: 0
SHORTNESS OF BREATH: 1
HEADACHES: 0
NERVOUS/ANXIOUS: 0
SPEECH CHANGE: 0
PND: 1
NAUSEA: 0
DIARRHEA: 0
BACK PAIN: 0
FEVER: 0
SPUTUM PRODUCTION: 0
DOUBLE VISION: 0
DIZZINESS: 0
HEMOPTYSIS: 0
VOMITING: 0
BRUISES/BLEEDS EASILY: 0
SENSORY CHANGE: 0
DIAPHORESIS: 0
FOCAL WEAKNESS: 0
COUGH: 1
ORTHOPNEA: 1
CHILLS: 0
WHEEZING: 0
BLURRED VISION: 0

## 2020-01-13 NOTE — CARE PLAN
Problem: Safety  Goal: Will remain free from injury  Outcome: PROGRESSING AS EXPECTED  Intervention: Provide assistance with mobility  Note:   Will mobilize patient with 1 person assist      Problem: Venous Thromboembolism (VTW)/Deep Vein Thrombosis (DVT) Prevention:  Goal: Patient will participate in Venous Thrombosis (VTE)/Deep Vein Thrombosis (DVT)Prevention Measures  Outcome: PROGRESSING AS EXPECTED  Intervention: Ensure patient wears graduated elastic stockings (DIONNA hose) and/or SCDs, if ordered, when in bed or chair (Remove at least once per shift for skin check)  Note:   SCDs are in place and in use

## 2020-01-13 NOTE — PROGRESS NOTES
[facility] ICU Transfer Note                                                                                         ICU Admit Date: 1/10/2020       ICU Discharge Date: ***        Primary Diagnosis: Sepsis, heart failure with reduced ejection fraction      ICU Course Summary (Brief Narrative):  Lacey Her is a 74 y.o. female with past medical history significant for type 2 diabetes mellitus, COPD, and NSTEMI s/p CABGx4, congestive heart failure(previous EF 70% 2017), and hyperparathyroidism admitted on 1/10/2019 due to acute hypoxic respiratory failure secondary to presumed sepsis (UTI versus CAP) with worsening confusion resulting into intubation and mechanical ventilation to protect the airway.  Recently she was hospitalized at [facility] for heart failure exacerbation per EMS (exact details unknown) no family at bedside for further history.     Patient became hypotensive in the ER upon initiation of propofol, fluid bolus was given but due to her history of heart failure, bedside echo showing low EF, norepinephrine was started to support her blood pressure and a central line was placed.     On admission, patient underwent therapeutic and diagnostic bronchoscopy, BAL sent for culture and sensitivity. She did well with antibiotics and diuretics; subsequent echocardiography showed decreased EF of 30% with focal wall motion abnormalities. Cardiology was consulted for her newly decreased EF and recommended medical management. She was extubated 1/12/20 and by 1/13/20 was started on a dysphagia diet.       Important Events in the ICU with relevant dates:  - Central Line: 1/10   - Intubation: 1/10-1/12   - Pressors: Levophed   - Tube feeding: Tolerating PO 1/13  - Antibiotics: Ceftriaxone/Azithromycin, last day 1/14  - Other procedures: Bronchoscopy     Labs and imaging studies to be continued with their indications:  - CBC: ***  - CMP or BMP: ***  - Magnesium: ***  - Phosphorus: ***   - Chest Xray: ***  - Other studies:  ***     Things to follow:  - ***      Sharad Castillo M.D.

## 2020-01-13 NOTE — RESPIRATORY CARE
"COPD EDUCATION by COPD CLINICAL EDUCATOR  1/13/2020  at  1:58 PM by Mary Jo Becker, RRT     Patient interviewed by COPD education team.  Patient unable to participate in full program.  Short intervention has been conducted.  A comprehensive packet including information about COPD. Educated patient on home treatments with spacer and cleaning with nebulizer. Patient reviewed by COPD education team. Smoking Cessation Intervention and education completed, 3 minutes spent on smoking cessation education with patient. Provided smoking cessation packet with \"Tips to Quit\" and flyer for \"Free Smoking Cessation Classes\".     "

## 2020-01-13 NOTE — PROGRESS NOTES
[facility] Progress Note      Admit Date: 1/10/2020    Resident(s): Sharad Castillo M.D.   Attending:  SONDRA HEARN/ Dr. Thakur    Patient ID:    Name:  Lacey Her     YOB: 1945  Age:  74 y.o.  female   MRN:  [id number]    Hospital Course (carried forward and updated):  Lacey Her is a 74 y.o. female with past medical history significant for type 2 diabetes mellitus, COPD, and NSTEMI s/p CABG, congestive heart failure, and hyperparathyroidism admitted on 1/10/2019 due to acute acute hypoxic respiratory failure secondary to presumed sepsis (UTI versus CAP) with worsening confusion resulting into intubation and mechanical ventilation to protect the airway.  Recently she was hospitalized at [facility] for heart failure exacerbation per EMS (exact details unknown) no family at bedside for further history.     Patient became hypotensive in the ER upon initiation of propofol, fluid bolus was given but due to her history of heart failure, bedside echo showing low EF, norepinephrine was started to support her blood pressure.     On admission, patient underwent therapeutic and diagnostic bronchoscopy, BAL sent for culture and sensitivity.  Central line was inserted for access, medication use     Consultants:  Critical Care     Interval Events:  Now extubated and near home O2 requirements, off sedation, hungry and thirsty. No f/c/productive cough, overall feeling pretty well.      Review of Systems   Constitutional: Negative for chills and fever.   HENT: Positive for hearing loss (chronic). Negative for tinnitus.    Eyes: Negative for blurred vision and double vision.   Cardiovascular: Negative for chest pain and leg swelling.   Gastrointestinal: Negative for nausea and vomiting.   Skin: Negative for itching and rash.   Neurological: Negative for dizziness and headaches.       PHYSICAL EXAM:  Vitals:    01/13/20 0600 01/13/20 0700 01/13/20 0800 01/13/20 0820   BP: (!) 178/79 148/81 157/72    Pulse: 84 78 (!)  "57 65   Resp: (!) 9 15 15 12   Temp:       TempSrc:       SpO2: 93% 95% 95% 93%   Weight:   74 kg (163 lb 2.3 oz)    Height:        Body mass index is 27.15 kg/m².  Latest Vitals:  /72   Pulse 65   Temp 37.1 °C (98.8 °F)   Resp 12   Ht 1.651 m (5' 5\")   Wt 74 kg (163 lb 2.3 oz)   SpO2 93%   BMI 27.15 kg/m²   O2 therapy: Pulse Oximetry: 93 %, O2 (LPM): 3, O2 Delivery: Silicone Nasal Cannula  Vitals Range last 24h:  Pulse:  [] 65  Resp:  [7-21] 12  BP: (140-182)/(49-98) 157/72  SpO2:  [90 %-99 %] 93 %  Date 01/13/20 0700 - 01/14/20 0659   Shift 6412-4609 5543-0134 0392-8995 24 Hour Total   INTAKE   P.O. 0   0     P.O. 0   0   Other 30   30     Medications (PO/Enteral Liquids) 30   30   NG/GT 80   80     Intake (mL) (Enteral Tube 01/11/20 Cortrak - Gastric 10 Fr. Right nare) 80   80   Shift Total 110   110   OUTPUT   Urine 450   450     Output (mL) (Urethral Catheter Temperature probe 16 Fr.) 450   450   Stool 0   0     Number of Times Stooled 0 x   0 x     Measurable Stool (mL) 0   0   Shift Total 450   450   NET -340   -340        Intake/Output Summary (Last 24 hours) at 1/13/2020 0838  Last data filed at 1/13/2020 0800  Gross per 24 hour   Intake 960 ml   Output 2425 ml   Net -1465 ml        Physical Exam   Constitutional: She is oriented to person, place, and time and well-developed, well-nourished, and in no distress.   HENT:   Head: Normocephalic and atraumatic.   Cardiovascular: Normal rate and regular rhythm. Exam reveals no gallop.   No murmur heard.  Pulmonary/Chest: Effort normal. She has wheezes (very mild, end-expiratory). She has no rales.   Abdominal: Soft. Bowel sounds are normal. She exhibits no distension. There is no tenderness.   Musculoskeletal:         General: No edema.   Neurological: She is alert and oriented to person, place, and time.   Follows commands   Skin: Skin is warm and dry.       Recent Labs     01/11/20  0408 01/12/20  0516   ISTATAPH 7.320* 7.431   ISTATAPCO2 " 46.1* 38.5*   ISTATAPO2 75 74   ISTATATCO2 25 27   [id number] 94 95   ISTATARTHCO3 23.7 25.6*   ISTATARTBE -3 1   ISTATTEMP 37.2 C 37.6 C   ISTATFIO2 60 35   ISTATSPEC Arterial Arterial   ISTATAPHTC 7.317* 7.422   [id number] 76 77     Recent Labs     01/10/20  1830 01/11/20  0500 01/12/20  0526   SODIUM 134* 139 144   POTASSIUM 4.3 3.7 3.3*   CHLORIDE 100 109 108   CO2 18* 21 26   BUN 19 19 21   CREATININE 1.08 0.83 1.04   MAGNESIUM  --  1.8 2.0   PHOSPHORUS  --  4.1 3.3   CALCIUM 9.2 8.8 9.6     Recent Labs     01/10/20  1830 01/11/20  0500 01/12/20  0526   ALTSGPT 18 17  --    ASTSGOT 24 15  --    ALKPHOSPHAT 96 72  --    TBILIRUBIN 0.9 0.5  --    PREALBUMIN  --   --  13.0*   GLUCOSE 310* 113* 104*     Recent Labs     01/11/20  0500 01/12/20  0526 01/13/20  0445   RBC 4.21 4.14* 4.42   HEMOGLOBIN 10.9* 10.8* 11.5*   HEMATOCRIT 36.9* 35.7* 38.3   PLATELETCT 338 250 287     Recent Labs     01/10/20  1830 01/11/20  0500 01/12/20  0526 01/13/20  0445   WBC 15.7* 12.8* 9.6 8.2   NEUTSPOLYS 68.00 76.80* 77.50* 72.00   LYMPHOCYTES 20.30* 10.90* 11.40* 13.80*   MONOCYTES 8.60 10.90 10.20 10.90   EOSINOPHILS 1.40 0.20 0.30 2.20   BASOPHILS 0.70 0.40 0.20 0.50   ASTSGOT 24 15  --   --    ALTSGPT 18 17  --   --    ALKPHOSPHAT 96 72  --   --    TBILIRUBIN 0.9 0.5  --   --        Meds:  • atorvastatin  40 mg     • aspirin  81 mg     • lisinopril  5 mg     • [START ON 1/14/2020] furosemide  20 mg     • metoprolol SR  100 mg     • enoxaparin (LOVENOX) injection  40 mg     • Pharmacy  1 Each     • acetaminophen  650 mg     • ondansetron  4 mg     • ipratropium-albuterol  3 mL     • cefTRIAXone (ROCEPHIN) IV  2 g Stopped (01/12/20 9361)   • Respiratory Care per Protocol       • senna-docusate  2 Tab      And   • polyethylene glycol/lytes  1 Packet      And   • magnesium hydroxide  30 mL      And   • bisacodyl  10 mg     • insulin lispro  1-6 Units      And   • dextrose 10% bolus  250 mL            Imaging:  DX-CHEST-PORTABLE (1  VIEW)   Final Result         1.  Pulmonary edema and/or infiltrates are identified, which are stable since the prior exam.   2.  Cardiomegaly   3.  Atherosclerosis      DX-CHEST-PORTABLE (1 VIEW)   Final Result      No significant interval change.      DX-ABDOMEN FOR TUBE PLACEMENT   Final Result      1.  Feeding tube tip overlies expected location of the gastric body.      EC-ECHOCARDIOGRAM COMPLETE W/ CONT   Final Result      DX-CHEST-FOR LINE PLACEMENT Perform procedure in: Patient's Room   Final Result         1.  Pulmonary edema and/or infiltrates are identified, which are stable since the prior exam.   2.  Cardiomegaly   3.  Atherosclerosis      CT-HEAD W/O   Final Result         1.  No acute intracranial abnormality is identified, there are nonspecific white matter changes, commonly associated with small vessel ischemic disease.  Associated mild cerebral atrophy is noted.   2.  Atherosclerosis.      DX-CHEST-PORTABLE (1 VIEW)   Final Result         1. No focal consolidation or pleural effusions.   2. Emphysema.   3. Well-positioned ETT and NGT.   4. Mild bilateral interstitial prominence, which may be related to mild edema or underlying emphysema.          Problem and Plan:    * Acute respiratory failure with hypoxia (HCC)- (present on admission)  Assessment & Plan  Assessment: Patient presented with relatively rapid onset of acute hypoxic respiratory failure. Does not appear to be sufficient pulmonary edema to explain her respiratory failure and confusion. BNP was elevated however IVC collapsible and not clinically volume overloaded without edema or JVD. Potentially secondary to a community-acquired pneumonia, she was recently hospitalized and this is somewhat at risk for resistant organisms. S/P bronchoscopy, BAL sent for culture and sensitivity. Somewhat less likely could be secondary to COPD exacerbation, however wheezing is not a prominent feature of her presentation although minimally present and this  does not usually present so quickly.  Bedside ultrasound was used to assess for likelihood of pulmonary embolism, right ventricle is roughly normal in size and function and IVC is collapsible.  -Patient was intubated in the field  -Now extubated, near home O2 requirements    Plan:  -Continue ceftriaxone, procalcitonin elevated however clinically improving. Probably 1 more day  -Follow-up BAL culture and sensitivity  -Duo nebs every 4 hours    Septic shock (HCC)  Assessment & Plan  This is Septic shock Present on admission  SIRS criteria identified on my evaluation include: Tachycardia, with heart rate greater than 90 BPM, Tachypnea, with respirations greater than 20 per minute and Leukocyosis, with WBC greater than 12,000  Source is UTI vs CAP  Presentation includes: Severe sepsis present, persistent hypotension after 30 ml/kg completed, and initial lactate level result is >= 4 mmol/L.   Despite appropriate fluid resuscitation with crystalloid given per sepsis guidelines, the patient remains hypotensive with systolic blood pressure less than 90 or MAP less than 65  Hemodynamic support with additional fluids and IV vasopressors as needed to maintain a SBP of 90 or MAP of 65  IV antibiotics as appropriate for source of sepsis  Reassessment: I have reassessed the patient's hemodynamic status    -Patient had (QSOFA 3/3, SIRS 3/4 on presentation) with encephalopathy and lactic acidosis, secondary to likely community acquired pneumonia versus urinary tract infection  -Resolved lactic acidosis, patient received bolus of fluids  -Patient is currently intubated and on mechanical ventilation day 3.  Following commands off sedation  -Blood culture negative so far  -Off pressor support    Plan:  -Extubation  -Continue Ceftriaxone for 1 further day  -Follow-up blood and urine culture - negative so far  -Follow-up BAL culture and sensitivity - negative so far  -Monitor input and output    Heart failure with reduced ejection  fraction (HCC)  Assessment & Plan  -Last echocardiogram in 2017 showed EF of 70%.  Bedside echocardiogram showed low EF.  Elevated BNP on admission.  Chest x-ray concerning for interstitial edema. Now that patient has recovered she describes orthopnea prior to admission, consider that her presentation may have been secondary to heart failure exacerbation. Her MR may predispose her to exacerbation with relatively small changes in her volume.  -TTE showed EF 30%, Akinetic mid-distal septum, anterior wall and apex. Severely reduced left ventricular systolic function.  -Transition to home lasix dosing, will titrate as appropriate - may require higher dose of 40mg daily  -Started Lisinopril 5mg for HFrEF  -Restart home toprol XL for HFrEF  -Potassium replacement  -Consider cardiology workup for worsening of ischemic cardiomyopathy and for MR, could probably be done outpatient    Community acquired pneumonia  Assessment & Plan  Assessment: Patient presented with early RLL infiltrate, leukocytosis, and acute hypoxic respiratory failure consistent with CAP. Recent admission increases the risk of resistant organisms.  Treating for community-acquired pneumonia and not resistant organisms. Procalcitonin negative.  Flu negative, leukocytosis improving, lactic acidosis resolved.  -Elevated procalcitonin level    Plan:  -Continue C3 for 1 day    Encephalopathy acute  Assessment & Plan  -CT head negative for any acute abnormality  -Most likely sepsis related  -No neck stiffness on presentation  -Improved.  Alert and oriented.    COPD (chronic obstructive pulmonary disease) ([facility])  Assessment & Plan  -Does not appear to be on exacerbation.  No wheezing on examination  -RT per protocol  -DuoNeb every 4 hourly/as needed  -No indication for prednisone    DM (diabetes mellitus) ([facility])- (present on admission)  Assessment & Plan  -SSI/Accu-Chek/hypoglycemia protocol    CAD (coronary artery disease)- (present on admission)  Assessment &  Plan  -No aspirin or statin on home med list  -Check lipid panel  -Started ASA/Statin for likely ischemic cardiomyopathy      DISPO: Transfer orders to floor    CODE STATUS: DNAR/I ok    Quality Measures:  Feeding: Pending speech eval/bedside swqallow  Analgesia: Tylenol  Sedation: None  Thromboprophylaxis: Lovenox  Head of bed: >30 degrees  Ulcer prophylaxis: None  Glycemic control: SSI  Bowel care: bowel regimen  Indwelling lines: PIV, central line, lomeli, NG  Deescalation of antibiotics: 1 more day      Sharad Castillo M.D.

## 2020-01-13 NOTE — PROGRESS NOTES
Cardiology Consult Note:                                       Note Author:   Godfrey Cheema D.O.  Date & Time note created:    1/13/2020   3:17 PM     Referring MD:  Dr. Castillo    Patient ID:  Name:             Lacey Her     YOB: 1945  Age:                 74 y.o.  female   MRN:               [id number]                                                             Reason for Consult:      Reduced ejection fraction    History of Present Illness:    Lacey Her is a 74 y.o. female with a past medical history of Type II Diabetes mellitus, hyperparathyroidism, COPD, coronary artery disease status post CABG with angiogram in 2017 showing hypokinetic proximal and mid anterior wall and occluded grafts. Patient was admitted for acute acute hypoxic respiratory failure presumed secondary to sepsis. She was extubated yesterday. Patient had an echocardiogram which showed EF of 30%. Patient denies any recent chest pain or difficulty breathing. She was being diuresed during this admission.       Review of Systems:      Constitutional: Denies fevers, Denies weight changes  Eyes: Denies changes in vision, no eye pain  Ears/Nose/Throat/Mouth: Denies nasal congestion or sore throat   Cardiovascular: Denies chest pain, Denies palpitations   Respiratory: Denies worsening shortness of breath , Denies cough  Gastrointestinal/Hepatic: Denies abdominal pain, nausea, vomiting, diarrhea, constipation or GI bleeding   Genitourinary: Denies dysuria or frequency  Musculoskeletal/Rheum: Denies  joint pain and swelling, Denies edema  Skin: Denies rash  Neurological: Denies headache, confusion, memory loss or focal weakness/parasthesias  Psychiatric: denies mood disorder   Endocrine: Denies thyroid problems  Heme/Oncology/Lymph Nodes: Denies enlarged lymph nodes, denies bruising or known bleeding disorder  All other systems were reviewed and are negative (AMA/CMS criteria)                Past Medical History:   Past Medical  History:   Diagnosis Date   • CAD (coronary artery disease)    • Chronic obstructive pulmonary disease (HCC)    • Chronic pain    • Diabetes (Formerly [facility])    • Hypercholesterolemia    • Hyperparathyroidism ([facility] [place])    • Hypertension    • Myocardial infarct ([facility]ly [facility]) 2004    quad bypass     Active Hospital Problems    Diagnosis   • Septic shock ([facility] [place]) [A41.9, R65.21]     Priority: High   • Acute respiratory failure with hypoxia ([facility]) [J96.01]     Priority: High   • Community acquired pneumonia [J18.9]     Priority: Medium   • Heart failure with reduced ejection fraction ([facility] [place]) [I50.20]     Priority: Medium   • Iron deficiency anemia [D50.9]     Priority: Low   • Encephalopathy acute [G93.40]     Priority: Low   • COPD (chronic obstructive pulmonary disease) ([facility] [place]) [J44.9]     Priority: Low   • CAD (coronary artery disease) [I25.10]     Priority: Low   • DM (diabetes mellitus) ([facility] [place]) [E11.9]     Priority: Low       Past Surgical History:  Past Surgical History:   Procedure Laterality Date   • OTHER CARDIAC SURGERY  2004    cabg x4v       Hospital Medications:  Current Facility-Administered Medications   Medication Dose   • atorvastatin (LIPITOR) tablet 40 mg  40 mg   • aspirin (ASA) chewable tab 81 mg  81 mg   • lisinopril (PRINIVIL) tablet 5 mg  5 mg   • [START ON 1/14/2020] furosemide (LASIX) tablet 20 mg  20 mg   • carvedilol (COREG) tablet 3.125 mg  3.125 mg   • spironolactone (ALDACTONE) tablet 25 mg  25 mg   • cinacalcet (SENSIPAR) tablet 30 mg  30 mg   • enoxaparin (LOVENOX) inj 40 mg  40 mg   • Pharmacy Consult: Enteral tube insertion - review meds/change route/product selection  1 Each   • acetaminophen (TYLENOL) tablet 650 mg  650 mg   • ondansetron (ZOFRAN) syringe/vial injection 4 mg  4 mg   • ipratropium-albuterol (DUONEB) nebulizer solution  3 mL   • cefTRIAXone (ROCEPHIN) 2 g in  mL IVPB  2 g   • Respiratory Care per Protocol     • senna-docusate (PERICOLACE or SENOKOT S) 8.6-50 MG per tablet 2 Tab  2 Tab    And   •  polyethylene glycol/lytes (MIRALAX) PACKET 1 Packet  1 Packet    And   • magnesium hydroxide (MILK OF MAGNESIA) suspension 30 mL  30 mL    And   • bisacodyl (DULCOLAX) suppository 10 mg  10 mg   • insulin lispro (HUMALOG) injection 1-6 Units  1-6 Units    And   • DEXTROSE 10% BOLUS 250 mL  250 mL         Current Outpatient Medications:  Medications Prior to Admission   Medication Sig Dispense Refill Last Dose   • cinacalcet (SENSIPAR) 30 MG Tab Take 30 mg by mouth every day.   UNK at UNK   • potassium chloride SA (KDUR) 20 MEQ Tab CR Take 20 mEq by mouth every day.   UNK at UNK   • furosemide (LASIX) 20 MG Tab Take 20 mg by mouth every day.   UNK at UNK   • Fluticasone-Umeclidin-Vilant (TRELEGY ELLIPTA) 100-62.5-25 MCG/INH AEROSOL POWDER, BREATH ACTIVATED Inhale 1 Puff by mouth 2 Times a Day.   UNK at UNK   • metoprolol SR (TOPROL XL) 100 MG TABLET SR 24 HR Take 100 mg by mouth every day.   UNK at UNK   • metformin (GLUCOPHAGE) 500 MG Tab Take 500 mg by mouth 2 times a day, with meals.   UNK at UNK   • albuterol (VENTOLIN HFA) 108 (90 BASE) MCG/ACT Aero Soln inhalation aerosol Inhale 1 Puff by mouth every 6 hours as needed for Shortness of Breath.   UNK at UNK       Medication Allergy:  No Known Allergies    Family History:  Family History   Problem Relation Age of Onset   • Lung Disease Mother    • Heart Disease Father        Social History:  Social History     Socioeconomic History   • Marital status:      Spouse name: Not on file   • Number of children: Not on file   • Years of education: Not on file   • Highest education level: Not on file   Occupational History   • Not on file   Social Needs   • Financial resource strain: Not on file   • Food insecurity:     Worry: Not on file     Inability: Not on file   • Transportation needs:     Medical: Not on file     Non-medical: Not on file   Tobacco Use   • Smoking status: Former Smoker     Packs/day: 1.00     Years: 50.00     Pack years: 50.00     Types:  "Cigarettes     Last attempt to quit: 11/25/2016     Years since quitting: 3.1   Substance and Sexual Activity   • Alcohol use: No   • Drug use: No   • Sexual activity: Not on file   Lifestyle   • Physical activity:     Days per week: Not on file     Minutes per session: Not on file   • Stress: Not on file   Relationships   • Social connections:     Talks on phone: Not on file     Gets together: Not on file     Attends Hinduism service: Not on file     Active member of club or organization: Not on file     Attends meetings of clubs or organizations: Not on file     Relationship status: Not on file   • Intimate partner violence:     Fear of current or ex partner: Not on file     Emotionally abused: Not on file     Physically abused: Not on file     Forced sexual activity: Not on file   Other Topics Concern   • Not on file   Social History Narrative   • Not on file         Physical Exam:  Vitals/ General Appearance:   Weight/BMI: Body mass index is 27.15 kg/m².  /60   Pulse 84   Temp 36.6 °C (97.9 °F) (Temporal)   Resp 17   Ht 1.651 m (5' 5\")   Wt 74 kg (163 lb 2.3 oz)   SpO2 97%   Vitals:    01/13/20 1200 01/13/20 1300 01/13/20 1354 01/13/20 1358   BP:  139/60     Pulse: (!) 103 89 86 84   Resp: 20 14 17 17   Temp:  36.6 °C (97.9 °F)     TempSrc:  Temporal     SpO2:  97%     Weight:       Height:         Oxygen Therapy:  Pulse Oximetry: 97 %, O2 (LPM): 3, O2 Delivery: Silicone Nasal Cannula    Constitutional:  Appears older than stated age. Sitting on chair in no acute distress.   HENMT:  Normocephalic, Atraumatic, Oropharynx moist mucous membranes, No oral exudates, Nose normal.    Eyes:  Conjunctiva normal, No discharge.  Neck:  Normal range of motion, No cervical tenderness,  no JVD.  Cardiovascular: Midline chest incision from prior CABG. Normal heart rate, Normal rhythm, No murmurs, No rubs, No gallops.   Extremitites with intact distal pulses, no cyanosis, or edema.  Lungs:  Diminished breath " sounds,  no crackles, no wheezing.   Abdomen: Bowel sounds normal, Soft, No tenderness, No guarding, No rebound, No masses, No hepatosplenomegaly.  Skin: Warm, Dry, No erythema, No rash, no induration.  Neurologic: Alert & oriented x 3, No focal deficits noted, cranial nerves II through XII are grossly intact.  Psychiatric: Affect normal, Judgment normal, Mood normal.      Lab Data Review:  Recent Results (from the past 24 hour(s))   ACCU-CHEK GLUCOSE    Collection Time: 01/12/20  5:12 PM   Result Value Ref Range    Glucose - Accu-Ck 114 (H) 65 - 99 mg/dL   CBC with Differential    Collection Time: 01/13/20  4:45 AM   Result Value Ref Range    WBC 8.2 4.8 - 10.8 K/uL    RBC 4.42 4.20 - 5.40 M/uL    Hemoglobin 11.5 (L) 12.0 - 16.0 g/dL    Hematocrit 38.3 37.0 - 47.0 %    MCV 86.7 81.4 - 97.8 fL    MCH 26.0 (L) 27.0 - 33.0 pg    MCHC 30.0 (L) 33.6 - 35.0 g/dL    RDW 50.7 (H) 35.9 - 50.0 fL    Platelet Count 287 164 - 446 K/uL    MPV 10.1 9.0 - 12.9 fL    Neutrophils-Polys 72.00 44.00 - 72.00 %    Lymphocytes 13.80 (L) 22.00 - 41.00 %    Monocytes 10.90 0.00 - 13.40 %    Eosinophils 2.20 0.00 - 6.90 %    Basophils 0.50 0.00 - 1.80 %    Immature Granulocytes 0.60 0.00 - 0.90 %    Nucleated RBC 0.00 /100 WBC    Neutrophils (Absolute) 5.87 2.00 - 7.15 K/uL    Lymphs (Absolute) 1.13 1.00 - 4.80 K/uL    Monos (Absolute) 0.89 (H) 0.00 - 0.85 K/uL    Eos (Absolute) 0.18 0.00 - 0.51 K/uL    Baso (Absolute) 0.04 0.00 - 0.12 K/uL    Immature Granulocytes (abs) 0.05 0.00 - 0.11 K/uL    NRBC (Absolute) 0.00 K/uL   Magnesium    Collection Time: 01/13/20  4:45 AM   Result Value Ref Range    Magnesium 2.2 1.5 - 2.5 mg/dL   Phosphorus    Collection Time: 01/13/20  4:45 AM   Result Value Ref Range    Phosphorus 2.5 2.5 - 4.5 mg/dL   Comp Metabolic Panel    Collection Time: 01/13/20  4:45 AM   Result Value Ref Range    Sodium 147 (H) 135 - 145 mmol/L    Potassium 3.6 3.6 - 5.5 mmol/L    Chloride 106 96 - 112 mmol/L    Co2 30 20 - 33  mmol/L    Anion Gap 11.0 0.0 - 11.9    Glucose 113 (H) 65 - 99 mg/dL    Bun 28 (H) 8 - 22 mg/dL    Creatinine 0.86 0.50 - 1.40 mg/dL    Calcium 10.4 8.5 - 10.5 mg/dL    AST(SGOT) 10 (L) 12 - 45 U/L    ALT(SGPT) 12 2 - 50 U/L    Alkaline Phosphatase 76 30 - 99 U/L    Total Bilirubin 0.5 0.1 - 1.5 mg/dL    Albumin 3.3 3.2 - 4.9 g/dL    Total Protein 6.1 6.0 - 8.2 g/dL    Globulin 2.8 1.9 - 3.5 g/dL    A-G Ratio 1.2 g/dL   ESTIMATED GFR    Collection Time: 01/13/20  4:45 AM   Result Value Ref Range    GFR If African American >60 >60 mL/min/1.73 m 2    GFR If Non African American >60 >60 mL/min/1.73 m 2   CRP Quantitive (Non-Cardiac)    Collection Time: 01/13/20  8:40 AM   Result Value Ref Range    Stat C-Reactive Protein 8.70 (H) 0.00 - 0.75 mg/dL   Prealbumin    Collection Time: 01/13/20  8:40 AM   Result Value Ref Range    Pre-Albumin 12.0 (L) 18.0 - 38.0 mg/dL   FERRITIN    Collection Time: 01/13/20  8:40 AM   Result Value Ref Range    Ferritin 70.1 10.0 - 291.0 ng/mL   IRON/TOTAL IRON BIND    Collection Time: 01/13/20  8:40 AM   Result Value Ref Range    Iron 28 (L) 40 - 170 ug/dL    Total Iron Binding 322 250 - 450 ug/dL    % Saturation 9 (L) 15 - 55 %       Imaging/Procedures Review:    DX-CHEST-PORTABLE (1 VIEW)   Final Result         1.  Pulmonary edema and/or infiltrates are identified, which are stable since the prior exam.   2.  Cardiomegaly   3.  Atherosclerosis      DX-CHEST-PORTABLE (1 VIEW)   Final Result      No significant interval change.      DX-ABDOMEN FOR TUBE PLACEMENT   Final Result      1.  Feeding tube tip overlies expected location of the gastric body.      EC-ECHOCARDIOGRAM COMPLETE W/ CONT   Final Result      DX-CHEST-FOR LINE PLACEMENT Perform procedure in: Patient's Room   Final Result         1.  Pulmonary edema and/or infiltrates are identified, which are stable since the prior exam.   2.  Cardiomegaly   3.  Atherosclerosis      CT-HEAD W/O   Final Result         1.  No acute  intracranial abnormality is identified, there are nonspecific white matter changes, commonly associated with small vessel ischemic disease.  Associated mild cerebral atrophy is noted.   2.  Atherosclerosis.      DX-CHEST-PORTABLE (1 VIEW)   Final Result         1. No focal consolidation or pleural effusions.   2. Emphysema.   3. Well-positioned ETT and NGT.   4. Mild bilateral interstitial prominence, which may be related to mild edema or underlying emphysema.        Reviewed    EKG:   Sinus rhythm, left atrial enlargement, inverted T waves in V2-V5         ECHO:  CONCLUSIONS  Akinetic mid-distal septum, anterior wall and apex.  Severely reduced left ventricular systolic function.  Left ventricular ejection fraction is visually estimated to be 25-30%.  Left ventricle is moderately dilated.  Moderate  mitral regurgitation.  Moderately dilated left atrium.  Structurally normal aortic valve without significant stenosis or   regurgitation.  Mild tricuspid regurgitation.  Normal inferior vena cava size and inspiratory collapse.  Estimated right ventricular systolic pressure  is 40 mmHg.  Normal right ventricular size and systolic function.  No pericardial effusion seen.      MDM (Assessment and Plan):     Active Hospital Problems    Diagnosis   • Septic shock ([facility]) [A41.9, R65.21]     Priority: High   • Acute respiratory failure with hypoxia ([facility]) [J96.01]     Priority: High   • Community acquired pneumonia [J18.9]     Priority: Medium   • Heart failure with reduced ejection fraction ([facility]) [I50.20]     Priority: Medium   • Iron deficiency anemia [D50.9]     Priority: Low   • Encephalopathy acute [G93.40]     Priority: Low   • COPD (chronic obstructive pulmonary disease) ([facility]) [J44.9]     Priority: Low   • CAD (coronary artery disease) [I25.10]     Priority: Low   • DM (diabetes mellitus) ([facility]) [E11.9]     Priority: Low       Assessment and plan    1. Heart failure with reduced ejection fraction  2. Ischemic  cardiomyopathy  3. Coronary artery disease status post CABG   4. COPD   5. Right lower lobe pneumonia   Patient presents with ischemic cardiomyopathy with recent worsening of ejection fraction from 70% to 30%. Echocardiogram was reviewed in detail and the area of decreased wall motion is in the area of previous infarction.   - Recommend medication management   - Switched metoprolol to carvedilol   - Continue Lisinopril   - Started low dose Aldactone   - Continue aspirin and statin   - No plan for further intervention   - Patient appears euvolemic on exam, continue po Lasix 20mg     Thank you for this consult. We will continue to follow.

## 2020-01-13 NOTE — PROGRESS NOTES
Critical Care Progress Note    Date of admission  1/10/2020    Chief Complaint  74 y.o. female who presented 1/10/2020 with respiratory failure and encephalopathy.  I attempted to call the patient's sister for history however the person who answered at that phone number did not know the patient.  Apparently she has a  but we do not have contact information for him.       Per ERP she had an hour onset of dyspnea so EMS was called.  In the ER she was in respiratory distress and encephalopathic so was intubated.  No additional history available.      POCUS shows normal biventricular function, small collapsing IVC, no significant valvular regurgitation, a couple scattered B-lines but not significant pulmonary edema    Hospital Course          Interval Problem Update  Reviewed last 24 hour events:    Extubated yesterday  A&O x4  Feels better  Orthopnea  Hypertensive  No ectopy - just PVCs  I/Os neg 1505cc/24 hrs  No fever  ECHO note focal wall motion problems, mod MR, RVSP 40 - EF was 70%  (2/6/2017)  CXRs reviewed - CHFbetter    D/w Cards Team resident at bedside    One more day ABX  Lasix to PO  Add Bp meds? ACE  ASA/Statin   Swallow eval  Cards eval - CAD/EF  Transfer?    Case reviewed at length the bedside with Dr. Mack Bustillo, cardiology and the cardiology team  Reviewed echocardiogram and chest x-ray with team  Formal consult pending, initial conversation suggest conservative approach for now     YESTERDAY  Alert  On ventilator  Prop and fentanyl  sbt adequate  Xray improved  3 peripherals  moblize to edge of bed  Vent day 3  18/450/8/35  1.5 hour sbt  Parameters adequate  Extubate  Day 3/5 antibiotics  Cef/azithro  Scheduled lasix    Addendum  Tolerated extubation  Multiple reassessments  Reduced dosing for 40 mg daily starting tomorrow    Review of Systems  Review of Systems   Constitutional: Positive for malaise/fatigue (better). Negative for diaphoresis and fever.   HENT: Negative for congestion and sore  throat.    Eyes: Negative for blurred vision.   Respiratory: Positive for cough and shortness of breath. Negative for hemoptysis, sputum production and wheezing.    Cardiovascular: Positive for orthopnea, leg swelling and PND. Negative for chest pain and palpitations.        CHF S/s better   Gastrointestinal: Negative for abdominal pain, diarrhea, nausea and vomiting.   Genitourinary: Negative.    Musculoskeletal: Negative for back pain.   Neurological: Negative for sensory change, speech change, focal weakness and headaches.   Endo/Heme/Allergies: Does not bruise/bleed easily.   Psychiatric/Behavioral: The patient is not nervous/anxious.         Vital Signs for last 24 hours   Temp:  [36.6 °C (97.9 °F)-36.7 °C (98 °F)] 36.7 °C (98 °F)  Pulse:  [] 70  Resp:  [9-20] 18  BP: (139-178)/(60-92) 148/74  SpO2:  [90 %-99 %] 93 %    Hemodynamic parameters for last 24 hours       Respiratory Information for the last 24 hours       Physical Exam   Physical Exam  Vitals signs and nursing note reviewed.   Constitutional:       Appearance: She is ill-appearing. She is not toxic-appearing or diaphoretic.   HENT:      Head: Normocephalic and atraumatic.      Right Ear: External ear normal.      Left Ear: External ear normal.      Nose: No congestion or rhinorrhea.      Mouth/Throat:      Mouth: Mucous membranes are dry.      Pharynx: No oropharyngeal exudate or posterior oropharyngeal erythema.   Eyes:      General: No scleral icterus.     Extraocular Movements: Extraocular movements intact.      Conjunctiva/sclera: Conjunctivae normal.      Pupils: Pupils are equal, round, and reactive to light.   Neck:      Musculoskeletal: Neck supple. No neck rigidity or muscular tenderness.   Cardiovascular:      Rate and Rhythm: Regular rhythm. Tachycardia present.      Pulses: Normal pulses.      Heart sounds: Normal heart sounds. No murmur.   Pulmonary:      Effort: No respiratory distress.      Breath sounds: No wheezing, rhonchi or  rales.   Abdominal:      General: There is no distension.      Palpations: There is no mass.      Tenderness: There is no tenderness. There is no guarding.   Musculoskeletal:         General: No swelling or tenderness.      Right lower leg: No edema.      Left lower leg: No edema.   Lymphadenopathy:      Cervical: No cervical adenopathy.   Skin:     Coloration: Skin is not jaundiced or pale.      Findings: No bruising, erythema, lesion or rash.   Neurological:      General: No focal deficit present.      Mental Status: She is alert.      Cranial Nerves: No cranial nerve deficit.      Sensory: No sensory deficit.      Motor: No weakness.      Coordination: Coordination normal.      Deep Tendon Reflexes: Reflexes normal.      Comments: sedated         Medications  Current Facility-Administered Medications   Medication Dose Route Frequency Provider Last Rate Last Dose   • atorvastatin (LIPITOR) tablet 40 mg  40 mg Oral Q EVENING Sharad Castillo M.D.   40 mg at 01/13/20 1753   • lisinopril (PRINIVIL) tablet 5 mg  5 mg Oral Q DAY Sharad Castillo M.D.   5 mg at 01/13/20 1018   • [START ON 1/14/2020] furosemide (LASIX) tablet 20 mg  20 mg Oral Q DAY Sharad Castillo M.D.       • carvedilol (COREG) tablet 3.125 mg  3.125 mg Oral BID WITH MEALS Godfrey Cheema D.O.   3.125 mg at 01/13/20 1754   • spironolactone (ALDACTONE) tablet 25 mg  25 mg Oral Q DAY Godfrey Cheema D.O.   25 mg at 01/13/20 1226   • cinacalcet (SENSIPAR) tablet 30 mg  30 mg Oral DAILY Tariq Thakur M.D.   30 mg at 01/13/20 1226   • [START ON 1/14/2020] aspirin (ASA) chewable tab 81 mg  81 mg Oral DAILY Tariq Thakur M.D.       • insulin lispro (HUMALOG) injection 1-6 Units  1-6 Units Subcutaneous 4X/DAY CHEMO Thakur M.D.   Stopped at 01/13/20 1700    And   • DEXTROSE 10% BOLUS 250 mL  250 mL Intravenous Q15 MIN PRN Tariq Thakur M.D.       • senna-docusate (PERICOLACE or SENOKOT S) 8.6-50 MG per tablet 2 Tab  2 Tab Oral BID Tariq VALERO  JEB Thakur   2 Tab at 01/13/20 1754    And   • polyethylene glycol/lytes (MIRALAX) PACKET 1 Packet  1 Packet Oral QDAY PRN Tariq Thakur M.D.        And   • magnesium hydroxide (MILK OF MAGNESIA) suspension 30 mL  30 mL Oral QDAY PRN Tariq Thakur M.D.        And   • bisacodyl (DULCOLAX) suppository 10 mg  10 mg Rectal QDAY PRN Tariq Thakur M.D.       • acetaminophen (TYLENOL) tablet 650 mg  650 mg Oral Q4HRS PRN Tariq Thakur M.D.       • enoxaparin (LOVENOX) inj 40 mg  40 mg Subcutaneous DAILY Nancy Pickard M.D.   40 mg at 01/13/20 0537   • ondansetron (ZOFRAN) syringe/vial injection 4 mg  4 mg Intravenous Q4HRS PRN Rusty Solis M.D.   4 mg at 01/11/20 1715   • ipratropium-albuterol (DUONEB) nebulizer solution  3 mL Nebulization Q4HRS (RT) Sharad Castillo M.D.   3 mL at 01/13/20 1953   • cefTRIAXone (ROCEPHIN) 2 g in  mL IVPB  2 g Intravenous Q24HRS Sharad Castillo M.D.   Stopped at 01/12/20 2244   • Respiratory Care per Protocol   Nebulization Continuous RT Sharad Castillo M.D.           Fluids    Intake/Output Summary (Last 24 hours) at 1/13/2020 2020  Last data filed at 1/13/2020 1800  Gross per 24 hour   Intake 1520 ml   Output 2800 ml   Net -1280 ml       Laboratory  Recent Labs     01/11/20  0408 01/12/20  0516   ISTATAPH 7.320* 7.431   ISTATAPCO2 46.1* 38.5*   ISTATAPO2 75 74   ISTATATCO2 25 27   YHDOWPV7BIW 94 95   ISTATARTHCO3 23.7 25.6*   ISTATARTBE -3 1   ISTATTEMP 37.2 C 37.6 C   ISTATFIO2 60 35   ISTATSPEC Arterial Arterial   ISTATAPHTC 7.317* 7.422   TIOABVCW2DV 76 77         Recent Labs     01/11/20  0500 01/12/20  0526 01/13/20  0445   SODIUM 139 144 147*   POTASSIUM 3.7 3.3* 3.6   CHLORIDE 109 108 106   CO2 21 26 30   BUN 19 21 28*   CREATININE 0.83 1.04 0.86   MAGNESIUM 1.8 2.0 2.2   PHOSPHORUS 4.1 3.3 2.5   CALCIUM 8.8 9.6 10.4     Recent Labs     01/11/20  0500 01/12/20  0526 01/13/20  0445 01/13/20  0840   ALTSGPT 17  --  12  --    ASTSGOT 15  --  10*  --     ALKPHOSPHAT 72  --  76  --    TBILIRUBIN 0.5  --  0.5  --    PREALBUMIN  --  13.0*  --  12.0*   GLUCOSE 113* 104* 113*  --      Recent Labs     01/11/20  0500 01/12/20  0526 01/13/20  0445   WBC 12.8* 9.6 8.2   NEUTSPOLYS 76.80* 77.50* 72.00   LYMPHOCYTES 10.90* 11.40* 13.80*   MONOCYTES 10.90 10.20 10.90   EOSINOPHILS 0.20 0.30 2.20   BASOPHILS 0.40 0.20 0.50   ASTSGOT 15  --  10*   ALTSGPT 17  --  12   ALKPHOSPHAT 72  --  76   TBILIRUBIN 0.5  --  0.5     Recent Labs     01/11/20  0500 01/12/20  0526 01/13/20  0445 01/13/20  0840   RBC 4.21 4.14* 4.42  --    HEMOGLOBIN 10.9* 10.8* 11.5*  --    HEMATOCRIT 36.9* 35.7* 38.3  --    PLATELETCT 338 250 287  --    IRON  --   --   --  28*   FERRITIN  --   --   --  70.1   TOTIRONBC  --   --   --  322       Imaging  X-Ray:  I have personally reviewed the images and compared with prior images.  EKG:  I have personally reviewed the images and compared with prior images.  CT:    Reviewed  Echo:   Reviewed    Assessment/Plan  * Acute respiratory failure with hypoxia (HCC)- (present on admission)  Assessment & Plan  Septic shock from pna  Suspect significant cardiac component with poor ef - 30%  Initial events came on fairly suddenly, chest x-ray looks more like pulmonary edema  Also suspect a component of COPD contributing  Intubated 1/11  Extubated 1/12  Rt/o2 protocols  Incentive spirometry  Mobilize  Resuscitated, now diuresing, improved with Lasix, negative fluid balance    Septic shock (HCC)  Assessment & Plan  This is Septic shock Present on admission  SIRS criteria identified on my evaluation include: Tachycardia, with heart rate greater than 90 BPM, Tachypnea, with respirations greater than 20 per minute and Leukocyosis, with WBC greater than 12,000  Source is urinary vs pulmonary  Presentation includes: Severe sepsis present, persistent hypotension after 30 ml/kg completed, and initial lactate level result is >= 4 mmol/L.   Despite appropriate fluid resuscitation with  crystalloid given per sepsis guidelines, the patient remains hypotensive with systolic blood pressure less than 90 or MAP less than 65  Hemodynamic support with additional fluids and IV vasopressors as needed to maintain a SBP of 90 or MAP of 65  IV antibiotics as appropriate for source of sepsis  Reassessment: I have reassessed the patient's hemodynamic status    Urinary +/- PNA with RLL infiltrate, follow-up x-ray looks like improving pulmonary edema  CTX, azithro-plan 5-day course  LV initially looked normal function with small and collapsing IVC, then function appeared ~30% after fluid resuscitation so no additional fluid given, formal echo reveals EF 30% with focal wall motion abnormalities  S/p bronch, UC negative so far  Continue postresuscitation diuresis      Dyspnea  Assessment & Plan  History obtained by ERP was 1 hour of acute onet dyspnea.  This is concerning for PE or MI.  Low suspicion for PE given alternate etiologies and normal appearing RV on echo. MI certainly possible.  No STEMI, but + lateral and high lateral STD. Trend trops.  Probably related to her sepsis +/- PNA.  S. pneumo can come on quickly.  Echocardiogram shows EF down from 70% 2 years ago to 30% now.  Cardiology consultation pending, forced diuresis continues    Heart failure with reduced ejection fraction (HCC)  Assessment & Plan  Normal EF 2017, 70%  Initially normal EF on bedside TTE, then after fluids EF appeared about 30%  Formal TTE reveals EF 30% with moderate MR  No additional fluid now, forced diuresis  No BB or ACE given shock, initiate therapy when clinically appropriate  Cardiology consultation      Community acquired pneumonia  Assessment & Plan  Apparently hospitalized recently for CHF, but we don't have additional history about this  DRIP score 2 based on history we have  Treating CAP not resistant organisms  Flu neg  Echo with EF 30%, down from 70% 2 years ago  X-ray better with diuresis  Suspect a component of COPD and  pulmonary edema perhaps more likely than CAP, treating for all of the above      COPD (chronic obstructive pulmonary disease) ([facility])  Assessment & Plan  Not wheezing on exam but significant COPD clearly contributing here I suspect  duoneb every 4 hours while awake and every 2 hours as needed  No indication for prednisone, monitoring  Update vaccines prior to discharge  RT protocols  Mobilize  Patient is on home O2, 3-4 L.  She already has a home nebulizer    CAD (coronary artery disease)- (present on admission)  Assessment & Plan  No ASA, statin on home meds list  Cardiology evaluation, EF less than half year was 2 years ago  Await cards recommendations    Encephalopathy acute  Assessment & Plan  Possibly sepsis related, improved  CTH reassuring  No neck stiffness concerning for meningitis  Following commands on propofol 1/12, back to neurologic baseline off the ventilator 1/13    DM (diabetes mellitus) ([facility])- (present on admission)  Assessment & Plan  SSI  Blood sugars testing will be changed from every 6 to AC/at bedtime  Hypoglycemia protocols       VTE:  Heparin  Ulcer: H2 Antagonist  Lines: Central Line  Ongoing indication addressed and Stevens Catheter  Ongoing indication addressed    I have performed a physical exam and reviewed and updated ROS and Plan today (1/13/2020). In review of yesterday's note (1/12/2020), there are no changes except as documented above.     Discussed patient condition and risk of morbidity and/or mortality with RN, RT, Pharmacy, , Charge nurse / hot rounds, Patient and cardiology     Ok to tele  Cards eval

## 2020-01-13 NOTE — CARE PLAN
Problem: Nutritional:  Goal: Achieve adequate nutritional intake  Description  Patient will consume 50% or more of meals.   Outcome: PROGRESSING AS EXPECTED

## 2020-01-13 NOTE — ASSESSMENT & PLAN NOTE
Mild borderline microcytic anemia with low serum iron, low t-sat, low-normal ferritin indicative of likely iron deficiency anemia.    Order occult blood  Start ferrous sulfate 324mg every other day  Ensure up to date on colonoscopy outpatient

## 2020-01-13 NOTE — PROGRESS NOTES
Monitor summary: SR 60-90s, up to 150 with agitation prior to extubation, frequent PVCs, bigeminal, occasional PACs, .16/.10/.40

## 2020-01-13 NOTE — PROGRESS NOTES
Report received from Sarah and Isabel ARIAS. Patient sleeping in bed, connected to monitors, saline locked. Call light within reach, bed alarm on.

## 2020-01-13 NOTE — THERAPY
Speech Language Therapy Clinical Swallow Evaluation completed.  Functional Status: Pt seen on this date for a clinical swallow evaluation. Pt A&Ox4 and agreeable to therapy. Pt's baseline oxygen is 4L and pt currently on 3L. Pt is [place] and has bilateral hearing aids. She reported that her voice is slightly hoarse which is not baseline, however, voice is strong. No gross deficits or asymmetry appreciated in the oral motor evaluation. No overt s/sx of aspiration appreciated with single ice chips, NTL via teaspoon and cup sip, purees, soft solids, and mixed consistencies. Cough x1 appreciated with thin liquids, however, pt consumed ~10oz with no other overt s/sx of aspiration. Delayed cough x1 appreciated with bites of solids about 1 minute after swallow. Vocal quality was clear following all trials. Slight increased in WOB appreciated with solids, however, pt did not c/o SOB and slightly prolonged mastication appreciated with solids. Pt required min-mod cues to take small single bite/sips. At this time, recommend that pt begin a dysphagia III/thin liquid diet with monitoring during meals and implementation of posted swallowing strategies (small bites/sips, up at 90* during meals, reduced eating rate, no talking with PO in mouth). Dysphagia education and recommendations provided to pt and she verbalized understanding. Would recommend keeping cortrak in for at least 2 meals to ensure tolerance of diet before d/c. SLP to follow.    Recommendations - Diet:  dysphagia III/thin liquid diet with monitoring during meals and implementation of posted swallowing strategies (small bites/sips, up at 90* during meals, reduced eating rate, no talking with PO in mouth), Would recommend keeping cortrak in for at least 2 meals to ensure tolerance of diet before d/c                          Strategies: Monitor during meals, No Straws and Head of Bed at 90 Degrees                          Medication Administration:  Whole with liquid wash  "or per pt preference   Plan of Care: Will benefit from Speech Therapy 3 times per week  Post-Acute Therapy: Recommend inpatient transitional care services for continued speech therapy services vs Recommend outpatient or home health transitional care services for continued speech therapy services         See \"Rehab Therapy-Acute\" Patient Summary Report for complete documentation.   "

## 2020-01-13 NOTE — CARE PLAN
Respiratory Therapy Update    Interdisciplinary Plan of Care-Goals (Indications)  Bronchodilator Indications: Strong Subjective / Objective Improvement (01/12/20 1505)  Interdisciplinary Plan of Care-Outcomes   Bronchodilator Outcome: Patient at Stable Baseline (01/12/20 1505)          #SVN Performed: Yes (01/13/20 0354)    Cough: Productive (01/12/20 2000)  Sputum Amount: Small (01/12/20 2000)  Sputum Color: White (01/12/20 2000)  Sputum Consistency: Thick (01/12/20 2000)               FiO2%: 35 % (01/12/20 1000)  O2 (LPM): 3 (01/13/20 0354)  O2 Daily Delivery Respiratory : Nasal Cannula (01/12/20 8815)    Breath Sounds  Pre/Post Intervention: Pre Intervention Assessment (01/13/20 0354)  RUL Breath Sounds: Clear (01/13/20 0354)  RML Breath Sounds: Clear (01/13/20 0354)  RLL Breath Sounds: Diminished (01/13/20 0354)  YUMIKO Breath Sounds: Clear (01/13/20 0354)  LLL Breath Sounds: Diminished (01/13/20 0354)  Therapy changed to   Events/Summary/Plan: PT is resting well at this time. (01/12/20 1505)

## 2020-01-13 NOTE — CARE PLAN
Problem: Risk for Impaired Mobility--Activity Intolerance  Goal: Activity Level appropriate for Discharge or Transfer  Note:   Mobilizing patient to chair with stand by/one person assist, able to ambulate short distances     Problem: Fluid Volume:  Goal: Will maintain balanced intake and output  Note:   Receiving lasix

## 2020-01-13 NOTE — PROGRESS NOTES
Patient presented via EMS on 1/10/20. She stated that she had a history of HF, that she had recently been discharged from [facility]. She didn't have details of this admission, per EMS she was there for HF exacerbation.    Patient has no encounters on file with our cardiology group with the exception of an admission in 2017 when she was found to have an EF of 70% and angio revealed hypokinesis of the prox and mid anterior wall, 3 vessel native disease and occluded bypass grafts: LIMA to LAD and SVG to unknown.    Our echocardiogram on this admission shows an EF of 30%, so patient is now HFrEF. She also has severe COPD per H&P. No AD on file, DNAR, I OK per chart.    Per admitting H&P, patient receives most of her care at [facility].     Please see below for measures that must be addressed prior to discharge.     Daily Nurse: please begin to fill out the HF checklist (pink sheet in hard chart) and use it to guide your daily care.    Discharge Nurse: please ensure completeness of the HF checklist (pink sheet in hard chart) and have it co-signed by the charge RN before the patient leaves the hospital.    Thank you, Betty, Cardiovascular Nurse Navigator, RN, CHFN [phone]    HF Measures:  1. Documentation of LV systolic function (echo or cath) PTA, during this hospitalization, or plan to assess post discharge or reason for not assessing documented.  2. ACE-I, ARNI or ARB prescribed on discharge for LVEF <40%  3. For HF patients with LVEF less than or equal to 40% evidence based beta blocker must be prescribed upon discharge one of the following: carvedilol, bisoprolol, Toprol XL  4. For EF less than or equal to 35% aldosterone blockade prescribed upon discharge  5. Nutrition consult for diet education  6. HF education documented daily  7. Screening for and administering immunizations as long as no contraindications: Pneumonia and Influenza  8. Written discharge instructions include:  ? Daily weights  ? Record weight on  tracker  ? Bring tracker to appointments  ? Call MD for weight gain of 3lb /day or 5lb/week  ? HF medication teaching  ? Low sodium diet  ? Follow up appointment within seven calendar days of d/c must include: date, time and location  ? Activity  ? Worsening symptoms  What if any of the above HF measures are contraindicated?  ? Request that the discharging provider document the medication/intervention and the contraindication specifically in a progress note  ? For example: “no CHF meds due to hypotension” is not enough. It needs to say: “No ACE-I, ARNI, ARB due to hypotension”; “No Beta Blockade due to bradycardia”…

## 2020-01-13 NOTE — CARE PLAN
Problem: Bowel/Gastric:  Goal: Will not experience complications related to bowel motility  Note:   Patient receiving scheduled stool softeners     Problem: Fluid Volume:  Goal: Will maintain balanced intake and output  Note:   Receiving IV lasix

## 2020-01-13 NOTE — DIETARY
Brief Nutrition Services Note: Pt extubated on 1/12 and now started on diet per SLP today. Diet= Diabetic, Dysphagia 3, thin liquids. RN states pt doing well with PO intake and TF likely to be d/c'd after dinner tonight.     Plan/Rec: If PO 50% or greater of next 2-3 meals, discontinue feeding tube. RD to monitor for consistently adequate intake.

## 2020-01-14 ENCOUNTER — PATIENT OUTREACH (OUTPATIENT)
Dept: HEALTH INFORMATION MANAGEMENT | Facility: OTHER | Age: 75
End: 2020-01-14

## 2020-01-14 ENCOUNTER — APPOINTMENT (OUTPATIENT)
Dept: RADIOLOGY | Facility: MEDICAL CENTER | Age: 75
DRG: 871 | End: 2020-01-14
Attending: STUDENT IN AN ORGANIZED HEALTH CARE EDUCATION/TRAINING PROGRAM
Payer: MEDICARE

## 2020-01-14 LAB
ANION GAP SERPL CALC-SCNC: 11 MMOL/L (ref 0–11.9)
BACTERIA UR CULT: ABNORMAL
BACTERIA UR CULT: ABNORMAL
BASOPHILS # BLD AUTO: 0.6 % (ref 0–1.8)
BASOPHILS # BLD: 0.04 K/UL (ref 0–0.12)
BUN SERPL-MCNC: 31 MG/DL (ref 8–22)
CALCIUM SERPL-MCNC: 10.1 MG/DL (ref 8.5–10.5)
CHLORIDE SERPL-SCNC: 101 MMOL/L (ref 96–112)
CO2 SERPL-SCNC: 29 MMOL/L (ref 20–33)
CREAT SERPL-MCNC: 0.88 MG/DL (ref 0.5–1.4)
EOSINOPHIL # BLD AUTO: 0.38 K/UL (ref 0–0.51)
EOSINOPHIL NFR BLD: 5.3 % (ref 0–6.9)
ERYTHROCYTE [DISTWIDTH] IN BLOOD BY AUTOMATED COUNT: 49.1 FL (ref 35.9–50)
GLUCOSE BLD-MCNC: 104 MG/DL (ref 65–99)
GLUCOSE BLD-MCNC: 127 MG/DL (ref 65–99)
GLUCOSE BLD-MCNC: 153 MG/DL (ref 65–99)
GLUCOSE BLD-MCNC: 156 MG/DL (ref 65–99)
GLUCOSE BLD-MCNC: 92 MG/DL (ref 65–99)
GLUCOSE BLD-MCNC: 93 MG/DL (ref 65–99)
GLUCOSE BLD-MCNC: 95 MG/DL (ref 65–99)
GLUCOSE BLD-MCNC: 96 MG/DL (ref 65–99)
GLUCOSE SERPL-MCNC: 94 MG/DL (ref 65–99)
HCT VFR BLD AUTO: 37.9 % (ref 37–47)
HGB BLD-MCNC: 11.7 G/DL (ref 12–16)
IMM GRANULOCYTES # BLD AUTO: 0.06 K/UL (ref 0–0.11)
IMM GRANULOCYTES NFR BLD AUTO: 0.8 % (ref 0–0.9)
LYMPHOCYTES # BLD AUTO: 1.55 K/UL (ref 1–4.8)
LYMPHOCYTES NFR BLD: 21.6 % (ref 22–41)
MCH RBC QN AUTO: 26.1 PG (ref 27–33)
MCHC RBC AUTO-ENTMCNC: 30.9 G/DL (ref 33.6–35)
MCV RBC AUTO: 84.6 FL (ref 81.4–97.8)
MONOCYTES # BLD AUTO: 0.61 K/UL (ref 0–0.85)
MONOCYTES NFR BLD AUTO: 8.5 % (ref 0–13.4)
NEUTROPHILS # BLD AUTO: 4.55 K/UL (ref 2–7.15)
NEUTROPHILS NFR BLD: 63.2 % (ref 44–72)
NRBC # BLD AUTO: 0 K/UL
NRBC BLD-RTO: 0 /100 WBC
PLATELET # BLD AUTO: 280 K/UL (ref 164–446)
PMV BLD AUTO: 10 FL (ref 9–12.9)
POTASSIUM SERPL-SCNC: 3.5 MMOL/L (ref 3.6–5.5)
RBC # BLD AUTO: 4.48 M/UL (ref 4.2–5.4)
SIGNIFICANT IND 70042: ABNORMAL
SITE SITE: ABNORMAL
SODIUM SERPL-SCNC: 141 MMOL/L (ref 135–145)
SOURCE SOURCE: ABNORMAL
WBC # BLD AUTO: 7.2 K/UL (ref 4.8–10.8)

## 2020-01-14 PROCEDURE — A9270 NON-COVERED ITEM OR SERVICE: HCPCS | Performed by: INTERNAL MEDICINE

## 2020-01-14 PROCEDURE — 94640 AIRWAY INHALATION TREATMENT: CPT

## 2020-01-14 PROCEDURE — 700111 HCHG RX REV CODE 636 W/ 250 OVERRIDE (IP): Performed by: STUDENT IN AN ORGANIZED HEALTH CARE EDUCATION/TRAINING PROGRAM

## 2020-01-14 PROCEDURE — 82962 GLUCOSE BLOOD TEST: CPT

## 2020-01-14 PROCEDURE — 700105 HCHG RX REV CODE 258

## 2020-01-14 PROCEDURE — 700102 HCHG RX REV CODE 250 W/ 637 OVERRIDE(OP): Performed by: STUDENT IN AN ORGANIZED HEALTH CARE EDUCATION/TRAINING PROGRAM

## 2020-01-14 PROCEDURE — A9270 NON-COVERED ITEM OR SERVICE: HCPCS | Performed by: STUDENT IN AN ORGANIZED HEALTH CARE EDUCATION/TRAINING PROGRAM

## 2020-01-14 PROCEDURE — 700102 HCHG RX REV CODE 250 W/ 637 OVERRIDE(OP): Performed by: INTERNAL MEDICINE

## 2020-01-14 PROCEDURE — 700101 HCHG RX REV CODE 250: Performed by: INTERNAL MEDICINE

## 2020-01-14 PROCEDURE — 770020 HCHG ROOM/CARE - TELE (206)

## 2020-01-14 PROCEDURE — 700105 HCHG RX REV CODE 258: Performed by: STUDENT IN AN ORGANIZED HEALTH CARE EDUCATION/TRAINING PROGRAM

## 2020-01-14 PROCEDURE — 80048 BASIC METABOLIC PNL TOTAL CA: CPT

## 2020-01-14 PROCEDURE — 71045 X-RAY EXAM CHEST 1 VIEW: CPT

## 2020-01-14 PROCEDURE — 99232 SBSQ HOSP IP/OBS MODERATE 35: CPT | Mod: GC | Performed by: INTERNAL MEDICINE

## 2020-01-14 PROCEDURE — 700101 HCHG RX REV CODE 250: Performed by: STUDENT IN AN ORGANIZED HEALTH CARE EDUCATION/TRAINING PROGRAM

## 2020-01-14 PROCEDURE — 85025 COMPLETE CBC W/AUTO DIFF WBC: CPT

## 2020-01-14 PROCEDURE — 99232 SBSQ HOSP IP/OBS MODERATE 35: CPT | Performed by: INTERNAL MEDICINE

## 2020-01-14 RX ORDER — FERROUS SULFATE 325(65) MG
325 TABLET ORAL
Status: DISCONTINUED | OUTPATIENT
Start: 2020-01-15 | End: 2020-01-15 | Stop reason: HOSPADM

## 2020-01-14 RX ORDER — POTASSIUM CHLORIDE 20 MEQ/1
20 TABLET, EXTENDED RELEASE ORAL ONCE
Status: COMPLETED | OUTPATIENT
Start: 2020-01-14 | End: 2020-01-14

## 2020-01-14 RX ORDER — SODIUM CHLORIDE 9 MG/ML
INJECTION, SOLUTION INTRAVENOUS
Status: COMPLETED
Start: 2020-01-14 | End: 2020-01-14

## 2020-01-14 RX ORDER — FLUTICASONE PROPIONATE 44 UG/1
2 AEROSOL, METERED RESPIRATORY (INHALATION)
Status: DISCONTINUED | OUTPATIENT
Start: 2020-01-14 | End: 2020-01-15 | Stop reason: HOSPADM

## 2020-01-14 RX ORDER — IPRATROPIUM BROMIDE AND ALBUTEROL SULFATE 2.5; .5 MG/3ML; MG/3ML
3 SOLUTION RESPIRATORY (INHALATION)
Status: DISCONTINUED | OUTPATIENT
Start: 2020-01-14 | End: 2020-01-15 | Stop reason: HOSPADM

## 2020-01-14 RX ORDER — IPRATROPIUM BROMIDE AND ALBUTEROL SULFATE 2.5; .5 MG/3ML; MG/3ML
3 SOLUTION RESPIRATORY (INHALATION)
Status: DISCONTINUED | OUTPATIENT
Start: 2020-01-14 | End: 2020-01-14

## 2020-01-14 RX ADMIN — SENNOSIDES AND DOCUSATE SODIUM 2 TABLET: 8.6; 5 TABLET ORAL at 05:38

## 2020-01-14 RX ADMIN — CARVEDILOL 3.12 MG: 3.12 TABLET, FILM COATED ORAL at 05:38

## 2020-01-14 RX ADMIN — IPRATROPIUM BROMIDE AND ALBUTEROL SULFATE 3 ML: .5; 3 SOLUTION RESPIRATORY (INHALATION) at 13:26

## 2020-01-14 RX ADMIN — ASPIRIN 81 MG 81 MG: 81 TABLET ORAL at 05:39

## 2020-01-14 RX ADMIN — SPIRONOLACTONE 25 MG: 25 TABLET ORAL at 05:39

## 2020-01-14 RX ADMIN — POTASSIUM CHLORIDE 20 MEQ: 1500 TABLET, EXTENDED RELEASE ORAL at 08:40

## 2020-01-14 RX ADMIN — CEFTRIAXONE SODIUM 2 G: 2 INJECTION, POWDER, FOR SOLUTION INTRAMUSCULAR; INTRAVENOUS at 20:49

## 2020-01-14 RX ADMIN — LISINOPRIL 5 MG: 5 TABLET ORAL at 05:55

## 2020-01-14 RX ADMIN — CINACALCET HYDROCHLORIDE 30 MG: 30 TABLET, FILM COATED ORAL at 05:38

## 2020-01-14 RX ADMIN — IPRATROPIUM BROMIDE AND ALBUTEROL SULFATE 3 ML: .5; 3 SOLUTION RESPIRATORY (INHALATION) at 22:17

## 2020-01-14 RX ADMIN — ENOXAPARIN SODIUM 40 MG: 100 INJECTION SUBCUTANEOUS at 05:36

## 2020-01-14 RX ADMIN — FUROSEMIDE 20 MG: 20 TABLET ORAL at 05:39

## 2020-01-14 RX ADMIN — SODIUM CHLORIDE 250 ML: 9 INJECTION, SOLUTION INTRAVENOUS at 20:50

## 2020-01-14 RX ADMIN — INSULIN LISPRO 1 UNITS: 100 INJECTION, SOLUTION INTRAVENOUS; SUBCUTANEOUS at 21:36

## 2020-01-14 RX ADMIN — ATORVASTATIN CALCIUM 40 MG: 40 TABLET, FILM COATED ORAL at 17:26

## 2020-01-14 RX ADMIN — IPRATROPIUM BROMIDE AND ALBUTEROL SULFATE 3 ML: .5; 3 SOLUTION RESPIRATORY (INHALATION) at 06:35

## 2020-01-14 RX ADMIN — CARVEDILOL 3.12 MG: 3.12 TABLET, FILM COATED ORAL at 17:26

## 2020-01-14 ASSESSMENT — ENCOUNTER SYMPTOMS
FOCAL WEAKNESS: 0
SORE THROAT: 0
NAUSEA: 0
VOMITING: 0
NERVOUS/ANXIOUS: 0
BRUISES/BLEEDS EASILY: 0
DIARRHEA: 0
FEVER: 0
SPEECH CHANGE: 0
ABDOMINAL PAIN: 0
ORTHOPNEA: 1
PND: 1
CHILLS: 0
SENSORY CHANGE: 0
BLURRED VISION: 0
HEADACHES: 0
WHEEZING: 0
HEMOPTYSIS: 0
COUGH: 1
DOUBLE VISION: 0
PALPITATIONS: 0
SHORTNESS OF BREATH: 0
SHORTNESS OF BREATH: 1
COUGH: 0
SPUTUM PRODUCTION: 0
DIAPHORESIS: 0
BACK PAIN: 0

## 2020-01-14 NOTE — ASSESSMENT & PLAN NOTE
Replete Iron  Serial CBC  Monitor for bleeding  Transfuse per protocols  Outpatient W/u as clinically appropriate

## 2020-01-14 NOTE — CARE PLAN
Problem: Communication  Goal: The ability to communicate needs accurately and effectively will improve  Outcome: MET  Note:   Discussed plan of care     Problem: Safety  Goal: Will remain free from injury  Outcome: MET  Note:   Hourly rounding, 2 pt identifiers used     Problem: Infection  Goal: Will remain free from infection  Outcome: MET  Note:   Labs/culture/abx per order

## 2020-01-14 NOTE — DISCHARGE PLANNING
Care Transition Team Assessment  In the case of an emergency, pt's NOK is Rip Vera (Son) [phone].     This RNCM spoke with the patient at bedside and obtained the information used in this assessment. Pt verified accuracy of facesheet. Pt lives alone in a mobile home. Pt uses the [facility] pharmacy on [facility]. Prior to current hospitalization, pt was independent with ADLS (except for washing her hair) and required assistance with IADLS from her niece, Idalmis. Pt's [facility], Id[place]mis[facility]. Pt collects social security income. Pt denies SA and MH. Pt has support from her family. Pt's discharge plan is home with home oxygen, pending medical clearance.     Upon D/C, pt states that her niece, Idalmis will provide transport home if applicable.     Information Source  Orientation : Oriented x 4  Information Given By: Patient  Informant's Name: Lacey  Who is responsible for making decisions for patient? : Patient    Readmission Evaluation  Is this a readmission?: No    Elopement Risk  Legal Hold: No  Ambulatory or Self Mobile in Wheelchair: No-Not an Elopement Risk  Elopement Risk: Not at Risk for Elopement    Interdisciplinary Discharge Planning  Primary Care Physician: LISA Jackson  Lives with - Patient's Self Care Capacity: Alone and Able to Care For Self  Patient or legal guardian wants to designate a caregiver (see row info): No  Support Systems: Family Member(s)  Prior Services: Intermittent Physical Support for ADL Per Family, Meals on Wheels  Patient Expects to be Discharged to:: home  Durable Medical Equipment: Home Oxygen  DME Provider / Phone: [facility]    Discharge Preparedness  What is your plan after discharge?: Home with help  What are your discharge supports?: Other (comment)(Niece)  Prior Functional Level: Ambulatory, Independent with Activities of Daily Living, Independent with Medication Management  Difficulity with ADLs: Bathing  Difficulty with ADLs Comment:  Pt's neice assists  Difficulity with IADLs: Cooking, Driving, Laundry, Shopping  Difficulity with IADL Comments: Niece assists    Functional Assesment  Prior Functional Level: Ambulatory, Independent with Activities of Daily Living, Independent with Medication Management    Finances  Financial Barriers to Discharge: No  Prescription Coverage: Yes    Vision / Hearing Impairment  Right Eye Vision: Wears Glasses  Left Eye Vision: Wears Glasses  Hearing Impairment: Hearing Device(s) Available, Both Ears      Advance Directive  Advance Directive?: None  Advance Directive offered?: AD Booklet refused    Domestic Abuse  Have you ever been the victim of abuse or violence?: No    Psychological Assessment  History of Substance Abuse: None  History of Psychiatric Problems: No  Non-compliant with Treatment: No  Newly Diagnosed Illness: No      Anticipated Discharge Information  Anticipated discharge disposition: Home  [facility] [place] [address]  Discharge Contact Phone Number: [phone]

## 2020-01-14 NOTE — CARE PLAN
Problem: Bronchoconstriction:  Goal: Improve in air movement and diminished wheezing  Outcome: PROGRESSING AS EXPECTED       Respiratory Update    Treatment modality: SVN  Frequency: TID    Pt tolerating current treatments well with no adverse reactions.

## 2020-01-14 NOTE — PROGRESS NOTES
[facility] Progress Note      Admit Date: 1/10/2020    Resident(s): Sharad Castillo M.D.   Attending:  SONDRA HEARN/ Dr. Thakur    Patient ID:    Name:  Lacey Her     YOB: 1945  Age:  74 y.o.  female   MRN:  [id number]    Hospital Course (carried forward and updated):  Lacey Her is a 74 y.o. female with past medical history significant for type 2 diabetes mellitus, COPD, and NSTEMI s/p CABGx4, congestive heart failure(previous EF 70% 2017), and hyperparathyroidism admitted on 1/10/2019 due to acute hypoxic respiratory failure secondary to presumed sepsis (UTI versus CAP) with worsening confusion resulting into intubation and mechanical ventilation to protect the airway.  Recently she was hospitalized at [place] for heart failure exacerbation per EMS (exact details unknown) no family at bedside for further history.     Patient became hypotensive in the ER upon initiation of propofol, fluid bolus was given but due to her history of heart failure, bedside echo showing low EF, norepinephrine was started to support her blood pressure and a central line was placed.     On admission, patient underwent therapeutic and diagnostic bronchoscopy, BAL sent for culture and sensitivity. She did well with antibiotics and diuretics; subsequent echocardiography showed decreased EF of 30% with focal wall motion abnormalities. Cardiology was consulted for her newly decreased EF and recommended medical management. She was extubated 1/12/20 and by 1/13/20 was started on a dysphagia diet.    Consultants:  Critical Care  Cardiology     Interval Events:  Ambulatory overnight, essentially asymptomatic and feeling ready to go home although not evaluated by PT yet. Nearly on baseline O2.       Review of Systems   Constitutional: Negative for chills and fever.   Eyes: Negative for blurred vision and double vision.   Respiratory: Negative for cough and shortness of breath.    Cardiovascular: Negative for chest pain and leg  "swelling.   Gastrointestinal: Negative for nausea and vomiting.       PHYSICAL EXAM:  Vitals:    01/14/20 0509 01/14/20 0636 01/14/20 0800 01/14/20 0844   BP: 135/82   124/63   Pulse: 74 78 84 77   Resp: 18 13 (!) 21 20   Temp: 36.6 °C (97.9 °F)  36.7 °C (98.1 °F)    TempSrc: Temporal  Temporal    SpO2: 95% 92% 90%    Weight:       Height:        Body mass index is 27.15 kg/m².  Latest Vitals:  /63   Pulse 77   Temp 36.7 °C (98.1 °F) (Temporal)   Resp 20   Ht 1.651 m (5' 5\")   Wt 74 kg (163 lb 2.3 oz)   SpO2 90%   BMI 27.15 kg/m²   O2 therapy: Pulse Oximetry: 90 %, O2 (LPM): 3, O2 Delivery: Silicone Nasal Cannula  Vitals Range last 24h:  Temp:  [36.3 °C (97.3 °F)-36.7 °C (98.1 °F)] 36.7 °C (98.1 °F)  Pulse:  [] 77  Resp:  [13-24] 20  BP: (124-164)/(60-86) 124/63  SpO2:  [90 %-99 %] 90 %       Intake/Output Summary (Last 24 hours) at 1/14/2020 0952  Last data filed at 1/14/2020 0600  Gross per 24 hour   Intake 1310 ml   Output 1350 ml   Net -40 ml        Physical Exam   Constitutional: She is oriented to person, place, and time and well-developed, well-nourished, and in no distress.   HENT:   Head: Normocephalic and atraumatic.   Cardiovascular: Normal rate and regular rhythm. Exam reveals no gallop.   No murmur heard.  Pulmonary/Chest: Effort normal. She has no wheezes. She has no rales.   Abdominal: Soft. Bowel sounds are normal. She exhibits no distension. There is no tenderness.   Musculoskeletal:         General: No edema.   Neurological: She is alert and oriented to person, place, and time.   Follows commands   Skin: Skin is warm and dry.       Recent Labs     01/12/20  0516   ISTATAPH 7.431   ISTATAPCO2 38.5*   ISTATAPO2 74   ISTATATCO2 27   LRTDBWY6SAG 95   ISTATARTHCO3 25.6*   ISTATARTBE 1   ISTATTEMP 37.6 C   ISTATFIO2 35   ISTATSPEC Arterial   ISTATAPHTC 7.422   MWRNXBPM3BL 77     Recent Labs     01/12/20  0526 01/13/20  0445 01/14/20  0540   SODIUM 144 147* 141   POTASSIUM 3.3* 3.6 3.5* "   CHLORIDE 108 106 101   CO2 26 30 29   BUN 21 28* 31*   CREATININE 1.04 0.86 0.88   MAGNESIUM 2.0 2.2  --    PHOSPHORUS 3.3 2.5  --    CALCIUM 9.6 10.4 10.1     Recent Labs     01/12/20 0526 01/13/20 0445 01/13/20 0840 01/14/20  0540   ALTSGPT  --  12  --   --    ASTSGOT  --  10*  --   --    ALKPHOSPHAT  --  76  --   --    TBILIRUBIN  --  0.5  --   --    PREALBUMIN 13.0*  --  12.0*  --    GLUCOSE 104* 113*  --  94     Recent Labs     01/12/20 0526 01/13/20 0445 01/13/20 0840 01/14/20  0540   RBC 4.14* 4.42  --  4.48   HEMOGLOBIN 10.8* 11.5*  --  11.7*   HEMATOCRIT 35.7* 38.3  --  37.9   PLATELETCT 250 287  --  280   IRON  --   --  28*  --    FERRITIN  --   --  70.1  --    TOTIRONBC  --   --  322  --      Recent Labs     01/12/20 0526 01/13/20 0445 01/14/20  0540   WBC 9.6 8.2 7.2   NEUTSPOLYS 77.50* 72.00 63.20   LYMPHOCYTES 11.40* 13.80* 21.60*   MONOCYTES 10.20 10.90 8.50   EOSINOPHILS 0.30 2.20 5.30   BASOPHILS 0.20 0.50 0.60   ASTSGOT  --  10*  --    ALTSGPT  --  12  --    ALKPHOSPHAT  --  76  --    TBILIRUBIN  --  0.5  --        Meds:  • atorvastatin  40 mg     • lisinopril  5 mg     • furosemide  20 mg     • carvedilol  3.125 mg     • spironolactone  25 mg     • cinacalcet  30 mg     • aspirin  81 mg     • insulin lispro  1-6 Units      And   • dextrose 10% bolus  250 mL     • senna-docusate  2 Tab      And   • polyethylene glycol/lytes  1 Packet      And   • magnesium hydroxide  30 mL      And   • bisacodyl  10 mg     • acetaminophen  650 mg     • ipratropium-albuterol  3 mL     • enoxaparin (LOVENOX) injection  40 mg     • ondansetron  4 mg     • cefTRIAXone (ROCEPHIN) IV  2 g Stopped (01/13/20 8561)   • Respiratory Care per Protocol            Imaging:  DX-CHEST-PORTABLE (1 VIEW)   Final Result         1.  Pulmonary edema and/or infiltrates are identified, which are stable since the prior exam.   2.  Cardiomegaly   3.  Atherosclerosis         DX-CHEST-PORTABLE (1 VIEW)   Final Result         1.   Pulmonary edema and/or infiltrates are identified, which are stable since the prior exam.   2.  Cardiomegaly   3.  Atherosclerosis      DX-CHEST-PORTABLE (1 VIEW)   Final Result      No significant interval change.      DX-ABDOMEN FOR TUBE PLACEMENT   Final Result      1.  Feeding tube tip overlies expected location of the gastric body.      EC-ECHOCARDIOGRAM COMPLETE W/ CONT   Final Result      DX-CHEST-FOR LINE PLACEMENT Perform procedure in: Patient's Room   Final Result         1.  Pulmonary edema and/or infiltrates are identified, which are stable since the prior exam.   2.  Cardiomegaly   3.  Atherosclerosis      CT-HEAD W/O   Final Result         1.  No acute intracranial abnormality is identified, there are nonspecific white matter changes, commonly associated with small vessel ischemic disease.  Associated mild cerebral atrophy is noted.   2.  Atherosclerosis.      DX-CHEST-PORTABLE (1 VIEW)   Final Result         1. No focal consolidation or pleural effusions.   2. Emphysema.   3. Well-positioned ETT and NGT.   4. Mild bilateral interstitial prominence, which may be related to mild edema or underlying emphysema.          Problem and Plan:    * Acute respiratory failure with hypoxia (HCC)- (present on admission)  Assessment & Plan  Assessment: Patient presented with relatively rapid onset of acute hypoxic respiratory failure. Does not appear to be sufficient pulmonary edema to explain her respiratory failure and confusion. BNP was elevated however IVC collapsible and not clinically volume overloaded without edema or JVD. Potentially secondary to a community-acquired pneumonia, she was recently hospitalized and this is somewhat at risk for resistant organisms. S/P bronchoscopy, BAL sent for culture and sensitivity. Somewhat less likely could be secondary to COPD exacerbation, however wheezing is not a prominent feature of her presentation although minimally present and this does not usually present so  quickly.  Bedside ultrasound was used to assess for likelihood of pulmonary embolism, right ventricle is roughly normal in size and function and IVC is collapsible.  -Patient was intubated in the field  -Now extubated, near home O2 requirements    Plan:  -Finish ceftriaxone today  -IS, pulmonary toilet  -Ambulate, up to chair for meals    Septic shock (HCC)  Assessment & Plan  This is Septic shock Present on admission  SIRS criteria identified on my evaluation include: Tachycardia, with heart rate greater than 90 BPM, Tachypnea, with respirations greater than 20 per minute and Leukocyosis, with WBC greater than 12,000  Source is UTI vs CAP  Presentation includes: Severe sepsis present, persistent hypotension after 30 ml/kg completed, and initial lactate level result is >= 4 mmol/L.   Despite appropriate fluid resuscitation with crystalloid given per sepsis guidelines, the patient remains hypotensive with systolic blood pressure less than 90 or MAP less than 65  Hemodynamic support with additional fluids and IV vasopressors as needed to maintain a SBP of 90 or MAP of 65  IV antibiotics as appropriate for source of sepsis  Reassessment: I have reassessed the patient's hemodynamic status    -Patient had (QSOFA 3/3, SIRS 3/4 on presentation) with encephalopathy and lactic acidosis, secondary to likely community acquired pneumonia versus urinary tract infection  -Resolved lactic acidosis, patient received bolus of fluids  -Patient is currently intubated and on mechanical ventilation day 3.  Following commands off sedation  -Blood culture negative so far  -Off pressor support  -Now resolved    Plan:  -Finish ceftriaxone, can likely DC tomorrow if cleared by PT  -Monitor input and output    Dyspnea  Assessment & Plan  -Per history obtained by ER physician, acute onset, 1 hour duration  -Bedside echocardiogram showed normal-appearing right ventricle, unlikely to be PE  -No STEMI.  Troponin trends not impressive.  MI to be  unlikely  -Most likely sepsis related  -s/p intubation and on mechanical ventilation  -Extubation 1/12/2020    Heart failure with reduced ejection fraction (HCC)  Assessment & Plan  -Last echocardiogram in 2017 showed EF of 70%.  Bedside echocardiogram showed low EF.  Elevated BNP on admission.  Chest x-ray concerning for interstitial edema. Now that patient has recovered she describes orthopnea prior to admission, consider that her presentation may have been secondary to heart failure exacerbation. Her MR(?secondary) may predispose her to exacerbation with relatively small changes in her volume.  -TTE showed EF 30%, Akinetic mid-distal septum, anterior wall and apex. Severely reduced left ventricular systolic function.  -Transition to home lasix dosing, will titrate as appropriate - may require higher dose of 40mg daily  -ASA/statin  -Started Lisinopril 5mg for HFrEF  -Carvedilol 3.125mg BID  -Spironolactone 25mg  -Potassium replacement  -Cardiology onboard    Community acquired pneumonia  Assessment & Plan  Assessment: Patient presented with early RLL infiltrate, leukocytosis, and acute hypoxic respiratory failure consistent with CAP. Recent admission increases the risk of resistant organisms.  Treating for community-acquired pneumonia and not resistant organisms. Procalcitonin negative.  Flu negative, leukocytosis improving, lactic acidosis resolved.  -Elevated procalcitonin level    Plan:  -Finish C3 today    COPD (chronic obstructive pulmonary disease) (HCC)  Assessment & Plan  -Does not appear to be on exacerbation.  No wheezing on examination  -RT per protocol  -Start home medications  -No indication for prednisone    CAD (coronary artery disease)- (present on admission)  Assessment & Plan  -No aspirin or statin on home med list  -Started ASA/Statin for likely ischemic cardiomyopathy    Iron deficiency anemia  Assessment & Plan  Mild borderline microcytic anemia with low serum iron, low t-sat, low-normal  ferritin indicative of likely iron deficiency anemia.    Order occult blood  Start ferrous sulfate 324mg every other day  Ensure up to date on colonoscopy outpatient    Encephalopathy acute  Assessment & Plan  -CT head negative for any acute abnormality  -Most likely sepsis related  -No neck stiffness on presentation  -Improved.  Alert and oriented.    DM (diabetes mellitus) (HCC)- (present on admission)  Assessment & Plan  -SSI/Accu-Chek/hypoglycemia protocol      DISPO: Transfer orders to floor in, pending PT/OT can probably discharge tomorrow    CODE STATUS: DNAR/I ok    Quality Measures:  Feeding: Diet started  Analgesia: APAP  Sedation: None  Thromboprophylaxis: Lovenox  Head of bed: >30 degrees  Ulcer prophylaxis: None  Glycemic control: SSI  Bowel care: bowel regimen  Indwelling lines: PIV  Deescalation of antibiotics: Today      Sharad Castillo M.D.

## 2020-01-14 NOTE — PROGRESS NOTES
Cardiology Progress Note:                                       Note Author:   Godfrey Cheema D.O.  Date & Time note created:    1/14/2020   11:25 AM       Patient ID:  Name:             Lacey Her     YOB: 1945  Age:                 74 y.o.  female   MRN:               [id number]                                                             ID:  Ms. Her is a 74-year old female with past medical history of Type II DM, hyperparathyroidism, COPD, CAD s/p CABG who was admitted for acute hypoxic respiratory failure secondary to sepsis and pulmonary edema. Patient's echocardiogram showed worsening EF.     Interval history   No acute overnight events. Patient reports that she has been feeling well. She denies any chest pain, shortness of breath or lower extremity edema. Patient was started on coreg and aldactone yesterday which she is tolerating well.     BUN noted to be elevated today. She denies any melena or bleeding       Intake/Output Summary (Last 24 hours) at 1/14/2020 1125  Last data filed at 1/14/2020 0600  Gross per 24 hour   Intake 1230 ml   Output 0 ml   Net 1230 ml       Review of Systems:      Constitutional: Denies fevers, Denies weight changes  Eyes: Denies changes in vision, no eye pain  Ears/Nose/Throat/Mouth: Denies nasal congestion or sore throat   Cardiovascular: Denies chest pain, Denies palpitations   Respiratory: Denies worsening shortness of breath , Denies cough  Gastrointestinal/Hepatic: Denies abdominal pain, nausea, vomiting, diarrhea, constipation or GI bleeding   Genitourinary: Denies dysuria or frequency  Musculoskeletal/Rheum: Denies  joint pain and swelling, Denies edema  Skin: Denies rash  Neurological: Denies headache, confusion, memory loss or focal weakness/parasthesias  All other systems were reviewed and are negative (AMA/CMS criteria)              Active Hospital Problems    Diagnosis   • Septic shock (HCC) [A41.9, R65.21]     Priority: High   • Acute respiratory  failure with hypoxia ([facility]) [J96.01]     Priority: High   • Dyspnea [R06.00]     Priority: Medium   • COPD (chronic obstructive pulmonary disease) ([facility]) [J44.9]     Priority: Medium   • Community acquired pneumonia [J18.9]     Priority: Medium   • Heart failure with reduced ejection fraction ([facility]) [I50.20]     Priority: Medium   • CAD (coronary artery disease) [I25.10]     Priority: Medium   • Iron deficiency anemia [D50.9]     Priority: Low   • Encephalopathy acute [G93.40]     Priority: Low   • DM (diabetes mellitus) ([facility]) [E11.9]     Priority: Low     Hospital Medications:  Current Facility-Administered Medications   Medication Dose   • [START ON 1/15/2020] ferrous sulfate tablet 325 mg  325 mg   • umeclidinium-vilanterol (ANORO ELLIPTA) inhaler 1 Puff  1 Puff    And   • fluticasone (FLOVENT HFA) 44 MCG/ACT inhaler 88 mcg  2 Puff   • ipratropium-albuterol (DUONEB) nebulizer solution  3 mL   • atorvastatin (LIPITOR) tablet 40 mg  40 mg   • lisinopril (PRINIVIL) tablet 5 mg  5 mg   • carvedilol (COREG) tablet 3.125 mg  3.125 mg   • spironolactone (ALDACTONE) tablet 25 mg  25 mg   • cinacalcet (SENSIPAR) tablet 30 mg  30 mg   • aspirin (ASA) chewable tab 81 mg  81 mg   • insulin lispro (HUMALOG) injection 1-6 Units  1-6 Units    And   • DEXTROSE 10% BOLUS 250 mL  250 mL   • senna-docusate (PERICOLACE or SENOKOT S) 8.6-50 MG per tablet 2 Tab  2 Tab    And   • polyethylene glycol/lytes (MIRALAX) PACKET 1 Packet  1 Packet    And   • magnesium hydroxide (MILK OF MAGNESIA) suspension 30 mL  30 mL    And   • bisacodyl (DULCOLAX) suppository 10 mg  10 mg   • acetaminophen (TYLENOL) tablet 650 mg  650 mg   • enoxaparin (LOVENOX) inj 40 mg  40 mg   • ondansetron (ZOFRAN) syringe/vial injection 4 mg  4 mg   • cefTRIAXone (ROCEPHIN) 2 g in  mL IVPB  2 g   • Respiratory Care per Protocol         Physical Exam:  Vitals/ General Appearance:   Weight/BMI: Body mass index is 27.15 kg/m².  /63   Pulse 77   Temp 36.7 °C  "(98.1 °F) (Temporal)   Resp 20   Ht 1.651 m (5' 5\")   Wt 74 kg (163 lb 2.3 oz)   SpO2 90%   Vitals:    01/14/20 0509 01/14/20 0636 01/14/20 0800 01/14/20 0844   BP: 135/82   124/63   Pulse: 74 78 84 77   Resp: 18 13 (!) 21 20   Temp: 36.6 °C (97.9 °F)  36.7 °C (98.1 °F)    TempSrc: Temporal  Temporal    SpO2: 95% 92% 90%    Weight:       Height:         Oxygen Therapy:  Pulse Oximetry: 90 %, O2 (LPM): 3, O2 Delivery: Silicone Nasal Cannula    Constitutional:  Appears older than stated age. Laying flat in bed.    HENMT:  Normocephalic, Atraumatic, Oropharynx moist mucous membranes, No oral exudates, Nose normal.    Eyes:  Conjunctiva normal, No discharge.  Neck:  Normal range of motion, No cervical tenderness,  no JVD.  Cardiovascular: Midline chest incision from prior CABG. Normal heart rate, Normal rhythm, No murmurs, No rubs, No gallops.   Extremitites with intact distal pulses, no cyanosis, or edema.  Lungs:  Mild wheezing in lower bases. No crackles.   Abdomen: Soft, No tenderness, No guarding, No rebound, No masses  Skin: Warm, Dry, No erythema, No rash, no induration.  Neurologic: Alert & oriented x 3, No focal deficits noted, cranial nerves II through XII are grossly intact.  Psychiatric: Affect normal, Judgment normal, Mood normal.       Lab Data Review:  Recent Results (from the past 24 hour(s))   ACCU-CHEK GLUCOSE    Collection Time: 01/13/20 12:02 PM   Result Value Ref Range    Glucose - Accu-Ck 153 (H) 65 - 99 mg/dL   ACCU-CHEK GLUCOSE    Collection Time: 01/13/20  5:57 PM   Result Value Ref Range    Glucose - Accu-Ck 93 65 - 99 mg/dL   ACCU-CHEK GLUCOSE    Collection Time: 01/13/20  9:07 PM   Result Value Ref Range    Glucose - Accu-Ck 96 65 - 99 mg/dL   CBC with Differential    Collection Time: 01/14/20  5:40 AM   Result Value Ref Range    WBC 7.2 4.8 - 10.8 K/uL    RBC 4.48 4.20 - 5.40 M/uL    Hemoglobin 11.7 (L) 12.0 - 16.0 g/dL    Hematocrit 37.9 37.0 - 47.0 %    MCV 84.6 81.4 - 97.8 fL    MCH " 26.1 (L) 27.0 - 33.0 pg    MCHC 30.9 (L) 33.6 - 35.0 g/dL    RDW 49.1 35.9 - 50.0 fL    Platelet Count 280 164 - 446 K/uL    MPV 10.0 9.0 - 12.9 fL    Neutrophils-Polys 63.20 44.00 - 72.00 %    Lymphocytes 21.60 (L) 22.00 - 41.00 %    Monocytes 8.50 0.00 - 13.40 %    Eosinophils 5.30 0.00 - 6.90 %    Basophils 0.60 0.00 - 1.80 %    Immature Granulocytes 0.80 0.00 - 0.90 %    Nucleated RBC 0.00 /100 WBC    Neutrophils (Absolute) 4.55 2.00 - 7.15 K/uL    Lymphs (Absolute) 1.55 1.00 - 4.80 K/uL    Monos (Absolute) 0.61 0.00 - 0.85 K/uL    Eos (Absolute) 0.38 0.00 - 0.51 K/uL    Baso (Absolute) 0.04 0.00 - 0.12 K/uL    Immature Granulocytes (abs) 0.06 0.00 - 0.11 K/uL    NRBC (Absolute) 0.00 K/uL   Basic Metabolic Panel (BMP)    Collection Time: 01/14/20  5:40 AM   Result Value Ref Range    Sodium 141 135 - 145 mmol/L    Potassium 3.5 (L) 3.6 - 5.5 mmol/L    Chloride 101 96 - 112 mmol/L    Co2 29 20 - 33 mmol/L    Glucose 94 65 - 99 mg/dL    Bun 31 (H) 8 - 22 mg/dL    Creatinine 0.88 0.50 - 1.40 mg/dL    Calcium 10.1 8.5 - 10.5 mg/dL    Anion Gap 11.0 0.0 - 11.9   ESTIMATED GFR    Collection Time: 01/14/20  5:40 AM   Result Value Ref Range    GFR If African American >60 >60 mL/min/1.73 m 2    GFR If Non African American >60 >60 mL/min/1.73 m 2       Imaging/Procedures Review:    DX-CHEST-PORTABLE (1 VIEW)   Final Result         1.  Pulmonary edema and/or infiltrates are identified, which are stable since the prior exam.   2.  Cardiomegaly   3.  Atherosclerosis         DX-CHEST-PORTABLE (1 VIEW)   Final Result         1.  Pulmonary edema and/or infiltrates are identified, which are stable since the prior exam.   2.  Cardiomegaly   3.  Atherosclerosis      DX-CHEST-PORTABLE (1 VIEW)   Final Result      No significant interval change.      DX-ABDOMEN FOR TUBE PLACEMENT   Final Result      1.  Feeding tube tip overlies expected location of the gastric body.      EC-ECHOCARDIOGRAM COMPLETE W/ CONT   Final Result         DX-CHEST-FOR LINE PLACEMENT Perform procedure in: Patient's Room   Final Result         1.  Pulmonary edema and/or infiltrates are identified, which are stable since the prior exam.   2.  Cardiomegaly   3.  Atherosclerosis      CT-HEAD W/O   Final Result         1.  No acute intracranial abnormality is identified, there are nonspecific white matter changes, commonly associated with small vessel ischemic disease.  Associated mild cerebral atrophy is noted.   2.  Atherosclerosis.      DX-CHEST-PORTABLE (1 VIEW)   Final Result         1. No focal consolidation or pleural effusions.   2. Emphysema.   3. Well-positioned ETT and NGT.   4. Mild bilateral interstitial prominence, which may be related to mild edema or underlying emphysema.        Reviewed    EKG:   Sinus rhythm, left atrial enlargement, inverted T waves in V2-V5         ECHO:  CONCLUSIONS  Akinetic mid-distal septum, anterior wall and apex.  Severely reduced left ventricular systolic function.  Left ventricular ejection fraction is visually estimated to be 25-30%.  Left ventricle is moderately dilated.  Moderate  mitral regurgitation.  Moderately dilated left atrium.  Structurally normal aortic valve without significant stenosis or   regurgitation.  Mild tricuspid regurgitation.  Normal inferior vena cava size and inspiratory collapse.  Estimated right ventricular systolic pressure  is 40 mmHg.  Normal right ventricular size and systolic function.  No pericardial effusion seen.    MDM (Assessment and Plan):     Active Hospital Problems    Diagnosis   • Septic shock ([facility]) [A41.9, R65.21]     Priority: High   • Acute respiratory failure with hypoxia ([facility]) [J96.01]     Priority: High   • Dyspnea [R06.00]     Priority: Medium   • COPD (chronic obstructive pulmonary disease) ([facility]) [J44.9]     Priority: Medium   • Community acquired pneumonia [J18.9]     Priority: Medium   • Heart failure with reduced ejection fraction ([facility]) [I50.20]     Priority: Medium   • CAD  (coronary artery disease) [I25.10]     Priority: Medium   • Iron deficiency anemia [D50.9]     Priority: Low   • Encephalopathy acute [G93.40]     Priority: Low   • DM (diabetes mellitus) (HCC) [E11.9]     Priority: Low       Assessment and plan    1. Heart failure with reduced ejection fraction,resolved  2. Ischemic cardiomyopathy  3. Coronary artery disease status post CABG   4. COPD   5. Presumed Right lower lobe pneumonia     Patient has ischemic cardiomyopathy and appears to now be euvolemic.   - Recommend continuing optimized medical management   - Continue Carvedilol   - Continue Lisinopril   - Continue Aldactone   - Continue Aspirin and statin   - No plan for further intervention  - Given increased BUN, discontinue po Lasix and reassess volume status   - Outpatient follow up with cardiology to assess for ICD placement and heart failure management ( aware).     Thank you for this consult. Cardiology will now sign off. Please re-consult for any cardiology related questions.

## 2020-01-14 NOTE — PROGRESS NOTES
Critical Care Progress Note    Date of admission  1/10/2020    Chief Complaint  74 y.o. female who presented 1/10/2020 with respiratory failure and encephalopathy.  I attempted to call the patient's sister for history however the person who answered at that phone number did not know the patient.  Apparently she has a  but we do not have contact information for him.       Per ERP she had an hour onset of dyspnea so EMS was called.  In the ER she was in respiratory distress and encephalopathic so was intubated.  No additional history available.      POCUS shows normal biventricular function, small collapsing IVC, no significant valvular regurgitation, a couple scattered B-lines but not significant pulmonary edema    Hospital Course          Interval Problem Update  Reviewed last 24 hour events:    No new complaints  A&O x4  Occasionally hypertensive, overall blood pressure trending down  No ectopy, just intermittent PVCs  T-max 98.1  WBC 7.2  Ceftriaxone  3 L nasal cannula, patient is on home O2  SSI  Aldactone  Lisinopril  K 3.5    Reviewed airways program at length with RT  Several patient's home medicines we do not have and substitutes were discussed    Mobilize  PT eval and road test for safety for possible discharge to skilled nursing unit with therapies  Resume home regimen as clinically appropriate     YESTERDAY  Extubated yesterday  A&O x4  Feels better  Orthopnea  Hypertensive  No ectopy - just PVCs  I/Os neg 1505cc/24 hrs  No fever  ECHO note focal wall motion problems, mod MR, RVSP 40 - EF was 70%  (2/6/2017)  CXRs reviewed - CHFbetter    D/w Cards Team resident at bedside    One more day ABX  Lasix to PO  Add Bp meds? ACE  ASA/Statin   Swallow eval  Cards eval - CAD/EF  Transfer?    Case reviewed at length the bedside with Dr. Mack Bustillo, cardiology and the cardiology team  Reviewed echocardiogram and chest x-ray with team  Formal consult pending, initial conversation suggest conservative approach for  now    Review of Systems  Review of Systems   Constitutional: Positive for malaise/fatigue (Resolving). Negative for diaphoresis and fever.   HENT: Negative for congestion and sore throat.    Eyes: Negative for blurred vision.   Respiratory: Positive for cough (reolving) and shortness of breath (Improved). Negative for hemoptysis, sputum production and wheezing.    Cardiovascular: Positive for orthopnea (Improved), leg swelling and PND (Improved). Negative for chest pain and palpitations.        CHF S/s better   Gastrointestinal: Negative for abdominal pain, diarrhea, nausea and vomiting.   Genitourinary: Negative.    Musculoskeletal: Negative for back pain.   Neurological: Negative for sensory change, speech change, focal weakness and headaches.   Endo/Heme/Allergies: Does not bruise/bleed easily.   Psychiatric/Behavioral: The patient is not nervous/anxious.         Vital Signs for last 24 hours   Temp:  [36.3 °C (97.3 °F)-36.7 °C (98.1 °F)] 36.7 °C (98.1 °F)  Pulse:  [] 84  Resp:  [13-24] 18  BP: (120-164)/(62-86) 120/62  SpO2:  [90 %-99 %] 94 %    Hemodynamic parameters for last 24 hours       Respiratory Information for the last 24 hours       Physical Exam   Physical Exam  Vitals signs and nursing note reviewed.   Constitutional:       Appearance: She is ill-appearing (More chronic than acute). She is not toxic-appearing or diaphoretic.   HENT:      Head: Normocephalic and atraumatic.      Right Ear: External ear normal.      Left Ear: External ear normal.      Nose: No congestion or rhinorrhea.      Mouth/Throat:      Mouth: Mucous membranes are dry.      Pharynx: No oropharyngeal exudate or posterior oropharyngeal erythema.   Eyes:      General: No scleral icterus.     Extraocular Movements: Extraocular movements intact.      Conjunctiva/sclera: Conjunctivae normal.      Pupils: Pupils are equal, round, and reactive to light.   Neck:      Musculoskeletal: Neck supple. No neck rigidity or muscular  tenderness.   Cardiovascular:      Rate and Rhythm: Normal rate and regular rhythm.      Pulses: Normal pulses.      Heart sounds: Normal heart sounds. No murmur.      Comments: SR  Pulmonary:      Effort: No respiratory distress.      Breath sounds: No wheezing, rhonchi or rales.   Abdominal:      General: There is no distension.      Palpations: There is no mass.      Tenderness: There is no tenderness. There is no guarding.   Musculoskeletal:         General: No swelling or tenderness.      Right lower leg: No edema.      Left lower leg: No edema.   Lymphadenopathy:      Cervical: No cervical adenopathy.   Skin:     Coloration: Skin is not jaundiced or pale.      Findings: No bruising, erythema, lesion or rash.   Neurological:      General: No focal deficit present.      Mental Status: She is alert.      Cranial Nerves: No cranial nerve deficit.      Sensory: No sensory deficit.      Motor: No weakness.      Coordination: Coordination normal.      Deep Tendon Reflexes: Reflexes normal.   Psychiatric:         Attention and Perception: Attention normal.         Mood and Affect: Mood normal.         Speech: Speech normal.         Behavior: Behavior normal.         Cognition and Memory: Cognition and memory normal.         Medications  Current Facility-Administered Medications   Medication Dose Route Frequency Provider Last Rate Last Dose   • [START ON 1/15/2020] ferrous sulfate tablet 325 mg  325 mg Oral Q48HRS Sharad Castillo M.D.       • umeclidinium-vilanterol (ANORO ELLIPTA) inhaler 1 Puff  1 Puff Inhalation QDAILY (RT) Sharad Castillo M.D.   Stopped at 01/14/20 1045    And   • fluticasone (FLOVENT HFA) 44 MCG/ACT inhaler 88 mcg  2 Puff Inhalation QDAILY (RT) Sharad Castillo M.D.   Stopped at 01/14/20 1045   • ipratropium-albuterol (DUONEB) nebulizer solution  3 mL Nebulization Q4H PRN (RT) Sharad Castillo M.D.   3 mL at 01/14/20 1326   • albuterol (PROVENTIL) 2.5mg/0.5ml nebulizer solution 2.5 mg  2.5 mg  Nebulization TID Sharad Castillo M.D.       • atorvastatin (LIPITOR) tablet 40 mg  40 mg Oral Q EVENING Sharad Castillo M.D.   40 mg at 01/13/20 1753   • lisinopril (PRINIVIL) tablet 5 mg  5 mg Oral Q DAY Sharad Castillo M.D.   5 mg at 01/14/20 0555   • carvedilol (COREG) tablet 3.125 mg  3.125 mg Oral BID WITH MEALS Irtclaudia Cheema, D.O.   3.125 mg at 01/14/20 0538   • spironolactone (ALDACTONE) tablet 25 mg  25 mg Oral Q DAY Irtclaudia Cheema, D.O.   25 mg at 01/14/20 0539   • cinacalcet (SENSIPAR) tablet 30 mg  30 mg Oral DAILY Tariq Thakur M.D.   30 mg at 01/14/20 0538   • aspirin (ASA) chewable tab 81 mg  81 mg Oral DAILY Tariq Thakur M.D.   81 mg at 01/14/20 0539   • insulin lispro (HUMALOG) injection 1-6 Units  1-6 Units Subcutaneous 4X/DAY ACHS Tariq Thakur M.D.   Stopped at 01/13/20 1700    And   • DEXTROSE 10% BOLUS 250 mL  250 mL Intravenous Q15 MIN PRN Tariq Thakur M.D.       • senna-docusate (PERICOLACE or SENOKOT S) 8.6-50 MG per tablet 2 Tab  2 Tab Oral BID Tariq Thakur M.D.   2 Tab at 01/14/20 0538    And   • polyethylene glycol/lytes (MIRALAX) PACKET 1 Packet  1 Packet Oral QDAY PRN Tariq Thakur M.D.        And   • magnesium hydroxide (MILK OF MAGNESIA) suspension 30 mL  30 mL Oral QDAY PRN Tariq Thakur M.D.        And   • bisacodyl (DULCOLAX) suppository 10 mg  10 mg Rectal QDAY PRN Tariq Thakur M.D.       • acetaminophen (TYLENOL) tablet 650 mg  650 mg Oral Q4HRS PRN Tariq Thakur M.D.       • enoxaparin (LOVENOX) inj 40 mg  40 mg Subcutaneous DAILY Nancy Pickard M.D.   40 mg at 01/14/20 0536   • ondansetron (ZOFRAN) syringe/vial injection 4 mg  4 mg Intravenous Q4HRS PRN Rusty Slois M.D.   4 mg at 01/11/20 1715   • cefTRIAXone (ROCEPHIN) 2 g in  mL IVPB  2 g Intravenous Q24HRS Sharad Castillo M.D.   Stopped at 01/13/20 2134   • Respiratory Care per Protocol   Nebulization Continuous RT Sharad Castillo M.D.           Fluids    Intake/Output  Summary (Last 24 hours) at 1/14/2020 1402  Last data filed at 1/14/2020 0600  Gross per 24 hour   Intake 640 ml   Output --   Net 640 ml       Laboratory  Recent Labs     01/12/20  0516   ISTATAPH 7.431   ISTATAPCO2 38.5*   ISTATAPO2 74   ISTATATCO2 27   ITADIZP9COZ 95   ISTATARTHCO3 25.6*   ISTATARTBE 1   ISTATTEMP 37.6 C   ISTATFIO2 35   ISTATSPEC Arterial   ISTATAPHTC 7.422   [id number] 77         Recent Labs     01/12/20 0526 01/13/20 0445 01/14/20  0540   SODIUM 144 147* 141   POTASSIUM 3.3* 3.6 3.5*   CHLORIDE 108 106 101   CO2 26 30 29   BUN 21 28* 31*   CREATININE 1.04 0.86 0.88   MAGNESIUM 2.0 2.2  --    PHOSPHORUS 3.3 2.5  --    CALCIUM 9.6 10.4 10.1     Recent Labs     01/12/20 0526 01/13/20 0445 01/13/20 0840 01/14/20  0540   ALTSGPT  --  12  --   --    ASTSGOT  --  10*  --   --    ALKPHOSPHAT  --  76  --   --    TBILIRUBIN  --  0.5  --   --    PREALBUMIN 13.0*  --  12.0*  --    GLUCOSE 104* 113*  --  94     Recent Labs     01/12/20 0526 01/13/20 0445 01/14/20  0540   WBC 9.6 8.2 7.2   NEUTSPOLYS 77.50* 72.00 63.20   LYMPHOCYTES 11.40* 13.80* 21.60*   MONOCYTES 10.20 10.90 8.50   EOSINOPHILS 0.30 2.20 5.30   BASOPHILS 0.20 0.50 0.60   ASTSGOT  --  10*  --    ALTSGPT  --  12  --    ALKPHOSPHAT  --  76  --    TBILIRUBIN  --  0.5  --      Recent Labs     01/12/20 0526 01/13/20 0445 01/13/20 0840 01/14/20  0540   RBC 4.14* 4.42  --  4.48   HEMOGLOBIN 10.8* 11.5*  --  11.7*   HEMATOCRIT 35.7* 38.3  --  37.9   PLATELETCT 250 287  --  280   IRON  --   --  28*  --    FERRITIN  --   --  70.1  --    TOTIRONBC  --   --  322  --        Imaging  X-Ray:  I have personally reviewed the images and compared with prior images.  EKG:  I have personally reviewed the images and compared with prior images.  CT:    Reviewed  Echo:   Reviewed    Assessment/Plan  * Acute respiratory failure with hypoxia (HCC)- (present on admission)  Assessment & Plan  Component of acute on chronic, patient on O2 3-4 L/min by nasal  cannula at home  Secondary to septic shock from pna, improved  Suspect significant cardiac component with poor ef - 30%, down from 70% 2 years ago  Initial events came on fairly suddenly, chest x-ray looks more like pulmonary edema  Also suspect a component of COPD contributing  Intubated 1/11  Extubated 1/12  Rt/o2 protocols  Incentive spirometry  Mobilize  Resuscitated, now diuresing, improved with Lasix, negative fluid balance    Septic shock (HCC)  Assessment & Plan  This is Septic shock Present on admission  SIRS criteria identified on my evaluation include: Tachycardia, with heart rate greater than 90 BPM, Tachypnea, with respirations greater than 20 per minute and Leukocyosis, with WBC greater than 12,000  Source is urinary vs pulmonary  Presentation includes: Severe sepsis present, persistent hypotension after 30 ml/kg completed, and initial lactate level result is >= 4 mmol/L.   Despite appropriate fluid resuscitation with crystalloid given per sepsis guidelines, the patient remains hypotensive with systolic blood pressure less than 90 or MAP less than 65  Hemodynamic support with additional fluids and IV vasopressors as needed to maintain a SBP of 90 or MAP of 65  IV antibiotics as appropriate for source of sepsis  Reassessment: I have reassessed the patient's hemodynamic status    Urinary +/- PNA with RLL infiltrate, follow-up x-ray looks like improving pulmonary edema  CTX, azithro-plan 5-day course stop 1/15  LV initially looked normal function with small and collapsing IVC, then function appeared ~30% after fluid resuscitation so no additional fluid given, formal echo reveals EF 30% with focal wall motion abnormalities  S/p bronch, UC negative so far  Continue postresuscitation diuresis      Dyspnea  Assessment & Plan  History obtained by ERP was 1 hour of acute onet dyspnea.  This is concerning for PE or MI.  Low suspicion for PE given alternate etiologies and normal appearing RV on echo. MI certainly  possible.  No STEMI, but + lateral and high lateral STD. Trend trops.  Probably related to her sepsis +/- PNA.  S. pneumo can come on quickly.  Echocardiogram shows EF down from 70% 2 years ago to 30% now.  Cardiology consultation pending, forced diuresis continues  Returning to baseline    Heart failure with reduced ejection fraction (HCC)  Assessment & Plan  Normal EF 2017, 70%  Initially normal EF on bedside TTE, then after fluids EF appeared about 30%  Formal TTE reveals EF 30% with moderate MR  No additional fluid now, forced diuresis as needed ongoing  No BB or ACE given shock, initiate therapy when clinically appropriate  Cardiology consultation noted, Cards to follow-up as an outpatient      Community acquired pneumonia  Assessment & Plan  Apparently hospitalized recently for CHF, but we don't have additional history about this  DRIP score 2 based on history we have  Treating CAP not resistant organisms  PCR Flu studies neg  Echo with EF 30%, down from 70% 2 years ago  X-ray better with diuresis  Suspect a component of COPD and pulmonary edema perhaps more likely than CAP, treating for all of the above      COPD (chronic obstructive pulmonary disease) ([facility])  Assessment & Plan  Not wheezing on exam but significant COPD clearly contributing here I suspect  duoneb every 4 hours while awake and every 2 hours as needed  No indication for prednisone, monitoring  Update vaccines prior to discharge  RT protocols  Mobilize  Patient is on home O2, 3-4 L.  She already has a home nebulizer    CAD (coronary artery disease)- (present on admission)  Assessment & Plan  No ASA, statin on home meds list  Cardiology evaluation, EF less than half year was 2 years ago  Conservative management per cardiology, see full note, case discussed with Dr. Nevarez    Iron deficiency anemia  Assessment & Plan  Replete Iron  Serial CBC  Monitor for bleeding  Transfuse per protocols  Outpatient W/u as clinically appropriate    Encephalopathy  acute  Assessment & Plan  Possibly sepsis related, improved  CTH reassuring  No neck stiffness concerning for meningitis  Following commands on propofol 1/11-12, back to neurologic baseline off the ventilator 1/12    DM (diabetes mellitus) (HCC)- (present on admission)  Assessment & Plan  SSI  Blood sugars testing will be changed from every 6 to AC/at bedtime  Hypoglycemia protocols  Blood sugars well controlled now       VTE:  Heparin  Ulcer: H2 Antagonist  Lines: Central Line  Ongoing indication addressed and Stevens Catheter  Ongoing indication addressed    I have performed a physical exam and reviewed and updated ROS and Plan today (1/14/2020). In review of yesterday's note (1/13/2020), there are no changes except as documented above.     Discussed patient condition and risk of morbidity and/or mortality with RN, RT, Pharmacy, , Charge nurse / hot rounds, Patient and cardiology     Cards eval noted, conservative medical noninvasive approach for now  Okay to transfer out of [facility]  PT eval ongoing, SNU transfer

## 2020-01-14 NOTE — DISCHARGE SUMMARY
Internal Medicine Discharge Summary  Note Author: Jacinto Feng M.D.       Name Lacey Her 1945   Age/Sex 74 y.o. female   MRN [id number]         Admit Date:  1/10/2020       Discharge Date:   1/15/2020    Service:   [facility] Internal Medicine Gold Team  Attending Physician(s):   Dr. Thakur       Senior Resident(s):   Dr Chaparro/Dr Castillo  PCP: MIKE Jackson      Primary Diagnosis:   Acute hypoxic respiratory failure secondary to pneumonia and pulmonary edema    Secondary Diagnoses:                Heart failure with reduced ejection fraction 30% - newly decreased EF  Community acquired pneumonia  COPD - no exacerbation, chronic  Type 2 Diabetes mellitus  Iron deficiency anemia    Hospital Summary (Brief Narrative):       Lacey Her is a 74 y.o. female with past medical history significant for type 2 diabetes mellitus, COPD, and NSTEMI s/p CABGx4, congestive heart failure(previous EF 70% ), and hyperparathyroidism admitted on 1/10/2019 due to acute hypoxic respiratory failure secondary to presumed sepsis and heart failure with worsening confusion resulting into intubation to protect her airway.  Recently she was hospitalized at [place] for heart failure exacerbation per EMS, although exact details remain unknown.  She became hypotensive in the ER upon initiation of propofol and required pressors for hemodynamic support.      On admission, patient underwent therapeutic and diagnostic bronchoscopy, BAL sent for culture and sensitivity. She did well with antibiotics and diuretics; subsequent echocardiography showed decreased EF of 30% with focal wall motion abnormalities. Cardiology was consulted for her newly decreased EF and recommended medical management with outpatient follow-up for ICD evaluation. She was extubated 20 and by 20 was started on a dysphagia diet. She was evaluated by physical and occupational therapy and was subsequently discharged in stable  condition to her home on 1/15/2020 with scheduled primary care and cardiology follow-up.        Patient /Hospital Summary (Details -- Problem Oriented) :          Community acquired pneumonia  Assessment & Plan  Assessment: Patient presented with early RLL infiltrate, leukocytosis, and acute hypoxic respiratory failure consistent with CAP. Recent admission increases the risk of resistant organisms.  Treating for community-acquired pneumonia and not resistant organisms. Procalcitonin negative.  Flu negative, leukocytosis improving, lactic acidosis resolved.      Plan:  - Resolved  - DC home, cleared by PT/OT    * Acute respiratory failure with hypoxia (HCC)  Assessment & Plan  Assessment: Patient presented with relatively rapid onset of acute hypoxic respiratory failure. Does not appear to be sufficient pulmonary edema to explain her respiratory failure and confusion. BNP was elevated however IVC collapsible and not clinically volume overloaded without edema or JVD. Potentially secondary to a community-acquired pneumonia, she was recently hospitalized and this is somewhat at risk for resistant organisms. S/P bronchoscopy, BAL sent for culture and sensitivity. Somewhat less likely could be secondary to COPD exacerbation, however wheezing is not a prominent feature of her presentation although minimally present and this does not usually present so quickly.  Bedside ultrasound was used to assess for likelihood of pulmonary embolism, right ventricle is roughly normal in size and function and IVC is collapsible.  -Patient was intubated in the field  -Now extubated, near home O2 requirements  - Patient was evaluated by PT/OT on 1/15 recommending DC home.    Plan:  -Home with Follow up PCP  -IS, pulmonary toilet  -Ambulate, up to chair for meals    Septic shock (HCC)-resolved as of 1/15/2020  Assessment & Plan  This is Septic shock Present on admission  SIRS criteria identified on my evaluation include: Tachycardia, with  heart rate greater than 90 BPM, Tachypnea, with respirations greater than 20 per minute and Leukocyosis, with WBC greater than 12,000  Source is UTI vs CAP  Presentation includes: Severe sepsis present, persistent hypotension after 30 ml/kg completed, and initial lactate level result is >= 4 mmol/L.   Despite appropriate fluid resuscitation with crystalloid given per sepsis guidelines, the patient remains hypotensive with systolic blood pressure less than 90 or MAP less than 65  Hemodynamic support with additional fluids and IV vasopressors as needed to maintain a SBP of 90 or MAP of 65  IV antibiotics as appropriate for source of sepsis  Reassessment: I have reassessed the patient's hemodynamic status    -Patient had (QSOFA 3/3, SIRS 3/4 on presentation) with encephalopathy and lactic acidosis, secondary to likely community acquired pneumonia versus urinary tract infection  -Resolved lactic acidosis, patient received bolus of fluids  -Patient is currently intubated and on mechanical ventilation day 3.  Following commands off sedation  -Blood culture negative so far  -Off pressor support  -Now resolved    Plan:  -Finish ceftriaxone, can likely DC tomorrow if cleared by PT  -Monitor input and output    Dyspnea  Assessment & Plan  -Per history obtained by ER physician, acute onset, 1 hour duration  -Bedside echocardiogram showed normal-appearing right ventricle, unlikely to be PE  -No STEMI.  Troponin trends not impressive.  MI to be unlikely  -Most likely sepsis related  -s/p intubation and on mechanical ventilation  -Extubation 1/12/2020    Heart failure with reduced ejection fraction (HCC)  Assessment & Plan  -Last echocardiogram in 2017 showed EF of 70%.  Bedside echocardiogram showed low EF.  Elevated BNP on admission.  Chest x-ray concerning for interstitial edema. Now that patient has recovered she describes orthopnea prior to admission, consider that her presentation may have been secondary to heart failure  exacerbation. Her MR(?secondary) may predispose her to exacerbation with relatively small changes in her volume.  -TTE showed EF 30%, Akinetic mid-distal septum, anterior wall and apex. Severely reduced left ventricular systolic function.  -Transition to home lasix dosing, will titrate as appropriate - may require higher dose of 40mg daily  -ASA/statin  -Started Lisinopril 5mg for HFrEF  -Carvedilol 3.125mg BID  -Spironolactone 25mg  -Potassium replacement  -Cardiology onboard, will follow up out patient    COPD (chronic obstructive pulmonary disease) ([facility])  Assessment & Plan  -Does not appear to be on exacerbation.  No wheezing on examination  -RT per protocol  -Start home medications  -No indication for prednisone    CAD (coronary artery disease)  Assessment & Plan  -No aspirin or statin on home med list  -Started ASA/Statin for likely ischemic cardiomyopathy    Iron deficiency anemia  Assessment & Plan  Mild borderline microcytic anemia with low serum iron, low t-sat, low-normal ferritin indicative of likely iron deficiency anemia.    Order occult blood  Start ferrous sulfate 324mg every other day  Ensure up to date on colonoscopy outpatient    Encephalopathy acute  Assessment & Plan  -CT head negative for any acute abnormality  -Most likely sepsis related  -No neck stiffness on presentation  -Improved.  Alert and oriented.    DM (diabetes mellitus) ([facility])-resolved as of 1/15/2020  Assessment & Plan  -SSI/Accu-Chek/hypoglycemia protocol      Consultants:     Cardiology    Procedures:        Central line placed 1/10  Intubation 1/10-1/12  Bronchoscopy    Imaging/ Testing:      Echocardiogram 1/11 - 30% EF, akinetic mid-distal septum, anterior wall, and apex with moderate LV dilation and moderate mitral regurgitation.    DX-CHEST-PORTABLE (1 VIEW)   Final Result         1.  Pulmonary edema and/or infiltrates are identified, which are stable since the prior exam.   2.  Cardiomegaly   3.  Atherosclerosis          DX-CHEST-PORTABLE (1 VIEW)   Final Result         1.  Pulmonary edema and/or infiltrates are identified, which are stable since the prior exam.   2.  Cardiomegaly   3.  Atherosclerosis      DX-CHEST-PORTABLE (1 VIEW)   Final Result      No significant interval change.      DX-ABDOMEN FOR TUBE PLACEMENT   Final Result      1.  Feeding tube tip overlies expected location of the gastric body.      EC-ECHOCARDIOGRAM COMPLETE W/ CONT   Final Result      DX-CHEST-FOR LINE PLACEMENT Perform procedure in: Patient's Room   Final Result         1.  Pulmonary edema and/or infiltrates are identified, which are stable since the prior exam.   2.  Cardiomegaly   3.  Atherosclerosis      CT-HEAD W/O   Final Result         1.  No acute intracranial abnormality is identified, there are nonspecific white matter changes, commonly associated with small vessel ischemic disease.  Associated mild cerebral atrophy is noted.   2.  Atherosclerosis.      DX-CHEST-PORTABLE (1 VIEW)   Final Result         1. No focal consolidation or pleural effusions.   2. Emphysema.   3. Well-positioned ETT and NGT.   4. Mild bilateral interstitial prominence, which may be related to mild edema or underlying emphysema.            Discharge Medications:         Medication Reconciliation: Completed       Medication List      START taking these medications      Instructions   aspirin 81 MG Chew chewable tablet  Start taking on:  January 16, 2020  Commonly known as:  ASA   Take 1 Tab by mouth every day.  Dose:  81 mg     atorvastatin 40 MG Tabs  Commonly known as:  LIPITOR   Take 1 Tab by mouth every evening.  Dose:  40 mg     carvedilol 3.125 MG Tabs  Commonly known as:  COREG   Take 1 Tab by mouth 2 times a day, with meals.  Dose:  3.125 mg     lisinopril 5 MG Tabs  Start taking on:  January 16, 2020  Commonly known as:  PRINIVIL   Take 1 Tab by mouth every day.  Dose:  5 mg     spironolactone 25 MG Tabs  Start taking on:  January 16, 2020  Commonly known as:   ALDACTONE   Take 1 Tab by mouth every day.  Dose:  25 mg        CONTINUE taking these medications      Instructions   cinacalcet 30 MG Tabs  Commonly known as:  SENSIPAR   Take 30 mg by mouth every day.  Dose:  30 mg     furosemide 20 MG Tabs  Commonly known as:  LASIX   Take 20 mg by mouth every day.  Dose:  20 mg     metFORMIN 500 MG Tabs  Commonly known as:  GLUCOPHAGE   Take 500 mg by mouth 2 times a day, with meals.  Dose:  500 mg     metoprolol  MG Tb24  Commonly known as:  TOPROL XL   Take 100 mg by mouth every day.  Dose:  100 mg     potassium chloride SA 20 MEQ Tbcr  Commonly known as:  Kdur   Take 20 mEq by mouth every day.  Dose:  20 mEq     TRELEGY ELLIPTA 100-62.5-25 MCG/INH Aepb  Generic drug:  Fluticasone-Umeclidin-Vilant   Inhale 1 Puff by mouth 2 Times a Day.  Dose:  1 Puff     VENTOLIN  (90 Base) MCG/ACT Aers inhalation aerosol  Generic drug:  albuterol   Inhale 1 Puff by mouth every 6 hours as needed for Shortness of Breath.  Dose:  1 Puff              Disposition:   Home    Diet:   Cardiac diet    Activity:   As tolerated    Instructions:      Follow up with your primary care doctor and cardiology   The patient was instructed to return to the ER in the event of worsening symptoms. I have counseled the patient on the importance of compliance and the patient has agreed to proceed with all medical recommendations and follow up plan indicated above.   The patient understands that all medications come with benefits and risks. Risks may include permanent injury or death and these risks can be minimized with close reassessment and monitoring.        Primary Care Provider:    MIKE Jackson  Discharge summary faxed to primary care provider:  Completed  Copy of discharge summary given to the patient: Completed      Follow up appointment details :      Future Appointments   Date Time Provider Department Center   4/14/2020  2:00 PM JEB Olson  [facility]  [address] [address]  [place] [id number]  [phone]    Go on 1/20/2020  Please arrive at 1:15pm for your hospital follow up, please get a referral for cardiology.  Thank you.       Pending Studies:        None    Time spent on discharge day patient visit, preparing discharge paperwork and arranging for patient follow up.    Summary of follow up issues:   Heart failure with reduced ejection fraction - needs titration of beta blocker, ace inhibitor, furosemide. Will need monitoring of potassium. Also needs evaluation for ICD in 6 weeks per cardiology.  Iron deficiency anemia - needs follow-up iron panel/CBC, additionally recommend PCP to ensure age-appropriate cancer screening is up to date.    Discharge Time (Minutes) :    40  Hospital Course Type:  Inpatient Stay >2 midnights

## 2020-01-15 VITALS
DIASTOLIC BLOOD PRESSURE: 73 MMHG | HEIGHT: 65 IN | BODY MASS INDEX: 26.3 KG/M2 | SYSTOLIC BLOOD PRESSURE: 128 MMHG | OXYGEN SATURATION: 95 % | TEMPERATURE: 98.3 F | WEIGHT: 157.85 LBS | RESPIRATION RATE: 18 BRPM | HEART RATE: 66 BPM

## 2020-01-15 PROBLEM — E11.9 DM (DIABETES MELLITUS) (HCC): Status: RESOLVED | Noted: 2017-02-06 | Resolved: 2020-01-15

## 2020-01-15 PROBLEM — R65.21 SEPTIC SHOCK (HCC): Status: RESOLVED | Noted: 2020-01-10 | Resolved: 2020-01-15

## 2020-01-15 PROBLEM — A41.9 SEPTIC SHOCK (HCC): Status: RESOLVED | Noted: 2020-01-10 | Resolved: 2020-01-15

## 2020-01-15 LAB
ANION GAP SERPL CALC-SCNC: 8 MMOL/L (ref 0–11.9)
BACTERIA BLD CULT: NORMAL
BACTERIA BLD CULT: NORMAL
BASOPHILS # BLD AUTO: 0.6 % (ref 0–1.8)
BASOPHILS # BLD: 0.04 K/UL (ref 0–0.12)
BUN SERPL-MCNC: 24 MG/DL (ref 8–22)
CALCIUM SERPL-MCNC: 9.7 MG/DL (ref 8.5–10.5)
CHLORIDE SERPL-SCNC: 103 MMOL/L (ref 96–112)
CO2 SERPL-SCNC: 29 MMOL/L (ref 20–33)
CREAT SERPL-MCNC: 0.75 MG/DL (ref 0.5–1.4)
EOSINOPHIL # BLD AUTO: 0.48 K/UL (ref 0–0.51)
EOSINOPHIL NFR BLD: 7 % (ref 0–6.9)
ERYTHROCYTE [DISTWIDTH] IN BLOOD BY AUTOMATED COUNT: 47.6 FL (ref 35.9–50)
GLUCOSE BLD-MCNC: 94 MG/DL (ref 65–99)
GLUCOSE BLD-MCNC: 95 MG/DL (ref 65–99)
GLUCOSE SERPL-MCNC: 96 MG/DL (ref 65–99)
HCT VFR BLD AUTO: 36 % (ref 37–47)
HGB BLD-MCNC: 11.1 G/DL (ref 12–16)
IMM GRANULOCYTES # BLD AUTO: 0.06 K/UL (ref 0–0.11)
IMM GRANULOCYTES NFR BLD AUTO: 0.9 % (ref 0–0.9)
LYMPHOCYTES # BLD AUTO: 1.41 K/UL (ref 1–4.8)
LYMPHOCYTES NFR BLD: 20.7 % (ref 22–41)
MCH RBC QN AUTO: 26 PG (ref 27–33)
MCHC RBC AUTO-ENTMCNC: 30.8 G/DL (ref 33.6–35)
MCV RBC AUTO: 84.3 FL (ref 81.4–97.8)
MONOCYTES # BLD AUTO: 0.65 K/UL (ref 0–0.85)
MONOCYTES NFR BLD AUTO: 9.5 % (ref 0–13.4)
NEUTROPHILS # BLD AUTO: 4.18 K/UL (ref 2–7.15)
NEUTROPHILS NFR BLD: 61.3 % (ref 44–72)
NRBC # BLD AUTO: 0 K/UL
NRBC BLD-RTO: 0 /100 WBC
PLATELET # BLD AUTO: 291 K/UL (ref 164–446)
PMV BLD AUTO: 10.5 FL (ref 9–12.9)
POTASSIUM SERPL-SCNC: 3.5 MMOL/L (ref 3.6–5.5)
RBC # BLD AUTO: 4.27 M/UL (ref 4.2–5.4)
SIGNIFICANT IND 70042: NORMAL
SIGNIFICANT IND 70042: NORMAL
SITE SITE: NORMAL
SITE SITE: NORMAL
SODIUM SERPL-SCNC: 140 MMOL/L (ref 135–145)
SOURCE SOURCE: NORMAL
SOURCE SOURCE: NORMAL
WBC # BLD AUTO: 6.8 K/UL (ref 4.8–10.8)

## 2020-01-15 PROCEDURE — 700102 HCHG RX REV CODE 250 W/ 637 OVERRIDE(OP): Performed by: INTERNAL MEDICINE

## 2020-01-15 PROCEDURE — A9270 NON-COVERED ITEM OR SERVICE: HCPCS | Performed by: STUDENT IN AN ORGANIZED HEALTH CARE EDUCATION/TRAINING PROGRAM

## 2020-01-15 PROCEDURE — 94640 AIRWAY INHALATION TREATMENT: CPT

## 2020-01-15 PROCEDURE — 97162 PT EVAL MOD COMPLEX 30 MIN: CPT

## 2020-01-15 PROCEDURE — 85025 COMPLETE CBC W/AUTO DIFF WBC: CPT

## 2020-01-15 PROCEDURE — 700102 HCHG RX REV CODE 250 W/ 637 OVERRIDE(OP): Performed by: STUDENT IN AN ORGANIZED HEALTH CARE EDUCATION/TRAINING PROGRAM

## 2020-01-15 PROCEDURE — 99238 HOSP IP/OBS DSCHRG MGMT 30/<: CPT | Mod: GC | Performed by: INTERNAL MEDICINE

## 2020-01-15 PROCEDURE — 99232 SBSQ HOSP IP/OBS MODERATE 35: CPT | Performed by: INTERNAL MEDICINE

## 2020-01-15 PROCEDURE — 36415 COLL VENOUS BLD VENIPUNCTURE: CPT

## 2020-01-15 PROCEDURE — 80048 BASIC METABOLIC PNL TOTAL CA: CPT

## 2020-01-15 PROCEDURE — A9270 NON-COVERED ITEM OR SERVICE: HCPCS | Performed by: INTERNAL MEDICINE

## 2020-01-15 PROCEDURE — 94760 N-INVAS EAR/PLS OXIMETRY 1: CPT

## 2020-01-15 PROCEDURE — 700111 HCHG RX REV CODE 636 W/ 250 OVERRIDE (IP): Performed by: STUDENT IN AN ORGANIZED HEALTH CARE EDUCATION/TRAINING PROGRAM

## 2020-01-15 PROCEDURE — 82962 GLUCOSE BLOOD TEST: CPT

## 2020-01-15 PROCEDURE — 97165 OT EVAL LOW COMPLEX 30 MIN: CPT

## 2020-01-15 PROCEDURE — 700101 HCHG RX REV CODE 250: Performed by: STUDENT IN AN ORGANIZED HEALTH CARE EDUCATION/TRAINING PROGRAM

## 2020-01-15 RX ORDER — ATORVASTATIN CALCIUM 40 MG/1
40 TABLET, FILM COATED ORAL EVERY EVENING
Qty: 30 TAB | Refills: 0 | Status: SHIPPED | OUTPATIENT
Start: 2020-01-15

## 2020-01-15 RX ORDER — POTASSIUM CHLORIDE 20 MEQ/1
20 TABLET, EXTENDED RELEASE ORAL ONCE
Status: COMPLETED | OUTPATIENT
Start: 2020-01-15 | End: 2020-01-15

## 2020-01-15 RX ORDER — SPIRONOLACTONE 25 MG/1
25 TABLET ORAL DAILY
Qty: 30 TAB | Refills: 3 | Status: SHIPPED | OUTPATIENT
Start: 2020-01-16

## 2020-01-15 RX ORDER — CARVEDILOL 3.12 MG/1
3.12 TABLET ORAL 2 TIMES DAILY WITH MEALS
Qty: 60 TAB | Refills: 0 | Status: SHIPPED | OUTPATIENT
Start: 2020-01-15

## 2020-01-15 RX ORDER — LISINOPRIL 5 MG/1
5 TABLET ORAL DAILY
Qty: 30 TAB | Refills: 0 | Status: SHIPPED | OUTPATIENT
Start: 2020-01-16

## 2020-01-15 RX ORDER — ASPIRIN 81 MG/1
81 TABLET, CHEWABLE ORAL DAILY
Qty: 100 TAB | Refills: 0 | Status: SHIPPED | OUTPATIENT
Start: 2020-01-16

## 2020-01-15 RX ADMIN — ASPIRIN 81 MG 81 MG: 81 TABLET ORAL at 05:25

## 2020-01-15 RX ADMIN — POTASSIUM CHLORIDE 20 MEQ: 1500 TABLET, EXTENDED RELEASE ORAL at 07:39

## 2020-01-15 RX ADMIN — FERROUS SULFATE TAB 325 MG (65 MG ELEMENTAL FE) 325 MG: 325 (65 FE) TAB at 05:25

## 2020-01-15 RX ADMIN — LISINOPRIL 5 MG: 5 TABLET ORAL at 05:25

## 2020-01-15 RX ADMIN — CINACALCET HYDROCHLORIDE 30 MG: 30 TABLET, FILM COATED ORAL at 05:25

## 2020-01-15 RX ADMIN — ALBUTEROL SULFATE 2.5 MG: 2.5 SOLUTION RESPIRATORY (INHALATION) at 08:04

## 2020-01-15 RX ADMIN — SPIRONOLACTONE 25 MG: 25 TABLET ORAL at 05:26

## 2020-01-15 RX ADMIN — CARVEDILOL 3.12 MG: 3.12 TABLET, FILM COATED ORAL at 07:39

## 2020-01-15 RX ADMIN — ENOXAPARIN SODIUM 40 MG: 100 INJECTION SUBCUTANEOUS at 05:25

## 2020-01-15 ASSESSMENT — COGNITIVE AND FUNCTIONAL STATUS - GENERAL
MOBILITY SCORE: 24
SUGGESTED CMS G CODE MODIFIER MOBILITY: CH
DAILY ACTIVITIY SCORE: 24
SUGGESTED CMS G CODE MODIFIER DAILY ACTIVITY: CH

## 2020-01-15 ASSESSMENT — ENCOUNTER SYMPTOMS
WHEEZING: 0
BACK PAIN: 0
COUGH: 1
PND: 1
SPUTUM PRODUCTION: 0
SORE THROAT: 0
SHORTNESS OF BREATH: 1
ORTHOPNEA: 1
VOMITING: 0
HEADACHES: 0
FEVER: 0
HEMOPTYSIS: 0
SENSORY CHANGE: 0
DIAPHORESIS: 0
NERVOUS/ANXIOUS: 0
PALPITATIONS: 0
ABDOMINAL PAIN: 0
BLURRED VISION: 0
FOCAL WEAKNESS: 0
SPEECH CHANGE: 0
NAUSEA: 0
BRUISES/BLEEDS EASILY: 0
DIARRHEA: 0

## 2020-01-15 ASSESSMENT — LIFESTYLE VARIABLES
DOES PATIENT WANT TO STOP DRINKING: CANNOT ASSESS
CONSUMPTION TOTAL: NEGATIVE
HAVE PEOPLE ANNOYED YOU BY CRITICIZING YOUR DRINKING: NO
ON A TYPICAL DAY WHEN YOU DRINK ALCOHOL HOW MANY DRINKS DO YOU HAVE: 0
EVER FELT BAD OR GUILTY ABOUT YOUR DRINKING: NO
AVERAGE NUMBER OF DAYS PER WEEK YOU HAVE A DRINK CONTAINING ALCOHOL: 0
TOTAL SCORE: 0
ALCOHOL_USE: NO
TOTAL SCORE: 0
HAVE YOU EVER FELT YOU SHOULD CUT DOWN ON YOUR DRINKING: NO
HOW MANY TIMES IN THE PAST YEAR HAVE YOU HAD 5 OR MORE DRINKS IN A DAY: 0
EVER_SMOKED: YES
TOTAL SCORE: 0
EVER HAD A DRINK FIRST THING IN THE MORNING TO STEADY YOUR NERVES TO GET RID OF A HANGOVER: NO

## 2020-01-15 ASSESSMENT — ACTIVITIES OF DAILY LIVING (ADL): TOILETING: INDEPENDENT

## 2020-01-15 ASSESSMENT — PATIENT HEALTH QUESTIONNAIRE - PHQ9
2. FEELING DOWN, DEPRESSED, IRRITABLE, OR HOPELESS: SEVERAL DAYS
3. TROUBLE FALLING OR STAYING ASLEEP OR SLEEPING TOO MUCH: MORE THAN HALF THE DAYS
5. POOR APPETITE OR OVEREATING: NOT AT ALL
SUM OF ALL RESPONSES TO PHQ9 QUESTIONS 1 AND 2: 1
8. MOVING OR SPEAKING SO SLOWLY THAT OTHER PEOPLE COULD HAVE NOTICED. OR THE OPPOSITE, BEING SO FIGETY OR RESTLESS THAT YOU HAVE BEEN MOVING AROUND A LOT MORE THAN USUAL: NOT AT ALL
1. LITTLE INTEREST OR PLEASURE IN DOING THINGS: NOT AT ALL
4. FEELING TIRED OR HAVING LITTLE ENERGY: SEVERAL DAYS
7. TROUBLE CONCENTRATING ON THINGS, SUCH AS READING THE NEWSPAPER OR WATCHING TELEVISION: MORE THAN HALF THE DAYS
6. FEELING BAD ABOUT YOURSELF - OR THAT YOU ARE A FAILURE OR HAVE LET YOURSELF OR YOUR FAMILY DOWN: NOT AL ALL
SUM OF ALL RESPONSES TO PHQ QUESTIONS 1-9: 6
9. THOUGHTS THAT YOU WOULD BE BETTER OFF DEAD, OR OF HURTING YOURSELF: NOT AT ALL

## 2020-01-15 ASSESSMENT — GAIT ASSESSMENTS
GAIT LEVEL OF ASSIST: SUPERVISED
DEVIATION: DECREASED BASE OF SUPPORT
DISTANCE (FEET): 300

## 2020-01-15 NOTE — DISCHARGE INSTRUCTIONS
Discharge Instructions    Discharged to home by car with relative. Discharged via wheelchair, hospital escort: Yes.  Special equipment needed: Not Applicable    Be sure to schedule a follow-up appointment with your primary care doctor or any specialists as instructed.     Discharge Plan:   Smoking Cessation Offered: Patient Counseled    I understand that a diet low in cholesterol, fat, and sodium is recommended for good health. Unless I have been given specific instructions below for another diet, I accept this instruction as my diet prescription.   Other diet: Heart Healthy    Special Instructions:   HF Patient Discharge Instructions  · Monitor your weight daily, and maintain a weight chart, to track your weight changes.   · Activity as tolerated, unless your Doctor has ordered otherwise. Other activity order: As tolerated.  · Follow a low fat, low cholesterol, low salt diet unless instructed otherwise by your Doctor. Read the labels on the back of food products and track your intake of fat, cholesterol and salt.   · Fluid Restriction Yes. If a Fluid Restriction has been ordered by your Doctor, measure fluids with a measuring cup to ensure that you are not exceeding the restriction.   · No smoking.  · Oxygen Yes. If your Doctor has ordered that you wear Oxygen at home, it is important to wear it as ordered.  · Did you receive an explanation from staff on the importance of taking each of your medications and why it is necessary to keep taking them unless your doctor says to stop? Yes  · Were all of your questions answered about how to manage your heart failure and what to do if you have increased signs and symptoms after you go home? Yes  · Do you feel like your heart failure care team involved you in the care treatment plan and allowed you to make decisions regarding your care while in the hospital and addressed any discharge needs you might have? Yes    See the educational handout provided at discharge for more  information on monitoring your daily weight, activity and diet. This also explains more about Heart Failure, symptoms of a flare-up and some of the tests that you have undergone.     Warning Signs of a Flare-Up include:  · Swelling in the ankles or lower legs.  · Shortness of breath, while at rest, or while doing normal activities.   · Shortness of breath at night when in bed, or coughing in bed.   · Requiring more pillows to sleep at night, or needing to sit up at night to sleep.  · Feeling weak, dizzy or fatigued.     When to call your Doctor:  · Call [facility] seven days a week from 8:00 a.m. to 8:00 p.m. for medical questions [phone].  · Call your Primary Care Physician or Cardiologist if:   1. You experience any pain radiating to your jaw or neck.  2. You have any difficulty breathing.  3. You experience weight gain of 3 lbs in a day or 5 lbs in a week.   4. You feel any palpitations or irregular heartbeats.  5. You become dizzy or lose consciousness.   If you have had an angiogram or had a pacemaker or AICD placed, and experience:  1. Bleeding, drainage or swelling at the surgical / puncture site.  2. Fever greater than 100.0 F  3. Shock from internal defibrillator.  4. Cool and / or numb extremities.      · Is patient discharged on Warfarin / Coumadin?   No     Depression / Suicide Risk    As you are discharged from this [facility], it is important to learn how to keep safe from harming yourself.    Recognize the warning signs:  · Abrupt changes in personality, positive or negative- including increase in energy   · Giving away possessions  · Change in eating patterns- significant weight changes-  positive or negative  · Change in sleeping patterns- unable to sleep or sleeping all the time   · Unwillingness or inability to communicate  · Depression  · Unusual sadness, discouragement and loneliness  · Talk of wanting to die  · Neglect of personal appearance   · Rebelliousness-  reckless behavior  · Withdrawal from people/activities they love  · Confusion- inability to concentrate     If you or a loved one observes any of these behaviors or has concerns about self-harm, here's what you can do:  · Talk about it- your feelings and reasons for harming yourself  · Remove any means that you might use to hurt yourself (examples: pills, rope, extension cords, firearm)  · Get professional help from the community (Mental Health, Substance Abuse, psychological counseling)  · Do not be alone:Call your Safe Contact- someone whom you trust who will be there for you.  · Call your local CRISIS HOTLINE 418-6384 or 646-853-4331  · Call your local Children's Mobile Crisis Response Team [place] (153) 671-6103 or www.Spotted  · Call the toll free National Suicide Prevention Hotlines   · National Suicide Prevention Lifeline 599-789-ZYZR (4317)  · [employer] Line Network 800-SUICIDE (828-0250)      Heart Failure  Heart failure is a condition in which the heart has trouble pumping blood because it has become weak or stiff. This means that the heart does not pump blood efficiently for the body to work well. For some people with heart failure, fluid may back up into the lungs and there may be swelling (edema) in the lower legs. Heart failure is usually a long-term (chronic) condition. It is important for you to take good care of yourself and follow the treatment plan from your health care provider.  What are the causes?  This condition is caused by some health problems, including:  · High blood pressure (hypertension). Hypertension causes the heart muscle to work harder than normal. High blood pressure eventually causes the heart to become stiff and weak.  · Coronary artery disease (CAD). CAD is the buildup of cholesterol and fat (plaques) in the arteries of the heart.  · Heart attack (myocardial infarction). Injured tissue, which is caused by the heart attack, does not contract as well and the  heart's ability to pump blood is weakened.  · Abnormal heart valves. When the heart valves do not open and close properly, the heart muscle must pump harder to keep the blood flowing.  · Heart muscle disease (cardiomyopathy or myocarditis). Heart muscle disease is damage to the heart muscle from a variety of causes, such as drug or alcohol abuse, infections, or unknown causes. These can increase the risk of heart failure.  · Lung disease. When the lungs do not work properly, the heart must work harder.  What increases the risk?  Risk of heart failure increases as a person ages. This condition is also more likely to develop in people who:  · Are overweight.  · Are male.  · Smoke or chew tobacco.  · Abuse alcohol or illegal drugs.  · Have taken medicines that can damage the heart, such as chemotherapy drugs.  · Have diabetes.  ¨ High blood sugar (glucose) is associated with high fat (lipid) levels in the blood.  ¨ Diabetes can also damage tiny blood vessels that carry nutrients to the heart muscle.  · Have abnormal heart rhythms.  · Have thyroid problems.  · Have low blood counts (anemia).  What are the signs or symptoms?  Symptoms of this condition include:  · Shortness of breath with activity, such as when climbing stairs.  · Persistent cough.  · Swelling of the feet, ankles, legs, or abdomen.  · Unexplained weight gain.  · Difficulty breathing when lying flat (orthopnea).  · Waking from sleep because of the need to sit up and get more air.  · Rapid heartbeat.  · Fatigue and loss of energy.  · Feeling light-headed, dizzy, or close to fainting.  · Loss of appetite.  · Nausea.  · Increased urination during the night (nocturia).  · Confusion.  How is this diagnosed?  This condition is diagnosed based on:  · Medical history, symptoms, and a physical exam.  · Diagnostic tests, which may include:  ¨ Echocardiogram.  ¨ Electrocardiogram (ECG).  ¨ Chest X-ray.  ¨ Blood tests.  ¨ Exercise stress test.  ¨ Radionuclide  scans.  ¨ Cardiac catheterization and angiogram.  How is this treated?  Treatment for this condition is aimed at managing the symptoms of heart failure. Medicines, behavioral changes, or other treatments may be necessary to treat heart failure.  Medicines   These may include:  · Angiotensin-converting enzyme (ACE) inhibitors. This type of medicine blocks the effects of a blood protein called angiotensin-converting enzyme. ACE inhibitors relax (dilate) the blood vessels and help to lower blood pressure.  · Angiotensin receptor blockers (ARBs). This type of medicine blocks the actions of a blood protein called angiotensin. ARBs dilate the blood vessels and help to lower blood pressure.  · Water pills (diuretics). Diuretics cause the kidneys to remove salt and water from the blood. The extra fluid is removed through urination, leaving a lower volume of blood that the heart has to pump.  · Beta blockers. These improve heart muscle strength and they prevent the heart from beating too quickly.  · Digoxin. This increases the force of the heartbeat.  Healthy behavior changes   These may include:  · Reaching and maintaining a healthy weight.  · Stopping smoking or chewing tobacco.  · Eating heart-healthy foods.  · Limiting or avoiding alcohol.  · Stopping use of street drugs (illegal drugs).  · Physical activity.  Other treatments   These may include:  · Surgery to open blocked coronary arteries or repair damaged heart valves.  · Placement of a biventricular pacemaker to improve heart muscle function (cardiac resynchronization therapy). This device paces both the right ventricle and left ventricle.  · Placement of a device to treat serious abnormal heart rhythms (implantable cardioverter defibrillator, or ICD).  · Placement of a device to improve the pumping ability of the heart (left ventricular assist device, or LVAD).  · Heart transplant. This can cure heart failure, and it is considered for certain patients who do not  improve with other therapies.  Follow these instructions at home:  Medicines  · Take over-the-counter and prescription medicines only as told by your health care provider. Medicines are important in reducing the workload of your heart, slowing the progression of heart failure, and improving your symptoms.  ¨ Do not stop taking your medicine unless your health care provider told you to do that.  ¨ Do not skip any dose of medicine.  ¨ Refill your prescriptions before you run out of medicine. You need your medicines every day.  Eating and drinking  · Eat heart-healthy foods. Talk with a dietitian to make an eating plan that is right for you.  ¨ Choose foods that contain no trans fat and are low in saturated fat and cholesterol. Healthy choices include fresh or frozen fruits and vegetables, fish, lean meats, legumes, fat-free or low-fat dairy products, and whole-grain or high-fiber foods.  ¨ Limit salt (sodium) if directed by your health care provider. Sodium restriction may reduce symptoms of heart failure. Ask a dietitian to recommend heart-healthy seasonings.  ¨ Use healthy cooking methods instead of frying. Healthy methods include roasting, grilling, broiling, baking, poaching, steaming, and stir-frying.  · Limit your fluid intake if directed by your health care provider. Fluid restriction may reduce symptoms of heart failure.  Lifestyle  · Stop smoking or using chewing tobacco. Nicotine and tobacco can damage your heart and your blood vessels. Do not use nicotine gum or patches before talking to your health care provider.  · Limit alcohol intake to no more than 1 drink per day for non-pregnant women and 2 drinks per day for men. One drink equals 12 oz of beer, 5 oz of wine, or 1½ oz of hard liquor.  ¨ Drinking more than that is harmful to your heart. Tell your health care provider if you drink alcohol several times a week.  ¨ Talk with your health care provider about whether any level of alcohol use is safe for  you.  ¨ If your heart has already been damaged by alcohol or you have severe heart failure, drinking alcohol should be stopped completely.  · Stop use of illegal drugs.  · Lose weight if directed by your health care provider. Weight loss may reduce symptoms of heart failure.  · Do moderate physical activity if directed by your health care provider. People who are elderly and people with severe heart failure should consult with a health care provider for physical activity recommendations.  Monitor important information  · Weigh yourself every day. Keeping track of your weight daily helps you to notice excess fluid sooner.  ¨ Weigh yourself every morning after you urinate and before you eat breakfast.  ¨ Wear the same amount of clothing each time you weigh yourself.  ¨ Record your daily weight. Provide your health care provider with your weight record.  · Monitor and record your blood pressure as told by your health care provider.  · Check your pulse as told by your health care provider.  Dealing with extreme temperatures  · If the weather is extremely hot:  ¨ Avoid vigorous physical activity.  ¨ Use air conditioning or fans or seek a cooler location.  ¨ Avoid caffeine and alcohol.  ¨ Wear loose-fitting, lightweight, and light-colored clothing.  · If the weather is extremely cold:  ¨ Avoid vigorous physical activity.  ¨ Layer your clothes.  ¨ Wear mittens or gloves, a hat, and a scarf when you go outside.  ¨ Avoid alcohol.  General instructions  · Manage other health conditions such as hypertension, diabetes, thyroid disease, or abnormal heart rhythms as told by your health care provider.  · Learn to manage stress. If you need help to do this, ask your health care provider.  · Plan rest periods when fatigued.  · Get ongoing education and support as needed.  · Participate in or seek rehabilitation as needed to maintain or improve independence and quality of life.  · Stay up to date with immunizations. Keeping current  on pneumococcal and influenza immunizations is especially important to prevent respiratory infections.  · Keep all follow-up visits as told by your health care provider. This is important.  Contact a health care provider if:  · You have a rapid weight gain.  · You have increasing shortness of breath that is unusual for you.  · You are unable to participate in your usual physical activities.  · You tire easily.  · You cough more than normal, especially with physical activity.  · You have any swelling or more swelling in areas such as your hands, feet, ankles, or abdomen.  · You are unable to sleep because it is hard to breathe.  · You feel like your heart is beating quickly (palpitations).  · You become dizzy or light-headed when you stand up.  Get help right away if:  · You have difficulty breathing.  · You notice or your family notices a change in your awareness, such as having trouble staying awake or having difficulty with concentration.  · You have pain or discomfort in your chest.  · You have an episode of fainting (syncope).  This information is not intended to replace advice given to you by your health care provider. Make sure you discuss any questions you have with your health care provider.  Document Released: 12/18/2006 Document Revised: 08/22/2017 Document Reviewed: 07/12/2017  [employer] Interactive Patient Education © 2017 [employer]      Acute Respiratory Failure, Adult  Acute respiratory failure occurs when there is not enough oxygen passing from your lungs to your body. When this happens, your lungs have trouble removing carbon dioxide from the blood. This causes your blood oxygen level to drop too low as carbon dioxide builds up.  Acute respiratory failure is a medical emergency. It can develop quickly, but it is temporary if treated promptly. Your lung capacity, or how much air your lungs can hold, may improve with time, exercise, and treatment.  What are the causes?  There are many possible  causes of acute respiratory failure, including:  · Lung injury.  · Chest injury or damage to the ribs or tissues near the lungs.  · Lung conditions that affect the flow of air and blood into and out of the lungs, such as pneumonia, acute respiratory distress syndrome, and cystic fibrosis.  · Medical conditions, such as strokes or spinal cord injuries, that affect the muscles and nerves that control breathing.  · Blood infection (sepsis).  · Inflammation of the pancreas (pancreatitis).  · A blood clot in the lungs (pulmonary embolism).  · A large-volume blood transfusion.  · Burns.  · Near-drowning.  · Seizure.  · Smoke inhalation.  · Reaction to medicines.  · Alcohol or drug overdose.  What increases the risk?  This condition is more likely to develop in people who have:  · A blocked airway.  · Asthma.  · A condition or disease that damages or weakens the muscles, nerves, bones, or tissues that are involved in breathing.  · A serious infection.  · A health problem that blocks the unconscious reflex that is involved in breathing, such as hypothyroidism or sleep apnea.  · A lung injury or trauma.  What are the signs or symptoms?  Trouble breathing is the main symptom of acute respiratory failure. Symptoms may also include:  · Rapid breathing.  · Restlessness or anxiety.  · Skin, lips, or fingernails that appear blue (cyanosis).  · Rapid heart rate.  · Abnormal heart rhythms (arrhythmias).  · Confusion or changes in behavior.  · Tiredness or loss of energy.  · Feeling sleepy or having a loss of consciousness.  How is this diagnosed?  Your health care provider can diagnose acute respiratory failure with a medical history and physical exam. During the exam, your health care provider will listen to your heart and check for crackling or wheezing sounds in your lungs. Your may also have tests to confirm the diagnosis and determine what is causing respiratory failure. These tests may include:  · Measuring the amount of  oxygen in your blood (pulse oximetry). The measurement comes from a small device that is placed on your finger, earlobe, or toe.  · Other blood tests to measure blood gases and to look for signs of infection.  · Sampling your cerebral spinal fluid or tracheal fluid to check for infections.  · Chest X-ray to look for fluid in spaces that should be filled with air.  · Electrocardiogram (ECG) to look at the heart's electrical activity.  How is this treated?  Treatment for this condition usually takes places in a hospital intensive care unit (ICU). Treatment depends on what is causing the condition. It may include one or more treatments until your symptoms improve. Treatment may include:  · Supplemental oxygen. Extra oxygen is given through a tube in the nose, a face mask, or a umana.  · A device such as a continuous positive airway pressure (CPAP) or bi-level positive airway pressure (BiPAP or BPAP) machine. This treatment uses mild air pressure to keep the airways open. A mask or other device will be placed over your nose or mouth. A tube that is connected to a motor will deliver oxygen through the mask.  · Ventilator. This treatment helps move air into and out of the lungs. This may be done with a bag and mask or a machine. For this treatment, a tube is placed in your windpipe (trachea) so air and oxygen can flow to the lungs.  · Extracorporeal membrane oxygenation (ECMO). This treatment temporarily takes over the function of the heart and lungs, supplying oxygen and removing carbon dioxide. ECMO gives the lungs a chance to recover. It may be used if a ventilator is not effective.  · Tracheostomy. This is a procedure that creates a hole in the neck to insert a breathing tube.  · Receiving fluids and medicines.  · Rocking the bed to help breathing.  Follow these instructions at home:  · Take over-the-counter and prescription medicines only as told by your health care provider.  · Return to normal activities as told by  your health care provider. Ask your health care provider what activities are safe for you.  · Keep all follow-up visits as told by your health care provider. This is important.  How is this prevented?  Treating infections and medical conditions that may lead to acute respiratory failure can help prevent the condition from developing.  Contact a health care provider if:  · You have a fever.  · Your symptoms do not improve or they get worse.  Get help right away if:  · You are having trouble breathing.  · You lose consciousness.  · Your have cyanosis or turn blue.  · You develop a rapid heart rate.  · You are confused.  These symptoms may represent a serious problem that is an emergency. Do not wait to see if the symptoms will go away. Get medical help right away. Call your local emergency services (911 in the [place] Do not drive yourself to the hospital.   This information is not intended to replace advice given to you by your health care provider. Make sure you discuss any questions you have with your health care provider.  Document Released: 12/23/2014 Document Revised: 07/15/2017 Document Reviewed: 07/05/2017  [employer] Interactive Patient Education © 2017 [employer]

## 2020-01-15 NOTE — THERAPY
"Physical Therapy Evaluation completed.   Bed Mobility:  Supine to Sit: Modified Independent(use of railing; raised HOB; sleeps in recliner)  Transfers: Sit to Stand: Supervised  Gait: Level Of Assist: Supervised with No Equipment Needed       Plan of Care: Will benefit from Physical Therapy 2 times per week  Discharge Recommendations: Equipment: No Equipment Needed unless ADL equipment needed per OT     Pt presents with impaired activity tolerance and dynamic balance associated with c/c of respiratory failure requiring intubation, found to have PNA and septic shock. Pt most limited by limited mobility while in hospital, some higher level balance deficits noted but in setting of negative fall risk, anticipate these to improve with continued mobilization with RN staff and does not require PT intervention at this time. From a PT perspective, pt appears functionally capable of dc home when medically appropriate to do so, no further acute PT needs identified, please reconsult should condition change, pt would benefit from home health however she will be relocating to [facility] after dc.     See \"Rehab Therapy-Acute\" Patient Summary Report for complete documentation.     "

## 2020-01-15 NOTE — PROGRESS NOTES
Patient discharged home with relative. Discharged via wheelchair to [facility]. Patient was A&Ox4. All lines removed. Discharge plan discussed. Patient educated when to call MD for worsening symptoms. Patient also educated on when to call 911. Patient belongings discharged with patient. Tele box removed. Monitor room notified.

## 2020-01-15 NOTE — THERAPY
"Occupational Therapy Evaluation completed.   Functional Status: Up in chair, w/spv w/sit>stand walking in room no AD, spv w/grooming standing at sink slightly SOB but recovered w/rest break, spv w/LB dressing. No overt c/o pain or fatigue, looking forward to d/c. Remained up chair w/call light and tray table   Plan of Care: Patient with no further skilled OT needs in the acute care setting at this time  Discharge Recommendations:  Equipment: No Equipment Needed. Post-acute therapy Anticipate that the patient will have no further occupational therapy needs after discharge from the hospital.       See \"Rehab Therapy-Acute\" Patient Summary Report for complete documentation.      74 yr old female admitted for SOB, PMHx: CHF, COPD, CAD, DM, HTN, and MI. This admission pt is dx w/acute respiratory failure w/hypoxia, septic shock, dyspnea, and CAP. Pt appears to be at/near her functional baseline pt has some on going SOB w/activity, but is able to apply compensatory strategies and plans to travel to [place]. At this time anticipate no further acute OT needs   "

## 2020-01-15 NOTE — CARE PLAN
Problem: Safety  Goal: Will remain free from falls  1/15/2020 0449 by BRANDON HeadN.  Outcome: PROGRESSING AS EXPECTED  1/15/2020 0025 by Daniel Garrison R.N.  Outcome: PROGRESSING AS EXPECTED     Problem: Knowledge Deficit  Goal: Knowledge of disease process/condition, treatment plan, diagnostic tests, and medications will improve  Outcome: PROGRESSING AS EXPECTED     Problem: Respiratory:  Goal: Respiratory status will improve  Outcome: PROGRESSING AS EXPECTED     Problem: Pain Management  Goal: Pain level will decrease to patient's comfort goal  Outcome: PROGRESSING AS EXPECTED

## 2020-01-15 NOTE — PROGRESS NOTES
Handoff report received from day shift nurse. Pt care assumed. Pt is currently resting in bed. POC discussed with Pt and Pt verbalizes no questions at this time. Pt is AAOx4, on 3L NC, on Tele monitoring, and VSS. Call light and belongings within reach, bed in lowest and locked position, and Pt educated on use of call light. Will continue to monitor.

## 2020-01-15 NOTE — PROGRESS NOTES
Critical Care Progress Note    Date of admission  1/10/2020    Chief Complaint  74 y.o. female who presented 1/10/2020 with respiratory failure and encephalopathy.  I attempted to call the patient's sister for history however the person who answered at that phone number did not know the patient.  Apparently she has a  but we do not have contact information for him.       Per ERP she had an hour onset of dyspnea so EMS was called.  In the ER she was in respiratory distress and encephalopathic so was intubated.  No additional history available.      POCUS shows normal biventricular function, small collapsing IVC, no significant valvular regurgitation, a couple scattered B-lines but not significant pulmonary edema            Interval Problem Update  Chart review from the past 24 hours includes imaging, laboratory studies, vital signs and notes available.  Pertinent data for today's visit includes tele transfer, afebrile, eager for home      Review of Systems  Review of Systems   Constitutional: Positive for malaise/fatigue (Resolving). Negative for diaphoresis and fever.   HENT: Negative for congestion and sore throat.    Eyes: Negative for blurred vision.   Respiratory: Positive for cough (reolving) and shortness of breath (Improved). Negative for hemoptysis, sputum production and wheezing.    Cardiovascular: Positive for orthopnea (Improved), leg swelling and PND (Improved). Negative for chest pain and palpitations.        CHF S/s better   Gastrointestinal: Negative for abdominal pain, diarrhea, nausea and vomiting.   Genitourinary: Negative.    Musculoskeletal: Negative for back pain.   Neurological: Negative for sensory change, speech change, focal weakness and headaches.   Endo/Heme/Allergies: Does not bruise/bleed easily.   Psychiatric/Behavioral: The patient is not nervous/anxious.         Vital Signs for last 24 hours   Temp:  [36.2 °C (97.2 °F)-37.1 °C (98.7 °F)] 36.6 °C (97.9 °F)  Pulse:  [62-89]  84  Resp:  [16-20] 18  BP: (120-152)/(62-88) 152/79  SpO2:  [93 %-98 %] 94 %       Physical Exam  Vitals signs and nursing note reviewed.   Constitutional:       Appearance: She is ill-appearing (More chronic than acute). She is not toxic-appearing or diaphoretic.   HENT:      Head: Normocephalic and atraumatic.      Right Ear: External ear normal.      Left Ear: External ear normal.      Nose: No congestion or rhinorrhea.      Mouth/Throat:      Mouth: Mucous membranes are dry.      Pharynx: No oropharyngeal exudate or posterior oropharyngeal erythema.   Eyes:      General: No scleral icterus.     Extraocular Movements: Extraocular movements intact.      Conjunctiva/sclera: Conjunctivae normal.      Pupils: Pupils are equal, round, and reactive to light.   Neck:      Musculoskeletal: Neck supple. No neck rigidity or muscular tenderness.   Cardiovascular:      Rate and Rhythm: Normal rate and regular rhythm.      Pulses: Normal pulses.      Heart sounds: Normal heart sounds. No murmur.      Comments: SR  Pulmonary:      Effort: No respiratory distress.      Breath sounds: No wheezing, rhonchi or rales.   Abdominal:      General: There is no distension.      Palpations: There is no mass.      Tenderness: There is no tenderness. There is no guarding.   Musculoskeletal:         General: No swelling or tenderness.      Right lower leg: No edema.      Left lower leg: No edema.   Lymphadenopathy:      Cervical: No cervical adenopathy.   Skin:     Coloration: Skin is not jaundiced or pale.      Findings: No bruising, erythema, lesion or rash.   Neurological:      General: No focal deficit present.      Mental Status: She is alert.      Cranial Nerves: No cranial nerve deficit.      Sensory: No sensory deficit.      Motor: No weakness.      Coordination: Coordination normal.      Deep Tendon Reflexes: Reflexes normal.   Psychiatric:         Attention and Perception: Attention normal.         Mood and Affect: Mood normal.          Speech: Speech normal.         Behavior: Behavior normal.         Cognition and Memory: Cognition and memory normal.         Medications  Current Facility-Administered Medications   Medication Dose Route Frequency Provider Last Rate Last Dose   • ferrous sulfate tablet 325 mg  325 mg Oral Q48HRS Sharad Castillo M.D.   325 mg at 01/15/20 0525   • umeclidinium-vilanterol (ANORO ELLIPTA) inhaler 1 Puff  1 Puff Inhalation QDAILY (RT) Sharad Castillo M.D.   Stopped at 01/14/20 1045    And   • fluticasone (FLOVENT HFA) 44 MCG/ACT inhaler 88 mcg  2 Puff Inhalation QDAILY (RT) Sharad Castillo M.D.   Stopped at 01/14/20 1045   • ipratropium-albuterol (DUONEB) nebulizer solution  3 mL Nebulization Q4H PRN (RT) Sharad Castillo M.D.   3 mL at 01/14/20 2217   • albuterol (PROVENTIL) 2.5mg/0.5ml nebulizer solution 2.5 mg  2.5 mg Nebulization TID Sharad Castillo M.D.   2.5 mg at 01/15/20 0804   • atorvastatin (LIPITOR) tablet 40 mg  40 mg Oral Q EVENING Sharad Castillo M.D.   40 mg at 01/14/20 1726   • lisinopril (PRINIVIL) tablet 5 mg  5 mg Oral Q DAY Sharad Castillo M.D.   5 mg at 01/15/20 0525   • carvedilol (COREG) tablet 3.125 mg  3.125 mg Oral BID WITH MEALS Irtclaudia Cheema, D.O.   3.125 mg at 01/15/20 0739   • spironolactone (ALDACTONE) tablet 25 mg  25 mg Oral Q DAY Irtclaudia Cheema, D.O.   25 mg at 01/15/20 0526   • cinacalcet (SENSIPAR) tablet 30 mg  30 mg Oral DAILY Tariq Thakur M.D.   30 mg at 01/15/20 0525   • aspirin (ASA) chewable tab 81 mg  81 mg Oral DAILY Tariq Thakur M.D.   81 mg at 01/15/20 0525   • insulin lispro (HUMALOG) injection 1-6 Units  1-6 Units Subcutaneous 4X/DAY ACHS Tariq Thakur M.D.   Stopped at 01/15/20 0700    And   • DEXTROSE 10% BOLUS 250 mL  250 mL Intravenous Q15 MIN PRN Tariq Thakur M.D.       • senna-docusate (PERICOLACE or SENOKOT S) 8.6-50 MG per tablet 2 Tab  2 Tab Oral BID Tariq Thakur M.D.   2 Tab at 01/14/20 0538    And   • polyethylene glycol/lytes  (MIRALAX) PACKET 1 Packet  1 Packet Oral QDAY PRN Tariq Thakur M.D.        And   • magnesium hydroxide (MILK OF MAGNESIA) suspension 30 mL  30 mL Oral QDAY PRN Tariq Thakur M.D.        And   • bisacodyl (DULCOLAX) suppository 10 mg  10 mg Rectal QDAY PRN Tariq Thakur M.D.       • acetaminophen (TYLENOL) tablet 650 mg  650 mg Oral Q4HRS PRN Tarqi Thakur M.D.       • enoxaparin (LOVENOX) inj 40 mg  40 mg Subcutaneous DAILY Nancy Pickard M.D.   40 mg at 01/15/20 0525   • ondansetron (ZOFRAN) syringe/vial injection 4 mg  4 mg Intravenous Q4HRS PRN Rusty Solis M.D.   4 mg at 01/11/20 1715   • Respiratory Care per Protocol   Nebulization Continuous RT Sharad Castillo M.D.           Fluids  No intake or output data in the 24 hours ending 01/15/20 0844    Laboratory          Recent Labs     01/13/20  0445 01/14/20  0540 01/15/20  0330   SODIUM 147* 141 140   POTASSIUM 3.6 3.5* 3.5*   CHLORIDE 106 101 103   CO2 30 29 29   BUN 28* 31* 24*   CREATININE 0.86 0.88 0.75   MAGNESIUM 2.2  --   --    PHOSPHORUS 2.5  --   --    CALCIUM 10.4 10.1 9.7     Recent Labs     01/13/20 0445 01/13/20 0840 01/14/20 0540 01/15/20  0330   ALTSGPT 12  --   --   --    ASTSGOT 10*  --   --   --    ALKPHOSPHAT 76  --   --   --    TBILIRUBIN 0.5  --   --   --    PREALBUMIN  --  12.0*  --   --    GLUCOSE 113*  --  94 96     Recent Labs     01/13/20 0445 01/14/20  0540 01/15/20  0330   WBC 8.2 7.2 6.8   NEUTSPOLYS 72.00 63.20 61.30   LYMPHOCYTES 13.80* 21.60* 20.70*   MONOCYTES 10.90 8.50 9.50   EOSINOPHILS 2.20 5.30 7.00*   BASOPHILS 0.50 0.60 0.60   ASTSGOT 10*  --   --    ALTSGPT 12  --   --    ALKPHOSPHAT 76  --   --    TBILIRUBIN 0.5  --   --      Recent Labs     01/13/20  0445 01/13/20  0840 01/14/20  0540 01/15/20  0330   RBC 4.42  --  4.48 4.27   HEMOGLOBIN 11.5*  --  11.7* 11.1*   HEMATOCRIT 38.3  --  37.9 36.0*   PLATELETCT 287  --  280 291   IRON  --  28*  --   --    FERRITIN  --  70.1  --   --    TOTIRONBC   --  322  --   --        Imaging  X-Ray:  I have personally reviewed the images and compared with prior images.  EKG:  I have personally reviewed the images and compared with prior images.  CT:    Reviewed  Echo:   Reviewed    Assessment/Plan  * Acute respiratory failure with hypoxia (HCC)- (present on admission)  Assessment & Plan  Component of acute on chronic, patient on O2 3-4 L/min by nasal cannula at home  Secondary to septic shock from pna, improved  Suspect significant cardiac component with poor ef - 30%, down from 70% 2 years ago  Initial events came on fairly suddenly, chest x-ray looks more like pulmonary edema  Also suspect a component of COPD contributing  Intubated 1/11  Extubated 1/12  Rt/o2 protocols  Incentive spirometry  Mobilize      Community acquired pneumonia  Assessment & Plan    Treating CAP not resistant organisms  PCR Flu studies neg  Echo with EF 30%, down from 70% 2 years ago  X-ray better with diuresis  Suspect a component of COPD and pulmonary edema perhaps more likely than CAP, treating for all       Dyspnea  Assessment & Plan  History obtained by ERP was 1 hour of acute onet dyspnea.  This is concerning for PE or MI.  Low suspicion for PE given alternate etiologies and normal appearing RV on echo. MI certainly possible.  No STEMI, but + lateral and high lateral STD. Trend trops.  Probably related to her sepsis +/- PNA.  S. pneumo can come on quickly.  Echocardiogram shows EF down from 70% 2 years ago to 30% now.  Cardiology consultation pending, forced diuresis continues  Returning to baseline    Heart failure with reduced ejection fraction (HCC)  Assessment & Plan  Normal EF 2017, 70%  Initially normal EF on bedside TTE, then after fluids EF appeared about 30%  Formal TTE reveals EF 30% with moderate MR  Cardiology consultation noted, Cards to follow-up as an outpatient      COPD (chronic obstructive pulmonary disease) (HCC)  Assessment & Plan  Not wheezing on exam but significant  COPD   duoneb every 4 hours while awake and every 2 hours as needed  No indication for prednisone, monitoring  RT protocols  Mobilize  Patient is on home O2, 3-4 L.  She already has a home nebulizer    CAD (coronary artery disease)- (present on admission)  Assessment & Plan  No ASA, statin on home meds list  Cardiology evaluation, EF less than half year was 2 years ago  Conservative management per cardiology    Iron deficiency anemia  Assessment & Plan  Replete Iron  Serial CBC  Monitor for bleeding  Transfuse per protocols  Outpatient W/u as clinically appropriate    Encephalopathy acute  Assessment & Plan  Possibly sepsis related, improved  CTH reassuring  No neck stiffness concerning for meningitis  Following commands on propofol 1/11-12, back to neurologic baseline off the ventilator 1/12           I have performed a physical exam and reviewed and updated ROS and Plan today (1/15/2020). In review of yesterday's note (1/14/2020), there are no changes except as documented above.       Remarkably better, eager for home, has f/Nancy Wilder MD , FCCP, Pulmonary Service

## 2020-01-15 NOTE — PROGRESS NOTES
12 hr cc          Monitor Summary:  sr 63-98  With rare PVC, rare PAC, trigeminal, bigeminal, and couplet  .12/.08/.40

## 2020-01-15 NOTE — CARE PLAN
Problem: Nutritional:  Goal: Achieve adequate nutritional intake  Description  Patient will consume 50% or more of meals.   Outcome: PROGRESSING AS EXPECTED  2 meals documented at this time, both % consumed.   Cortrak was removed 1/13 and pt appears to be eating well, however will continue to monitor until more meals are documented.     RD following

## 2020-01-15 NOTE — PROGRESS NOTES
Received bedside report from RN, pt care assumed, VSS, pt assessment complete. Pt AAOx4, no c/o pain at this time. No signs of acute distress noted at this time. POC discussed with pt and verbalizes no questions. Pt denies any additional needs at this time. Bed in lowest position, pt educated on fall risk and verbalized understanding, call light within reach, hourly rounding initiated.

## 2020-01-15 NOTE — CARE PLAN
Problem: Safety  Goal: Will remain free from falls  Outcome: PROGRESSING AS EXPECTED   Patient's risk for injury and falls assessed. Appropriate safety precautions in place. Patient educated to utilize call light for needs. Patient verbalizes understanding.    Problem: Venous Thromboembolism (VTW)/Deep Vein Thrombosis (DVT) Prevention:  Goal: Patient will participate in Venous Thrombosis (VTE)/Deep Vein Thrombosis (DVT)Prevention Measures  Outcome: PROGRESSING AS EXPECTED   Patient educated on DVT risks. Patient VS are stable. No signs of DVT. Will continue to monitor for change status.

## 2021-01-15 DIAGNOSIS — Z23 NEED FOR VACCINATION: ICD-10-CM
